# Patient Record
Sex: MALE | Race: WHITE | NOT HISPANIC OR LATINO | URBAN - METROPOLITAN AREA
[De-identification: names, ages, dates, MRNs, and addresses within clinical notes are randomized per-mention and may not be internally consistent; named-entity substitution may affect disease eponyms.]

---

## 2019-03-14 ENCOUNTER — INPATIENT (INPATIENT)
Facility: HOSPITAL | Age: 84
LOS: 14 days | Discharge: HOME HEALTH | End: 2019-03-29
Attending: HOSPITALIST | Admitting: HOSPITALIST
Payer: MEDICAID

## 2019-03-14 VITALS
SYSTOLIC BLOOD PRESSURE: 96 MMHG | DIASTOLIC BLOOD PRESSURE: 25 MMHG | RESPIRATION RATE: 18 BRPM | HEART RATE: 102 BPM | OXYGEN SATURATION: 97 % | TEMPERATURE: 99 F

## 2019-03-14 DIAGNOSIS — J96.90 RESPIRATORY FAILURE, UNSPECIFIED, UNSPECIFIED WHETHER WITH HYPOXIA OR HYPERCAPNIA: ICD-10-CM

## 2019-03-14 DIAGNOSIS — R09.89 OTHER SPECIFIED SYMPTOMS AND SIGNS INVOLVING THE CIRCULATORY AND RESPIRATORY SYSTEMS: ICD-10-CM

## 2019-03-14 LAB
ALBUMIN SERPL ELPH-MCNC: 1.8 G/DL — LOW (ref 3.3–5)
ALBUMIN SERPL ELPH-MCNC: 3 G/DL — LOW (ref 3.3–5)
ALP SERPL-CCNC: 49 U/L — SIGNIFICANT CHANGE UP (ref 40–120)
ALP SERPL-CCNC: 83 U/L — SIGNIFICANT CHANGE UP (ref 40–120)
ALT FLD-CCNC: 18 U/L — SIGNIFICANT CHANGE UP (ref 4–41)
ALT FLD-CCNC: 9 U/L — SIGNIFICANT CHANGE UP (ref 4–41)
ANION GAP SERPL CALC-SCNC: 17 MMO/L — HIGH (ref 7–14)
ANION GAP SERPL CALC-SCNC: 18 MMO/L — HIGH (ref 7–14)
ANION GAP SERPL CALC-SCNC: 19 MMO/L — HIGH (ref 7–14)
ANION GAP SERPL CALC-SCNC: 9 MMO/L — SIGNIFICANT CHANGE UP (ref 7–14)
APAP SERPL-MCNC: < 15 UG/ML — LOW (ref 15–25)
APPEARANCE UR: CLEAR — SIGNIFICANT CHANGE UP
APTT BLD: 37.1 SEC — HIGH (ref 27.5–36.3)
AST SERPL-CCNC: 12 U/L — SIGNIFICANT CHANGE UP (ref 4–40)
AST SERPL-CCNC: 15 U/L — SIGNIFICANT CHANGE UP (ref 4–40)
B PERT DNA SPEC QL NAA+PROBE: NOT DETECTED — SIGNIFICANT CHANGE UP
B-OH-BUTYR SERPL-SCNC: 0.8 MMOL/L — HIGH (ref 0–0.4)
B-OH-BUTYR SERPL-SCNC: 1.4 MMOL/L — HIGH (ref 0–0.4)
B-OH-BUTYR SERPL-SCNC: < 0 MMOL/L — LOW (ref 0–0.4)
BACTERIA # UR AUTO: NEGATIVE — SIGNIFICANT CHANGE UP
BASE EXCESS BLDA CALC-SCNC: -10.3 MMOL/L — SIGNIFICANT CHANGE UP
BASE EXCESS BLDA CALC-SCNC: -10.3 MMOL/L — SIGNIFICANT CHANGE UP
BASE EXCESS BLDV CALC-SCNC: -10.2 MMOL/L — SIGNIFICANT CHANGE UP
BASE EXCESS BLDV CALC-SCNC: -7.2 MMOL/L — SIGNIFICANT CHANGE UP
BASE EXCESS BLDV CALC-SCNC: -7.5 MMOL/L — SIGNIFICANT CHANGE UP
BASE EXCESS BLDV CALC-SCNC: -7.8 MMOL/L — SIGNIFICANT CHANGE UP
BASOPHILS # BLD AUTO: 0.01 K/UL — SIGNIFICANT CHANGE UP (ref 0–0.2)
BASOPHILS # BLD AUTO: 0.02 K/UL — SIGNIFICANT CHANGE UP (ref 0–0.2)
BASOPHILS NFR BLD AUTO: 0.1 % — SIGNIFICANT CHANGE UP (ref 0–2)
BASOPHILS NFR BLD AUTO: 0.2 % — SIGNIFICANT CHANGE UP (ref 0–2)
BASOPHILS NFR SPEC: 0 % — SIGNIFICANT CHANGE UP (ref 0–2)
BILIRUB SERPL-MCNC: 0.4 MG/DL — SIGNIFICANT CHANGE UP (ref 0.2–1.2)
BILIRUB SERPL-MCNC: 0.9 MG/DL — SIGNIFICANT CHANGE UP (ref 0.2–1.2)
BILIRUB UR-MCNC: NEGATIVE — SIGNIFICANT CHANGE UP
BLASTS # FLD: 0 % — SIGNIFICANT CHANGE UP (ref 0–0)
BLD GP AB SCN SERPL QL: NEGATIVE — SIGNIFICANT CHANGE UP
BLOOD GAS VENOUS - CREATININE: 1.26 MG/DL — SIGNIFICANT CHANGE UP (ref 0.5–1.3)
BLOOD GAS VENOUS - CREATININE: 1.44 MG/DL — HIGH (ref 0.5–1.3)
BLOOD GAS VENOUS - CREATININE: 1.73 MG/DL — HIGH (ref 0.5–1.3)
BLOOD GAS VENOUS - CREATININE: 1.96 MG/DL — HIGH (ref 0.5–1.3)
BLOOD UR QL VISUAL: SIGNIFICANT CHANGE UP
BUN SERPL-MCNC: 13 MG/DL — SIGNIFICANT CHANGE UP (ref 7–23)
BUN SERPL-MCNC: 21 MG/DL — SIGNIFICANT CHANGE UP (ref 7–23)
BUN SERPL-MCNC: 22 MG/DL — SIGNIFICANT CHANGE UP (ref 7–23)
BUN SERPL-MCNC: 23 MG/DL — SIGNIFICANT CHANGE UP (ref 7–23)
C PNEUM DNA SPEC QL NAA+PROBE: NOT DETECTED — SIGNIFICANT CHANGE UP
CALCIUM SERPL-MCNC: 5.1 MG/DL — CRITICAL LOW (ref 8.4–10.5)
CALCIUM SERPL-MCNC: 5.9 MG/DL — CRITICAL LOW (ref 8.4–10.5)
CALCIUM SERPL-MCNC: 8.2 MG/DL — LOW (ref 8.4–10.5)
CALCIUM SERPL-MCNC: 8.8 MG/DL — SIGNIFICANT CHANGE UP (ref 8.4–10.5)
CHLORIDE BLDA-SCNC: 110 MMOL/L — HIGH (ref 96–108)
CHLORIDE BLDA-SCNC: 115 MMOL/L — HIGH (ref 96–108)
CHLORIDE BLDV-SCNC: 110 MMOL/L — HIGH (ref 96–108)
CHLORIDE BLDV-SCNC: 112 MMOL/L — HIGH (ref 96–108)
CHLORIDE BLDV-SCNC: 113 MMOL/L — HIGH (ref 96–108)
CHLORIDE BLDV-SCNC: 116 MMOL/L — HIGH (ref 96–108)
CHLORIDE SERPL-SCNC: 104 MMOL/L — SIGNIFICANT CHANGE UP (ref 98–107)
CHLORIDE SERPL-SCNC: 107 MMOL/L — SIGNIFICANT CHANGE UP (ref 98–107)
CHLORIDE SERPL-SCNC: 118 MMOL/L — HIGH (ref 98–107)
CHLORIDE SERPL-SCNC: 122 MMOL/L — HIGH (ref 98–107)
CK MB BLD-MCNC: < 1 NG/ML — LOW (ref 1–6.6)
CK MB BLD-MCNC: SIGNIFICANT CHANGE UP (ref 0–2.5)
CK SERPL-CCNC: 28 U/L — LOW (ref 30–200)
CO2 SERPL-SCNC: 11 MMOL/L — LOW (ref 22–31)
CO2 SERPL-SCNC: 12 MMOL/L — LOW (ref 22–31)
CO2 SERPL-SCNC: 13 MMOL/L — LOW (ref 22–31)
CO2 SERPL-SCNC: 15 MMOL/L — LOW (ref 22–31)
COLOR SPEC: SIGNIFICANT CHANGE UP
CREAT SERPL-MCNC: 0.89 MG/DL — SIGNIFICANT CHANGE UP (ref 0.5–1.3)
CREAT SERPL-MCNC: 1.54 MG/DL — HIGH (ref 0.5–1.3)
CREAT SERPL-MCNC: 1.58 MG/DL — HIGH (ref 0.5–1.3)
CREAT SERPL-MCNC: 1.8 MG/DL — HIGH (ref 0.5–1.3)
EOSINOPHIL # BLD AUTO: 0.01 K/UL — SIGNIFICANT CHANGE UP (ref 0–0.5)
EOSINOPHIL # BLD AUTO: 0.01 K/UL — SIGNIFICANT CHANGE UP (ref 0–0.5)
EOSINOPHIL NFR BLD AUTO: 0.1 % — SIGNIFICANT CHANGE UP (ref 0–6)
EOSINOPHIL NFR BLD AUTO: 0.1 % — SIGNIFICANT CHANGE UP (ref 0–6)
EOSINOPHIL NFR FLD: 0 % — SIGNIFICANT CHANGE UP (ref 0–6)
ETHANOL BLD-MCNC: < 10 MG/DL — SIGNIFICANT CHANGE UP
FLUAV H1 2009 PAND RNA SPEC QL NAA+PROBE: NOT DETECTED — SIGNIFICANT CHANGE UP
FLUAV H1 RNA SPEC QL NAA+PROBE: NOT DETECTED — SIGNIFICANT CHANGE UP
FLUAV H3 RNA SPEC QL NAA+PROBE: NOT DETECTED — SIGNIFICANT CHANGE UP
FLUAV SUBTYP SPEC NAA+PROBE: NOT DETECTED — SIGNIFICANT CHANGE UP
FLUBV RNA SPEC QL NAA+PROBE: NOT DETECTED — SIGNIFICANT CHANGE UP
GAS PNL BLDV: 129 MMOL/L — LOW (ref 136–146)
GAS PNL BLDV: 130 MMOL/L — LOW (ref 136–146)
GAS PNL BLDV: 132 MMOL/L — LOW (ref 136–146)
GAS PNL BLDV: 134 MMOL/L — LOW (ref 136–146)
GLUCOSE BLDA-MCNC: 403 MG/DL — HIGH (ref 70–99)
GLUCOSE BLDA-MCNC: 507 MG/DL — CRITICAL HIGH (ref 70–99)
GLUCOSE BLDV-MCNC: 222 — HIGH (ref 70–99)
GLUCOSE BLDV-MCNC: 407 — HIGH (ref 70–99)
GLUCOSE BLDV-MCNC: 470 — CRITICAL HIGH (ref 70–99)
GLUCOSE BLDV-MCNC: 525 — CRITICAL HIGH (ref 70–99)
GLUCOSE SERPL-MCNC: 162 MG/DL — HIGH (ref 70–99)
GLUCOSE SERPL-MCNC: 327 MG/DL — HIGH (ref 70–99)
GLUCOSE SERPL-MCNC: 417 MG/DL — HIGH (ref 70–99)
GLUCOSE SERPL-MCNC: 514 MG/DL — CRITICAL HIGH (ref 70–99)
GLUCOSE UR-MCNC: >1000 — HIGH
HADV DNA SPEC QL NAA+PROBE: NOT DETECTED — SIGNIFICANT CHANGE UP
HCO3 BLDA-SCNC: 17 MMOL/L — LOW (ref 22–26)
HCO3 BLDA-SCNC: 18 MMOL/L — LOW (ref 22–26)
HCO3 BLDV-SCNC: 14 MMOL/L — LOW (ref 20–27)
HCO3 BLDV-SCNC: 16 MMOL/L — LOW (ref 20–27)
HCO3 BLDV-SCNC: 16 MMOL/L — LOW (ref 20–27)
HCO3 BLDV-SCNC: 19 MMOL/L — LOW (ref 20–27)
HCOV PNL SPEC NAA+PROBE: SIGNIFICANT CHANGE UP
HCT VFR BLD CALC: 34.6 % — LOW (ref 39–50)
HCT VFR BLD CALC: 37.5 % — LOW (ref 39–50)
HCT VFR BLD CALC: 47.9 % — SIGNIFICANT CHANGE UP (ref 39–50)
HCT VFR BLDA CALC: 48.7 % — SIGNIFICANT CHANGE UP (ref 39–51)
HCT VFR BLDA CALC: 48.9 % — SIGNIFICANT CHANGE UP (ref 39–51)
HCT VFR BLDV CALC: 45.8 % — SIGNIFICANT CHANGE UP (ref 39–51)
HCT VFR BLDV CALC: 46.9 % — SIGNIFICANT CHANGE UP (ref 39–51)
HCT VFR BLDV CALC: 47.2 % — SIGNIFICANT CHANGE UP (ref 39–51)
HCT VFR BLDV CALC: 47.3 % — SIGNIFICANT CHANGE UP (ref 39–51)
HGB BLD-MCNC: 11 G/DL — LOW (ref 13–17)
HGB BLD-MCNC: 11.6 G/DL — LOW (ref 13–17)
HGB BLD-MCNC: 16 G/DL — SIGNIFICANT CHANGE UP (ref 13–17)
HGB BLDA-MCNC: 15.9 G/DL — SIGNIFICANT CHANGE UP (ref 13–17)
HGB BLDA-MCNC: 16 G/DL — SIGNIFICANT CHANGE UP (ref 13–17)
HGB BLDV-MCNC: 14.9 G/DL — SIGNIFICANT CHANGE UP (ref 13–17)
HGB BLDV-MCNC: 15.3 G/DL — SIGNIFICANT CHANGE UP (ref 13–17)
HGB BLDV-MCNC: 15.4 G/DL — SIGNIFICANT CHANGE UP (ref 13–17)
HGB BLDV-MCNC: 15.5 G/DL — SIGNIFICANT CHANGE UP (ref 13–17)
HMPV RNA SPEC QL NAA+PROBE: NOT DETECTED — SIGNIFICANT CHANGE UP
HPIV1 RNA SPEC QL NAA+PROBE: NOT DETECTED — SIGNIFICANT CHANGE UP
HPIV2 RNA SPEC QL NAA+PROBE: NOT DETECTED — SIGNIFICANT CHANGE UP
HPIV3 RNA SPEC QL NAA+PROBE: NOT DETECTED — SIGNIFICANT CHANGE UP
HPIV4 RNA SPEC QL NAA+PROBE: NOT DETECTED — SIGNIFICANT CHANGE UP
HYALINE CASTS # UR AUTO: SIGNIFICANT CHANGE UP
IMM GRANULOCYTES NFR BLD AUTO: 0.5 % — SIGNIFICANT CHANGE UP (ref 0–1.5)
IMM GRANULOCYTES NFR BLD AUTO: 0.5 % — SIGNIFICANT CHANGE UP (ref 0–1.5)
INR BLD: 1.38 — HIGH (ref 0.88–1.17)
KETONES UR-MCNC: NEGATIVE — SIGNIFICANT CHANGE UP
LACTATE BLDA-SCNC: 4.4 MMOL/L — CRITICAL HIGH (ref 0.5–2)
LACTATE BLDA-SCNC: 4.6 MMOL/L — CRITICAL HIGH (ref 0.5–2)
LACTATE BLDV-MCNC: 4.7 MMOL/L — CRITICAL HIGH (ref 0.5–2)
LACTATE BLDV-MCNC: 6.6 MMOL/L — CRITICAL HIGH (ref 0.5–2)
LACTATE BLDV-MCNC: 6.9 MMOL/L — CRITICAL HIGH (ref 0.5–2)
LACTATE BLDV-MCNC: 7.3 MMOL/L — CRITICAL HIGH (ref 0.5–2)
LEUKOCYTE ESTERASE UR-ACNC: NEGATIVE — SIGNIFICANT CHANGE UP
LIDOCAIN IGE QN: 9.3 U/L — SIGNIFICANT CHANGE UP (ref 7–60)
LYMPHOCYTES # BLD AUTO: 0.82 K/UL — LOW (ref 1–3.3)
LYMPHOCYTES # BLD AUTO: 1.18 K/UL — SIGNIFICANT CHANGE UP (ref 1–3.3)
LYMPHOCYTES # BLD AUTO: 15.2 % — SIGNIFICANT CHANGE UP (ref 13–44)
LYMPHOCYTES # BLD AUTO: 8.4 % — LOW (ref 13–44)
LYMPHOCYTES NFR SPEC AUTO: 8.5 % — LOW (ref 13–44)
MAGNESIUM SERPL-MCNC: 1.2 MG/DL — LOW (ref 1.6–2.6)
MAGNESIUM SERPL-MCNC: 1.3 MG/DL — LOW (ref 1.6–2.6)
MAGNESIUM SERPL-MCNC: 1.8 MG/DL — SIGNIFICANT CHANGE UP (ref 1.6–2.6)
MCHC RBC-ENTMCNC: 30.1 PG — SIGNIFICANT CHANGE UP (ref 27–34)
MCHC RBC-ENTMCNC: 30.4 PG — SIGNIFICANT CHANGE UP (ref 27–34)
MCHC RBC-ENTMCNC: 30.7 PG — SIGNIFICANT CHANGE UP (ref 27–34)
MCHC RBC-ENTMCNC: 30.9 % — LOW (ref 32–36)
MCHC RBC-ENTMCNC: 31.8 % — LOW (ref 32–36)
MCHC RBC-ENTMCNC: 33.4 % — SIGNIFICANT CHANGE UP (ref 32–36)
MCV RBC AUTO: 91.8 FL — SIGNIFICANT CHANGE UP (ref 80–100)
MCV RBC AUTO: 94.8 FL — SIGNIFICANT CHANGE UP (ref 80–100)
MCV RBC AUTO: 98.4 FL — SIGNIFICANT CHANGE UP (ref 80–100)
METAMYELOCYTES # FLD: 0 % — SIGNIFICANT CHANGE UP (ref 0–1)
MICROCYTES BLD QL: SLIGHT — SIGNIFICANT CHANGE UP
MONOCYTES # BLD AUTO: 0.45 K/UL — SIGNIFICANT CHANGE UP (ref 0–0.9)
MONOCYTES # BLD AUTO: 0.48 K/UL — SIGNIFICANT CHANGE UP (ref 0–0.9)
MONOCYTES NFR BLD AUTO: 4.9 % — SIGNIFICANT CHANGE UP (ref 2–14)
MONOCYTES NFR BLD AUTO: 5.8 % — SIGNIFICANT CHANGE UP (ref 2–14)
MONOCYTES NFR BLD: 0.9 % — LOW (ref 2–9)
MYELOCYTES NFR BLD: 0 % — SIGNIFICANT CHANGE UP (ref 0–0)
NEUTROPHIL AB SER-ACNC: 82.9 % — HIGH (ref 43–77)
NEUTROPHILS # BLD AUTO: 6.06 K/UL — SIGNIFICANT CHANGE UP (ref 1.8–7.4)
NEUTROPHILS # BLD AUTO: 8.42 K/UL — HIGH (ref 1.8–7.4)
NEUTROPHILS NFR BLD AUTO: 78.3 % — HIGH (ref 43–77)
NEUTROPHILS NFR BLD AUTO: 85.9 % — HIGH (ref 43–77)
NEUTS BAND # BLD: 7.7 % — HIGH (ref 0–6)
NITRITE UR-MCNC: NEGATIVE — SIGNIFICANT CHANGE UP
NRBC # FLD: 0 K/UL — LOW (ref 25–125)
NT-PROBNP SERPL-SCNC: 438.2 PG/ML — SIGNIFICANT CHANGE UP
OTHER - HEMATOLOGY %: 0 — SIGNIFICANT CHANGE UP
PCO2 BLDA: 22 MMHG — LOW (ref 35–48)
PCO2 BLDA: 27 MMHG — LOW (ref 35–48)
PCO2 BLDV: 29 MMHG — LOW (ref 41–51)
PCO2 BLDV: 44 MMHG — SIGNIFICANT CHANGE UP (ref 41–51)
PCO2 BLDV: 52 MMHG — HIGH (ref 41–51)
PCO2 BLDV: 55 MMHG — HIGH (ref 41–51)
PH BLDA: 7.34 PH — LOW (ref 7.35–7.45)
PH BLDA: 7.4 PH — SIGNIFICANT CHANGE UP (ref 7.35–7.45)
PH BLDV: 7.13 PH — CRITICAL LOW (ref 7.32–7.43)
PH BLDV: 7.2 PH — CRITICAL LOW (ref 7.32–7.43)
PH BLDV: 7.25 PH — LOW (ref 7.32–7.43)
PH BLDV: 7.38 PH — SIGNIFICANT CHANGE UP (ref 7.32–7.43)
PH UR: 6 — SIGNIFICANT CHANGE UP (ref 5–8)
PHOSPHATE SERPL-MCNC: 0.8 MG/DL — CRITICAL LOW (ref 2.5–4.5)
PHOSPHATE SERPL-MCNC: 1.4 MG/DL — LOW (ref 2.5–4.5)
PLATELET # BLD AUTO: 119 K/UL — LOW (ref 150–400)
PLATELET # BLD AUTO: 79 K/UL — LOW (ref 150–400)
PLATELET # BLD AUTO: 95 K/UL — LOW (ref 150–400)
PLATELET COUNT - ESTIMATE: SIGNIFICANT CHANGE UP
PMV BLD: 10.1 FL — SIGNIFICANT CHANGE UP (ref 7–13)
PMV BLD: 10.3 FL — SIGNIFICANT CHANGE UP (ref 7–13)
PMV BLD: 9.8 FL — SIGNIFICANT CHANGE UP (ref 7–13)
PO2 BLDA: 83 MMHG — SIGNIFICANT CHANGE UP (ref 83–108)
PO2 BLDA: 97 MMHG — SIGNIFICANT CHANGE UP (ref 83–108)
PO2 BLDV: 17 MMHG — LOW (ref 35–40)
PO2 BLDV: 18 MMHG — LOW (ref 35–40)
PO2 BLDV: 45 MMHG — HIGH (ref 35–40)
PO2 BLDV: < 24 MMHG — LOW (ref 35–40)
POIKILOCYTOSIS BLD QL AUTO: SLIGHT — SIGNIFICANT CHANGE UP
POTASSIUM BLDA-SCNC: 4.5 MMOL/L — SIGNIFICANT CHANGE UP (ref 3.4–4.5)
POTASSIUM BLDA-SCNC: 4.9 MMOL/L — HIGH (ref 3.4–4.5)
POTASSIUM BLDV-SCNC: 3 MMOL/L — LOW (ref 3.4–4.5)
POTASSIUM BLDV-SCNC: 4 MMOL/L — SIGNIFICANT CHANGE UP (ref 3.4–4.5)
POTASSIUM BLDV-SCNC: 5 MMOL/L — HIGH (ref 3.4–4.5)
POTASSIUM BLDV-SCNC: 5.4 MMOL/L — HIGH (ref 3.4–4.5)
POTASSIUM SERPL-MCNC: 2.2 MMOL/L — CRITICAL LOW (ref 3.5–5.3)
POTASSIUM SERPL-MCNC: 3.1 MMOL/L — LOW (ref 3.5–5.3)
POTASSIUM SERPL-MCNC: 5.2 MMOL/L — SIGNIFICANT CHANGE UP (ref 3.5–5.3)
POTASSIUM SERPL-MCNC: 5.7 MMOL/L — HIGH (ref 3.5–5.3)
POTASSIUM SERPL-SCNC: 2.2 MMOL/L — CRITICAL LOW (ref 3.5–5.3)
POTASSIUM SERPL-SCNC: 3.1 MMOL/L — LOW (ref 3.5–5.3)
POTASSIUM SERPL-SCNC: 5.2 MMOL/L — SIGNIFICANT CHANGE UP (ref 3.5–5.3)
POTASSIUM SERPL-SCNC: 5.7 MMOL/L — HIGH (ref 3.5–5.3)
PROCALCITONIN SERPL-MCNC: 12.22 NG/ML — HIGH (ref 0.02–0.1)
PROMYELOCYTES # FLD: 0 % — SIGNIFICANT CHANGE UP (ref 0–0)
PROT SERPL-MCNC: 3.6 G/DL — LOW (ref 6–8.3)
PROT SERPL-MCNC: 5.8 G/DL — LOW (ref 6–8.3)
PROT UR-MCNC: 100 — HIGH
PROTHROM AB SERPL-ACNC: 15.5 SEC — HIGH (ref 9.8–13.1)
RBC # BLD: 3.65 M/UL — LOW (ref 4.2–5.8)
RBC # BLD: 3.81 M/UL — LOW (ref 4.2–5.8)
RBC # BLD: 5.22 M/UL — SIGNIFICANT CHANGE UP (ref 4.2–5.8)
RBC # FLD: 12.9 % — SIGNIFICANT CHANGE UP (ref 10.3–14.5)
RBC # FLD: 13.1 % — SIGNIFICANT CHANGE UP (ref 10.3–14.5)
RBC # FLD: 13.3 % — SIGNIFICANT CHANGE UP (ref 10.3–14.5)
RBC CASTS # UR COMP ASSIST: SIGNIFICANT CHANGE UP (ref 0–?)
RH IG SCN BLD-IMP: NEGATIVE — SIGNIFICANT CHANGE UP
RSV RNA SPEC QL NAA+PROBE: NOT DETECTED — SIGNIFICANT CHANGE UP
RV+EV RNA SPEC QL NAA+PROBE: NOT DETECTED — SIGNIFICANT CHANGE UP
SALICYLATES SERPL-MCNC: < 5 MG/DL — LOW (ref 15–30)
SAO2 % BLDA: 95.7 % — SIGNIFICANT CHANGE UP (ref 95–99)
SAO2 % BLDA: 98 % — SIGNIFICANT CHANGE UP (ref 95–99)
SAO2 % BLDV: 17.3 % — LOW (ref 60–85)
SAO2 % BLDV: 21 % — LOW (ref 60–85)
SAO2 % BLDV: 23.2 % — LOW (ref 60–85)
SAO2 % BLDV: 81.4 % — SIGNIFICANT CHANGE UP (ref 60–85)
SMUDGE CELLS # BLD: PRESENT — SIGNIFICANT CHANGE UP
SODIUM BLDA-SCNC: 127 MMOL/L — LOW (ref 136–146)
SODIUM BLDA-SCNC: 133 MMOL/L — LOW (ref 136–146)
SODIUM SERPL-SCNC: 136 MMOL/L — SIGNIFICANT CHANGE UP (ref 135–145)
SODIUM SERPL-SCNC: 138 MMOL/L — SIGNIFICANT CHANGE UP (ref 135–145)
SODIUM SERPL-SCNC: 144 MMOL/L — SIGNIFICANT CHANGE UP (ref 135–145)
SODIUM SERPL-SCNC: 147 MMOL/L — HIGH (ref 135–145)
SP GR SPEC: 1.02 — SIGNIFICANT CHANGE UP (ref 1–1.04)
SPHEROCYTES BLD QL SMEAR: SIGNIFICANT CHANGE UP
SQUAMOUS # UR AUTO: SIGNIFICANT CHANGE UP
TROPONIN T, HIGH SENSITIVITY: 37 NG/L — SIGNIFICANT CHANGE UP (ref ?–14)
TROPONIN T, HIGH SENSITIVITY: 73 NG/L — CRITICAL HIGH (ref ?–14)
TROPONIN T, HIGH SENSITIVITY: 93 NG/L — CRITICAL HIGH (ref ?–14)
TSH SERPL-MCNC: 2.09 UIU/ML — SIGNIFICANT CHANGE UP (ref 0.27–4.2)
UROBILINOGEN FLD QL: NORMAL — SIGNIFICANT CHANGE UP
VARIANT LYMPHS # BLD: 0 % — SIGNIFICANT CHANGE UP
WBC # BLD: 13.34 K/UL — HIGH (ref 3.8–10.5)
WBC # BLD: 7.75 K/UL — SIGNIFICANT CHANGE UP (ref 3.8–10.5)
WBC # BLD: 9.8 K/UL — SIGNIFICANT CHANGE UP (ref 3.8–10.5)
WBC # FLD AUTO: 13.34 K/UL — HIGH (ref 3.8–10.5)
WBC # FLD AUTO: 7.75 K/UL — SIGNIFICANT CHANGE UP (ref 3.8–10.5)
WBC # FLD AUTO: 9.8 K/UL — SIGNIFICANT CHANGE UP (ref 3.8–10.5)
WBC UR QL: SIGNIFICANT CHANGE UP (ref 0–?)

## 2019-03-14 PROCEDURE — 70450 CT HEAD/BRAIN W/O DYE: CPT | Mod: 26

## 2019-03-14 PROCEDURE — 72125 CT NECK SPINE W/O DYE: CPT | Mod: 26

## 2019-03-14 PROCEDURE — 99223 1ST HOSP IP/OBS HIGH 75: CPT | Mod: GC

## 2019-03-14 PROCEDURE — 99232 SBSQ HOSP IP/OBS MODERATE 35: CPT | Mod: GC

## 2019-03-14 PROCEDURE — 71260 CT THORAX DX C+: CPT | Mod: 26

## 2019-03-14 PROCEDURE — 73552 X-RAY EXAM OF FEMUR 2/>: CPT | Mod: 26,LT

## 2019-03-14 PROCEDURE — 74177 CT ABD & PELVIS W/CONTRAST: CPT | Mod: 26

## 2019-03-14 PROCEDURE — 73502 X-RAY EXAM HIP UNI 2-3 VIEWS: CPT | Mod: 26,LT

## 2019-03-14 PROCEDURE — 71045 X-RAY EXAM CHEST 1 VIEW: CPT | Mod: 26

## 2019-03-14 PROCEDURE — 99497 ADVNCD CARE PLAN 30 MIN: CPT | Mod: 25

## 2019-03-14 RX ORDER — DEXTROSE 50 % IN WATER 50 %
25 SYRINGE (ML) INTRAVENOUS ONCE
Qty: 0 | Refills: 0 | Status: DISCONTINUED | OUTPATIENT
Start: 2019-03-14 | End: 2019-03-29

## 2019-03-14 RX ORDER — AZITHROMYCIN 500 MG/1
TABLET, FILM COATED ORAL
Qty: 0 | Refills: 0 | Status: DISCONTINUED | OUTPATIENT
Start: 2019-03-14 | End: 2019-03-16

## 2019-03-14 RX ORDER — IPRATROPIUM/ALBUTEROL SULFATE 18-103MCG
3 AEROSOL WITH ADAPTER (GRAM) INHALATION EVERY 6 HOURS
Qty: 0 | Refills: 0 | Status: DISCONTINUED | OUTPATIENT
Start: 2019-03-14 | End: 2019-03-14

## 2019-03-14 RX ORDER — POTASSIUM CHLORIDE 20 MEQ
10 PACKET (EA) ORAL ONCE
Qty: 0 | Refills: 0 | Status: COMPLETED | OUTPATIENT
Start: 2019-03-14 | End: 2019-03-14

## 2019-03-14 RX ORDER — AZITHROMYCIN 500 MG/1
500 TABLET, FILM COATED ORAL ONCE
Qty: 0 | Refills: 0 | Status: COMPLETED | OUTPATIENT
Start: 2019-03-14 | End: 2019-03-14

## 2019-03-14 RX ORDER — SODIUM CHLORIDE 9 MG/ML
1000 INJECTION INTRAMUSCULAR; INTRAVENOUS; SUBCUTANEOUS ONCE
Qty: 0 | Refills: 0 | Status: COMPLETED | OUTPATIENT
Start: 2019-03-14 | End: 2019-03-14

## 2019-03-14 RX ORDER — INSULIN LISPRO 100/ML
VIAL (ML) SUBCUTANEOUS EVERY 6 HOURS
Qty: 0 | Refills: 0 | Status: DISCONTINUED | OUTPATIENT
Start: 2019-03-14 | End: 2019-03-15

## 2019-03-14 RX ORDER — CALCIUM ACETATE 667 MG
667 TABLET ORAL
Qty: 0 | Refills: 0 | Status: COMPLETED | OUTPATIENT
Start: 2019-03-14 | End: 2019-03-15

## 2019-03-14 RX ORDER — AZITHROMYCIN 500 MG/1
500 TABLET, FILM COATED ORAL EVERY 24 HOURS
Qty: 0 | Refills: 0 | Status: DISCONTINUED | OUTPATIENT
Start: 2019-03-15 | End: 2019-03-16

## 2019-03-14 RX ORDER — INSULIN LISPRO 100/ML
5 VIAL (ML) SUBCUTANEOUS ONCE
Qty: 0 | Refills: 0 | Status: DISCONTINUED | OUTPATIENT
Start: 2019-03-14 | End: 2019-03-14

## 2019-03-14 RX ORDER — SODIUM,POTASSIUM PHOSPHATES 278-250MG
1 POWDER IN PACKET (EA) ORAL
Qty: 0 | Refills: 0 | Status: COMPLETED | OUTPATIENT
Start: 2019-03-14 | End: 2019-03-15

## 2019-03-14 RX ORDER — MIDAZOLAM HYDROCHLORIDE 1 MG/ML
1 INJECTION, SOLUTION INTRAMUSCULAR; INTRAVENOUS ONCE
Qty: 0 | Refills: 0 | Status: DISCONTINUED | OUTPATIENT
Start: 2019-03-14 | End: 2019-03-14

## 2019-03-14 RX ORDER — GLUCAGON INJECTION, SOLUTION 0.5 MG/.1ML
1 INJECTION, SOLUTION SUBCUTANEOUS ONCE
Qty: 0 | Refills: 0 | Status: DISCONTINUED | OUTPATIENT
Start: 2019-03-14 | End: 2019-03-29

## 2019-03-14 RX ORDER — SODIUM CHLORIDE 9 MG/ML
1000 INJECTION INTRAMUSCULAR; INTRAVENOUS; SUBCUTANEOUS
Qty: 0 | Refills: 0 | Status: DISCONTINUED | OUTPATIENT
Start: 2019-03-14 | End: 2019-03-16

## 2019-03-14 RX ORDER — PIPERACILLIN AND TAZOBACTAM 4; .5 G/20ML; G/20ML
3.38 INJECTION, POWDER, LYOPHILIZED, FOR SOLUTION INTRAVENOUS EVERY 12 HOURS
Qty: 0 | Refills: 0 | Status: DISCONTINUED | OUTPATIENT
Start: 2019-03-15 | End: 2019-03-21

## 2019-03-14 RX ORDER — PIPERACILLIN AND TAZOBACTAM 4; .5 G/20ML; G/20ML
3.38 INJECTION, POWDER, LYOPHILIZED, FOR SOLUTION INTRAVENOUS ONCE
Qty: 0 | Refills: 0 | Status: COMPLETED | OUTPATIENT
Start: 2019-03-14 | End: 2019-03-14

## 2019-03-14 RX ORDER — VANCOMYCIN HCL 1 G
1000 VIAL (EA) INTRAVENOUS ONCE
Qty: 0 | Refills: 0 | Status: COMPLETED | OUTPATIENT
Start: 2019-03-14 | End: 2019-03-14

## 2019-03-14 RX ORDER — DEXTROSE 50 % IN WATER 50 %
12.5 SYRINGE (ML) INTRAVENOUS ONCE
Qty: 0 | Refills: 0 | Status: DISCONTINUED | OUTPATIENT
Start: 2019-03-14 | End: 2019-03-29

## 2019-03-14 RX ORDER — INSULIN HUMAN 100 [IU]/ML
7 INJECTION, SOLUTION SUBCUTANEOUS
Qty: 100 | Refills: 0 | Status: DISCONTINUED | OUTPATIENT
Start: 2019-03-14 | End: 2019-03-14

## 2019-03-14 RX ORDER — HEPARIN SODIUM 5000 [USP'U]/ML
5000 INJECTION INTRAVENOUS; SUBCUTANEOUS EVERY 8 HOURS
Qty: 0 | Refills: 0 | Status: DISCONTINUED | OUTPATIENT
Start: 2019-03-14 | End: 2019-03-18

## 2019-03-14 RX ORDER — IPRATROPIUM/ALBUTEROL SULFATE 18-103MCG
3 AEROSOL WITH ADAPTER (GRAM) INHALATION EVERY 4 HOURS
Qty: 0 | Refills: 0 | Status: DISCONTINUED | OUTPATIENT
Start: 2019-03-14 | End: 2019-03-17

## 2019-03-14 RX ORDER — POTASSIUM CHLORIDE 20 MEQ
40 PACKET (EA) ORAL ONCE
Qty: 0 | Refills: 0 | Status: DISCONTINUED | OUTPATIENT
Start: 2019-03-15 | End: 2019-03-15

## 2019-03-14 RX ORDER — ACETAMINOPHEN 500 MG
1000 TABLET ORAL ONCE
Qty: 0 | Refills: 0 | Status: COMPLETED | OUTPATIENT
Start: 2019-03-14 | End: 2019-03-14

## 2019-03-14 RX ORDER — INSULIN LISPRO 100/ML
5 VIAL (ML) SUBCUTANEOUS ONCE
Qty: 0 | Refills: 0 | Status: COMPLETED | OUTPATIENT
Start: 2019-03-14 | End: 2019-03-14

## 2019-03-14 RX ORDER — INSULIN GLARGINE 100 [IU]/ML
10 INJECTION, SOLUTION SUBCUTANEOUS ONCE
Qty: 0 | Refills: 0 | Status: COMPLETED | OUTPATIENT
Start: 2019-03-14 | End: 2019-03-14

## 2019-03-14 RX ORDER — CALCIUM GLUCONATE 100 MG/ML
1 VIAL (ML) INTRAVENOUS ONCE
Qty: 0 | Refills: 0 | Status: COMPLETED | OUTPATIENT
Start: 2019-03-14 | End: 2019-03-14

## 2019-03-14 RX ORDER — SODIUM CHLORIDE 9 MG/ML
1000 INJECTION, SOLUTION INTRAVENOUS
Qty: 0 | Refills: 0 | Status: DISCONTINUED | OUTPATIENT
Start: 2019-03-14 | End: 2019-03-29

## 2019-03-14 RX ORDER — MAGNESIUM SULFATE 500 MG/ML
2 VIAL (ML) INJECTION ONCE
Qty: 0 | Refills: 0 | Status: COMPLETED | OUTPATIENT
Start: 2019-03-14 | End: 2019-03-15

## 2019-03-14 RX ORDER — SODIUM CHLORIDE 9 MG/ML
1000 INJECTION, SOLUTION INTRAVENOUS ONCE
Qty: 0 | Refills: 0 | Status: COMPLETED | OUTPATIENT
Start: 2019-03-14 | End: 2019-03-14

## 2019-03-14 RX ORDER — INSULIN LISPRO 100/ML
7 VIAL (ML) SUBCUTANEOUS ONCE
Qty: 0 | Refills: 0 | Status: COMPLETED | OUTPATIENT
Start: 2019-03-14 | End: 2019-03-14

## 2019-03-14 RX ORDER — POTASSIUM PHOSPHATE, MONOBASIC POTASSIUM PHOSPHATE, DIBASIC 236; 224 MG/ML; MG/ML
30 INJECTION, SOLUTION INTRAVENOUS ONCE
Qty: 0 | Refills: 0 | Status: DISCONTINUED | OUTPATIENT
Start: 2019-03-14 | End: 2019-03-15

## 2019-03-14 RX ORDER — DEXTROSE 50 % IN WATER 50 %
15 SYRINGE (ML) INTRAVENOUS ONCE
Qty: 0 | Refills: 0 | Status: DISCONTINUED | OUTPATIENT
Start: 2019-03-14 | End: 2019-03-29

## 2019-03-14 RX ADMIN — PIPERACILLIN AND TAZOBACTAM 3.38 GRAM(S): 4; .5 INJECTION, POWDER, LYOPHILIZED, FOR SOLUTION INTRAVENOUS at 02:30

## 2019-03-14 RX ADMIN — MIDAZOLAM HYDROCHLORIDE 1 MILLIGRAM(S): 1 INJECTION, SOLUTION INTRAMUSCULAR; INTRAVENOUS at 03:30

## 2019-03-14 RX ADMIN — Medication 200 GRAM(S): at 04:31

## 2019-03-14 RX ADMIN — PIPERACILLIN AND TAZOBACTAM 200 GRAM(S): 4; .5 INJECTION, POWDER, LYOPHILIZED, FOR SOLUTION INTRAVENOUS at 11:47

## 2019-03-14 RX ADMIN — Medication 7 UNIT(S): at 11:44

## 2019-03-14 RX ADMIN — Medication 8: at 18:46

## 2019-03-14 RX ADMIN — Medication 100 MILLIEQUIVALENT(S): at 07:16

## 2019-03-14 RX ADMIN — Medication 100 MILLIEQUIVALENT(S): at 04:31

## 2019-03-14 RX ADMIN — SODIUM CHLORIDE 1000 MILLILITER(S): 9 INJECTION INTRAMUSCULAR; INTRAVENOUS; SUBCUTANEOUS at 11:44

## 2019-03-14 RX ADMIN — Medication 400 MILLIGRAM(S): at 13:16

## 2019-03-14 RX ADMIN — SODIUM CHLORIDE 1000 MILLILITER(S): 9 INJECTION INTRAMUSCULAR; INTRAVENOUS; SUBCUTANEOUS at 02:24

## 2019-03-14 RX ADMIN — SODIUM CHLORIDE 1000 MILLILITER(S): 9 INJECTION, SOLUTION INTRAVENOUS at 06:19

## 2019-03-14 RX ADMIN — SODIUM CHLORIDE 1000 MILLILITER(S): 9 INJECTION INTRAMUSCULAR; INTRAVENOUS; SUBCUTANEOUS at 04:31

## 2019-03-14 RX ADMIN — SODIUM CHLORIDE 100 MILLILITER(S): 9 INJECTION INTRAMUSCULAR; INTRAVENOUS; SUBCUTANEOUS at 22:16

## 2019-03-14 RX ADMIN — Medication 100 MILLIEQUIVALENT(S): at 05:38

## 2019-03-14 RX ADMIN — SODIUM CHLORIDE 1000 MILLILITER(S): 9 INJECTION INTRAMUSCULAR; INTRAVENOUS; SUBCUTANEOUS at 03:32

## 2019-03-14 RX ADMIN — Medication 5 UNIT(S): at 06:18

## 2019-03-14 RX ADMIN — Medication 1000 MILLIGRAM(S): at 13:30

## 2019-03-14 RX ADMIN — SODIUM CHLORIDE 100 MILLILITER(S): 9 INJECTION INTRAMUSCULAR; INTRAVENOUS; SUBCUTANEOUS at 13:18

## 2019-03-14 RX ADMIN — AZITHROMYCIN 250 MILLIGRAM(S): 500 TABLET, FILM COATED ORAL at 13:17

## 2019-03-14 RX ADMIN — Medication 250 MILLIGRAM(S): at 05:44

## 2019-03-14 RX ADMIN — INSULIN HUMAN 7 UNIT(S)/HR: 100 INJECTION, SOLUTION SUBCUTANEOUS at 06:03

## 2019-03-14 RX ADMIN — INSULIN GLARGINE 10 UNIT(S): 100 INJECTION, SOLUTION SUBCUTANEOUS at 12:22

## 2019-03-14 RX ADMIN — Medication 3 MILLILITER(S): at 13:32

## 2019-03-14 RX ADMIN — Medication 3 MILLILITER(S): at 20:23

## 2019-03-14 RX ADMIN — PIPERACILLIN AND TAZOBACTAM 200 GRAM(S): 4; .5 INJECTION, POWDER, LYOPHILIZED, FOR SOLUTION INTRAVENOUS at 02:00

## 2019-03-14 RX ADMIN — HEPARIN SODIUM 5000 UNIT(S): 5000 INJECTION INTRAVENOUS; SUBCUTANEOUS at 21:59

## 2019-03-14 NOTE — CONSULT NOTE ADULT - ASSESSMENT
88M with HLD, DM, hard of hearing presented with AMS and respiratory distress following an unwitnessed fall yesterday 88M with HLD, DM, hard of hearing presented with AMS and respiratory distress following an unwitnessed fall yesterday likely in the setting of multifocal pneumonia, a/w L femoral neck fracture. Mixed respiratory and AG metabolic acidosis now improved, at this point likely due elevated lactate from infection. 88M with HLD, DM, hard of hearing presented with AMS and respiratory distress following an unwitnessed fall yesterday likely in the setting of multifocal pneumonia, a/w L femoral neck fracture. Mixed respiratory and AG metabolic acidosis now improved with BIPAP, metabolic component likely due primarily to lactic acidosis.     # Hypoxic Respiratory Failure  - Continue vancomycin, zosyn, and azithromycin for multifocal pneumonia for now, can consider discontinuing vancomycin if family confirms no recent hospitalizations  - Can continue BIPAP for now and de-escalate in AM if tolerates  - A-line predominant with small IVC, would start LR at 100 cc/hr x10 hours and then reassess  - Follow up blood cultures, obtain sputum cultures  - Trend lactate    # Encephalopathy  - CT head negative  - Obtain further collateral from family    # Hyperglycemia  - Would start lantus 10u for now, ISS  - Unlikely DKA given lactic acidosis, pH 7.34 and HCO3 27     # L femoral neck fracture: follow up orthopedics    Pt does not require admission to ICU at present time. Please call with any questions.    Wilber Grady MD  MICU Resident, [id number] 88M with HLD, DM, hard of hearing presented with AMS and respiratory distress following an unwitnessed fall yesterday likely in the setting of multifocal pneumonia, a/w L femoral neck fracture. Mixed respiratory and AG metabolic acidosis now improved with BIPAP, metabolic component likely primarily lactic acidosis.     # Hypoxic Respiratory Failure  - Continue vancomycin, zosyn, and azithromycin for multifocal pneumonia for now, can consider discontinuing vancomycin if family confirms no recent hospitalizations  - Can continue BIPAP for now and de-escalate in AM if tolerates  - A-line predominant with small IVC, would start LR at 100 cc/hr x10 hours and then reassess  - Follow up blood cultures, obtain sputum cultures  - Trend lactate    # Encephalopathy  - CT head negative  - Obtain further collateral from family    # Hyperglycemia  - Would start lantus 10u for now, ISS  - Unlikely DKA given lactic acidosis, pH 7.34 and HCO3 27     # L femoral neck fracture:   - Pt will likely require orthopedic surgery once stable  - Follow up orthopedics    Pt does not require admission to ICU at present time. Please call with any questions.    Wilber Grady MD  MICU Resident, [id number]

## 2019-03-14 NOTE — PROGRESS NOTE ADULT - SUBJECTIVE AND OBJECTIVE BOX
Pt is a 89yo Bosnian-speaking M with PMHx of DM, hard of hearing presenting for presented with AMS and respiratory distress following an unwitnessed fall yesterday.  Pt not answering to questions, used telephone Bosnian  although awake and alert. Called Son who said last night the family heard a thud while the patient was in the bathroom. They found him on the floor and called 911. Of note, [place] moved [place] 1 [place] ago and lives with son.      In the ED:   Vital Signs

## 2019-03-14 NOTE — ED PROVIDER NOTE - PHYSICAL EXAMINATION
no LE edema, normal equal distal pulses.  scattered older appearing scabs b/l shins  LLE slightly shortened and externally rotated.  Pt not responsive to any stimuli but occasionally opening eyes spontaneously. no LE edema, normal equal distal pulses.  scattered older appearing scabs b/l shins  LLE slightly shortened and externally rotated.  Pt not responsive to any stimuli but occasionally opening eyes spontaneously.  mottled extremities.

## 2019-03-14 NOTE — CONSULT NOTE ADULT - ASSESSMENT
88M with HLD, DM, hard of hearing presented with AMS and respiratory distress following an unwitnessed fall on the day of presentation likely in the setting of multifocal pneumonia, a/w L femoral neck fracture.       # Hypoxic Respiratory Failure  -in the setting of mulifocal PNA  -continue BiPAP for now with attempt to transition back to NC in 12 hours  -continue antibiotic coverage for PNA with Vanc, Zosyn, Azithromycin  -Patient now DNR/DNI per son's wishes  -caution with fluids given impaired LV function noted on bedside sono    # Encephalopathy  -CT head negative  -Per family, pt has history of dementia; likely delirium superimposed on dementia     # L femoral neck fracture:   - Pt will likely require orthopedic surgery once stable  - Follow up orthopedics    Patient is not a MICU candidate at this time. Please reconsult PRN.

## 2019-03-14 NOTE — H&P ADULT - NSHPSOCIALHISTORY_GEN_ALL_CORE
Moved from [facility] ~1 year ago  Lives with son  No smoking hx, no alcohol or drug use Moved from [facility] ~1 year ago  Lives with son   with 13 adult children  No smoking hx, no alcohol or drug use

## 2019-03-14 NOTE — H&P ADULT - NSHPPHYSICALEXAM_GEN_ALL_CORE
PHYSICAL EXAM:  GENERAL: in severe distress  HEAD:  Atraumatic, Normocephalic  EYES: PERRLA   NECK: No JVD appreciated  CHEST/LUNG: B/L coarse rhonchi, +wheezing; BIPAP in place   HEART: tachycardic; S1, S2; No murmurs, rubs, or gallops  ABDOMEN: Soft, Nontender, Nondistended; Normoactive bowel sounds  EXTREMITIES:  2+ Peripheral Pulses, 1+ edema B/L in LE up to the knee  PSYCH: awake, alert   NEUROLOGY: moving extremities spontaneously   SKIN: No rashes or lesions PHYSICAL EXAM:  GENERAL: in severe distress  HEAD:  Atraumatic, Normocephalic  EYES: PERRLA   NECK: No JVD appreciated  CHEST/LUNG: B/L coarse rhonchi, +wheezing; BIPAP in place, belly breathing  HEART: tachycardic; S1, S2; No murmurs, rubs, or gallops  ABDOMEN: Soft, Nontender, Nondistended; Normoactive bowel sounds  EXTREMITIES:  2+ Peripheral Pulses, 1+ edema B/L in LE up to the knee  PSYCH: awake, alert   NEUROLOGY: moving extremities spontaneously, occ tremor  SKIN: No rashes or lesions, no ecchymosis on L hip area Vital Signs Last 24 Hrs  T(C): 36.3 (14 Mar 2019 18:03), Max: 37.6 (14 Mar 2019 09:47)  T(F): 97.3 (14 Mar 2019 18:03), Max: 99.7 (14 Mar 2019 09:47)  HR: 98 (14 Mar 2019 18:03) (78 - 111)  BP: 106/68 (14 Mar 2019 18:03) (74/44 - 158/81)  BP(mean): --  RR: 18 (14 Mar 2019 18:03) (18 - 30)  SpO2: 100% (14 Mar 2019 18:03) (94% - 100%)    PHYSICAL EXAM:  GENERAL: in severe distress  HEAD:  Atraumatic, Normocephalic  EYES: PERRLA   NECK: No JVD appreciated  CHEST/LUNG: B/L coarse rhonchi, +wheezing; BIPAP in place, belly breathing  HEART: tachycardic; S1, S2; No murmurs, rubs, or gallops  ABDOMEN: Soft, Nontender, Nondistended; Normoactive bowel sounds  EXTREMITIES:  2+ Peripheral Pulses, 1+ edema B/L in LE up to the knee  PSYCH: awake, alert   NEUROLOGY: moving extremities spontaneously, occ tremor  SKIN: No rashes or lesions, no ecchymosis on L hip area

## 2019-03-14 NOTE — ED ADULT NURSE NOTE - CHIEF COMPLAINT QUOTE
BIBEMS responsive to stimuli with sudden onset of SOB, lips pursed, on NRB. PMH DM/HLD. Patient [place], Bosnian speaking, family member to translate. Hypotensive. Arrives with 20g IV to L wrist inserted by EMS. Lung sound congested. Family members state that they cannot understand him and he cannot understand them. When they found him he looked like he was drooling and unable to control his secretion. GARRY San called for stroke evaluation. CN aware of patient    ADDENDUM: No CODE STROKE called by MD Vang, patient to go back to bed 20.

## 2019-03-14 NOTE — ED ADULT TRIAGE NOTE - CHIEF COMPLAINT QUOTE
BIBEMS responsive to stimuli with sudden onset of SOB, lips pursed, on NRB. PMH DM/HLD. Patient [place], Bosnian speaking, family member to translate. Hypotensive. Arrives with 20g IV to L wrist inserted by EMS. Lung sound congested. Family members state that they cannot understand him and he cannot understand them. When they found him he looked like he was drooling and unable to control his secretion. GARRY San called for stroke evaluation. CN aware of patient BIBEMS responsive to stimuli with sudden onset of SOB, lips pursed, on NRB. PMH DM/HLD. Patient [place], Bosnian speaking, family member to translate. Hypotensive. Arrives with 20g IV to L wrist inserted by EMS. Lung sound congested. Family members state that they cannot understand him and he cannot understand them. When they found him he looked like he was drooling and unable to control his secretion. GARRY San called for stroke evaluation. CN aware of patient    ADDENDUM: No CODE STROKE called by MD Vang, patient to go back to bed 20.

## 2019-03-14 NOTE — ED ADULT NURSE REASSESSMENT NOTE - NS ED NURSE REASSESS COMMENT FT1
MD notified finger stick glucose level no new orders at this time pt is alert but not oriented VSS tolerating BIPAP well will continue to monitor

## 2019-03-14 NOTE — PROGRESS NOTE ADULT - ASSESSMENT
Pt is a 89yo Bosnian-speaking M with PMHx of DM, hard of hearing presenting for presented with AMS and respiratory distress following an unwitnessed fall, found to have L femoral neck fracture, sepsis 2/2 multifocal PNA, and mixed met/resp acidosis.    #Acute Hypoxic and Hypercarbic Respiratory Failure   - mixed respiratory and metabolic acidosis with pH 7.13 now 7.34, lactate 7.3 improved to 4.4, BHB 0.8  - on BIPAP, mentation improving    #Sepsis  - multifocal PNA, lactic acidosis  - s/p NS x2L, LRx1; additional bolus NS N1L now  - blood cx pending  - UA, RVP negative     Hyperglycemia  - lantus given now  - no ketones, acidosis improving with IVF    #HAVEN  - inital Cr .86, now doubled  - likely prerenal  - making urine, strict I&Os given hypotension    # Confusion  - improving with BIPAP, 2/2 from hypercarbia  - CT H negative    #L femoral neck fracture  - ortho appreciate recs  - pending medical stabilization\  - tylenol for pain control     #prophylactic measure  - DVT - will start prophylaxis after recent CBC

## 2019-03-14 NOTE — ED PROVIDER NOTE - OBJECTIVE STATEMENT
88M Hx obtained from son. Pt is bosnian speaking, very [place]. 88M PMH DM, HLD p/w ams/resp distress. Pt usually fully functional and alert. Lives w/ family. Was usual self up until ~2200, went to bathroom and family hear thud. Found pt on floor and not responding to them. Pt lethargic and unable to provide any additional info.   meds: asa, lipitor, metformin. Nothing else.

## 2019-03-14 NOTE — ED ADULT NURSE NOTE - NSIMPLEMENTINTERV_GEN_ALL_ED
Implemented All Fall with Harm Risk Interventions:  [place] to call system. Call bell, personal items and telephone within reach. Instruct patient to call for assistance. Room bathroom lighting operational. Non-slip footwear when patient is off stretcher. Physically safe environment: no spills, clutter or unnecessary equipment. Stretcher in lowest position, wheels locked, appropriate side rails in place. Provide visual cue, wrist band, yellow gown, etc. Monitor gait and stability. Monitor for mental status changes and reorient to person, place, and time. Review medications for side effects contributing to fall risk. Reinforce activity limits and safety measures with patient and family. Provide visual clues: red socks.

## 2019-03-14 NOTE — ED ADULT NURSE REASSESSMENT NOTE - NS ED NURSE REASSESS COMMENT FT1
House staff 2 assessing patient at bedside. Pt tolerating bipap at this time. Will continue to monitor.

## 2019-03-14 NOTE — H&P ADULT - HISTORY OF PRESENT ILLNESS
Pt is a 87yo Bosnian-speaking M with PMHx of DM, hard of hearing presenting for presented with AMS and respiratory distress following an unwitnessed fall yesterday.  Pt not answering to questions, used telephone Bosnian  although awake and alert. Called Son who said last night the family heard a thud while the patient was in the bathroom. They found him on the floor and called 911. Of note, [place] moved [place] ago and lives with son.        In the ED:   Vital signs: temp 99; , BP 96/25, 100% on supplemental oxygen. RR 20s. Labs notable for mixed respiratory and metabolic acidosis with pH 7.13 lactate 7.3, BHB 0.8, glucose 400s-500s,  K of 2.2 on BMP but 5 on ABG. EKG unremarkable, trop 37 without CK elevation, CT c/a/p with possible multifocal pna, L femoral neck fx.   s/p calcium gluconate x1, 2LNS, 1L LR, vanc, zosyn 5humalog Pt is a 89yo Bosnian-speaking M with PMHx of DM, hard of hearing, DVT in the past, skin cancer (s/p excision) presenting for presented with AMS and respiratory distress following an unwitnessed fall yesterday.  Pt not answering to questions, used telephone Bosnian  although awake and alert. Called Son who said last night the family heard a thud while the patient was in the bathroom. They found him on the floor and called 911. Of note, [place] moved [place] ago and lives with son.        In the ED:   Vital signs: temp 99; , BP 96/25, 100% on supplemental oxygen. RR 20s. Labs notable for mixed respiratory and metabolic acidosis with pH 7.13 lactate 7.3, BHB 0.8, glucose 400s-500s,  K of 2.2 on BMP but 5 on ABG. EKG unremarkable, trop 37 without CK elevation, CT c/a/p with possible multifocal pna, L femoral neck fx.   s/p calcium gluconate x1, 2LNS, 1L LR, vanc, zosyn 5humalog Pt is a 87yo Bosnian-speaking M with PMHx of DM, hard of hearing, DVT in the past, skin cancer (s/p excision) presenting for presented with AMS and respiratory distress following an unwitnessed fall yesterday.  Pt not answering to questions, used telephone Bosnian  although awake and alert. Called Son who said last night the family heard a thud while the patient was in the bathroom. They found him on the floor and called 911. Of note, [place] moved [place] ago and lives with son.  Patient AAOx2-3 at baseline, walks around although recently becoming more dependent on chair. As per son, patient has not had fevers, cough, abd discomfort, vomiting, diarrhea.       In the ED:   Vital signs: temp 99; , BP 96/25, 100% on supplemental oxygen. RR 20s. Labs notable for mixed respiratory and metabolic acidosis with pH 7.13 lactate 7.3, BHB 0.8, glucose 400s-500s,  K of 2.2 on BMP but 5 on ABG. EKG unremarkable, trop 37 without CK elevation, CT c/a/p with possible multifocal pna, L femoral neck fx.   s/p calcium gluconate x1, 2LNS, 1L LR, vanc, zosyn 5humalog Pt is a 89yo Bosnian-speaking M with PMHx of DM, hard of hearing, DVT in the past, skin cancer (s/p excision) presenting for presented with AMS and respiratory distress following an unwitnessed fall yesterday.  Pt not answering to questions, used telephone Bosnian  although awake and alert. Called Son who said last night the family heard a thud while the patient was in the bathroom. They found him on the floor and called 911. Of note, [place] ago and lives with son.  Patient AAOx2-3 at baseline, walks around although recently becoming more dependent on chair. As per son, patient has not had fevers, cough, abd discomfort, vomiting, diarrhea.       In the ED:   Vital signs: temp 99; , BP 96/25, 100% on supplemental oxygen. RR 20s. Labs notable for mixed respiratory and metabolic acidosis with pH 7.13 lactate 7.3, BHB 0.8, glucose 400s-500s,  K of 2.2 on BMP but 5 on ABG. EKG unremarkable, trop 37 without CK elevation, CT c/a/p with possible multifocal pna, L femoral neck fx. s/p calcium gluconate x1, 2LNS, 1L LR, vanc, zosyn 5humalog. Patient was placed on BIPAP. Patient was seen and examined. Used Bosnian  however difficult for patient to hear / understand. Patient awake, makes eye contact, nonverbal except some groans. Pt is a 89yo Bosnian-speaking M with PMHx of DM, hard of hearing, DVT in the past, skin cancer (s/p excision) presenting with AMS and respiratory distress following an unwitnessed fall yesterday. Pt awake and alert, but not answering questions with telephone Bosnian , but was able to answer simple questions after son arrived at bedside. Per son, the pt had been in his usual state of health, when the family heard a thud yesterday while the patient was in the bathroom. They found him on the floor and called 911. Of note, [place] just moved to [place] ago and lives with son.  Patient AAOx2-3 at baseline, walks around, although recently becoming more dependent on chair. As per son, patient has not had any recent illnesses, fevers, cough, abd discomfort, vomiting, diarrhea.     In the ED:   Vital signs: temp 99; , BP 96/25, 97% on supplemental oxygen. RR 20s. Labs notable for mixed respiratory and metabolic acidosis with pH 7.13 lactate 7.3, BHB 0.8, glucose 400s-500s,  K of 2.2 on BMP but 5 on ABG. EKG unremarkable, trop 37 at 1AM, 93 at 11:30AM without CK elevation, CT c/a/p with possible multifocal pna, L femoral neck fx. S/p calcium gluconate x1, 2LNS, 1L LR, vanc, zosyn, 5U of humalog. Patient was placed on BIPAP with improvement in saturation though still in respiratory distress. Patient was seen and examined. Used Bosnian  however difficult for patient to hear / understand, pt was only groaning.  Patient awake, makes eye contact. After son arrived, was able to answer some simple questions.

## 2019-03-14 NOTE — ED PROVIDER NOTE - CRITICAL CARE PROVIDED
additional history taking/conducted a detailed discussion of DNR status/direct patient care (not related to procedure)/interpretation of diagnostic studies/consult w/ pt's family directly relating to pts condition/documentation

## 2019-03-14 NOTE — H&P ADULT - ASSESSMENT
Assessment and Plan:   Pt is a 89yo Bosnian-speaking M with PMHx of DM, hard of hearing presenting for presented with AMS and respiratory distress following an unwitnessed fall, found to have L femoral neck fracture, sepsis likely 2/2 multifocal PNA, and acute Hypoxic and hypercarbic respiratory failure.    #Acute Hypoxic and Hypercarbic Respiratory Failure   - respiratory distress, coarse rhonchi, CT showing multifocal PNA   - vanc by level given new HAVEN, zosyn q12 due to Cr Cl  - on BIPAP, mentation improving; reduced settings given hypocarbia on recent ABG     #Sepsis  - multifocal PNA, lactic acidosis  - mixed respiratory and metabolic acidosis with pH 7.13,elevated lactate to 7  - with ~3L+ fluid and BIPAP, pH improving to 7.4; lactate 4.6  - s/p NS x2L, LRx1; additional bolus NS N1L now  - blood cx pending  - UA, RVP negative     Hyperglycemia  - lantus given around 11 = 10u; will redose tomorrow AM  - high ISS, fingersticks q6 while NPO   - no ketones, acidosis improving with IVF    #Elevated troponin  - elevated on admission, increasing 37 -> 93  - no chest pain, likely in setting of demand ischemia  - trend     #HAVEN  - initial Cr .86, now doubled  - likely prerenal  - making urine, strict I&Os given hypotension  - urine lytes     # Confusion  - improving with BIPAP, 2/2 from hypercarbia  - CTH negative for acute pathology     #L femoral neck fracture  - ortho appreciate recs  - pending medical stabilization\  - tylenol for pain control     #prophylactic measure  - DVT - will start prophylaxis after recent CBC  - Advanced Directive: son at bedside, extensive conversation regarding current critical medical illness, son understood, DNR/DNI, MOLST in chart Assessment and Plan:   Pt is a 87yo Bosnian-speaking M with PMHx of DM, hard of hearing presenting for presented with AMS and respiratory distress following an unwitnessed fall, found to have L femoral neck fracture, sepsis likely 2/2 multifocal PNA, and acute Hypoxic and hypercarbic respiratory failure.    #Acute Hypoxic and Hypercarbic Respiratory Failure   - respiratory distress, coarse rhonchi, CT showing multifocal PNA   - vanc by level given new HAVEN, zosyn q12 due to Cr Cl  - on BIPAP, mentation improving; reduced settings given hypocarbia on recent ABG   - standing duonebs     #Sepsis  - multifocal PNA, lactic acidosis  - mixed respiratory and metabolic acidosis with pH 7.13,elevated lactate to 7  - with ~3L+ fluid and BIPAP, pH improving to 7.4; lactate 4.6  - s/p NS x2L, LRx1; additional bolus NS N1L now  - blood cx pending  - UA, RVP negative     Hyperglycemia  - lantus given around 11 = 10u; will redose tomorrow AM  - high ISS, fingersticks q6 while NPO   - no ketones, acidosis improving with IVF    #Elevated troponin  - elevated on admission, increasing 37 -> 93  - no chest pain, likely in setting of demand ischemia  - trend     #HAVEN  - initial Cr .86, now doubled  - likely prerenal  - making urine, strict I&Os given hypotension  - urine lytes     # Confusion  - improving with BIPAP, 2/2 from hypercarbia  - CTH negative for acute pathology     #L femoral neck fracture  - ortho appreciate recs  - pending medical stabilization\  - tylenol for pain control     #prophylactic measure  - DVT - will start prophylaxis after recent CBC  - Advanced Directive: son at bedside, extensive conversation regarding current critical medical illness, son understood, DNR/DNI, MOLST in chart Assessment and Plan:   Pt is a 87yo Bosnian-speaking M with PMHx of DM, hard of hearing presenting for presented with AMS and respiratory distress following an unwitnessed fall, found to have L femoral neck fracture, sepsis likely 2/2 multifocal PNA, and acute Hypoxic and hypercarbic respiratory failure.    #Acute Hypoxic and Hypercarbic Respiratory Failure   - respiratory distress, coarse rhonchi, CT showing multifocal PNA   - vanc by level given new HAVEN, zosyn q12 due to Cr Cl  - on BIPAP, mentation improving; reduced settings given hypocarbia on recent ABG   - standing duonebs     #Sepsis  - multifocal PNA, lactic acidosis  - mixed respiratory and metabolic acidosis with pH 7.13,elevated lactate to 7  - with ~3L+ fluid and BIPAP, pH improving to 7.4; lactate 4.6  - s/p NS x2L, LRx1; additional bolus NS N1L now  - blood cx pending  - UA, RVP negative     #Hyperglycemia  - lantus given around 11 = 10u; will redose tomorrow AM  - high ISS, fingersticks q6 while NPO   - no ketones, acidosis improving with IVF    #Elevated troponin  - elevated on admission, increasing 37 at 2AM -> 93 at 11:30AM  - no chest pain, likely in setting of demand ischemia  - trend     #HAVEN  - initial Cr .86, now doubled  - likely prerenal  - making urine, strict I&Os given hypotension  - urine lytes     # Confusion  - improving with BIPAP, 2/2 from hypercarbia  - CTH negative for acute pathology     #L femoral neck fracture  - ortho appreciate recs  - pending medical stabilization  - tylenol for pain control     #prophylactic measure  - DVT - will start prophylaxis after recent CBC  - Advanced Directive: son at bedside, extensive conversation regarding current critical medical illness, son understood, DNR/DNI, MOLST in chart

## 2019-03-14 NOTE — ED PROVIDER NOTE - PROGRESS NOTE DETAILS
[place]: BP now high 90s/60s. Much more alert and interactive (w/ son interpreting). Tachypnea and WOB improved on bipap. will reassess. [place]: multiple lab abnormalities. lactate minimally improving. ph improving. BP improving. Cliniaclly, pt appears much more comfortable. likely multifocal pna (I d/w radiology). micu consulted, ortho consulted. [place]: final CT read showing possible pulmonary contusions. Clinically, this is more likely multifocal pna. pt w/ no significant trauma. Likely isolated femur fx for which pt does not require transfer to trauma center at this time. Pt clinically improving. still w/ lab abnormalities. Rediscussed w/ ICU. Not candidate at this time. I d/w hospitalist and text paged and d/w MAR.

## 2019-03-14 NOTE — ED ADULT NURSE REASSESSMENT NOTE - NS ED NURSE REASSESS COMMENT FT1
House staff team 2 made aware of patient's blood pressure. Pt AxOx0. Pt NSR on the monitor. Pt tolerating bipap at this time. Awaiting orders. Will continue to monitor.

## 2019-03-14 NOTE — CONSULT NOTE ADULT - ATTENDING COMMENTS
I agree with the above note and have personally seen and examined this patient. All pertinent films have been reviewed. Please refer to clinical documentation of the history, physical examinations, data summary, and both assessment and plan as documented above and with which I agree.    In brief, 88M s/p fall dx with L displaced fnfx.   XR pelvis/L hip/Lfemur demonstrate a displaced L fnfx    Abdiel Shabazz MD  Attending Orthopedic Surgeon I agree with the above note and have personally seen and examined this patient. All pertinent films have been reviewed. Please refer to clinical documentation of the history, physical examinations, data summary, and both assessment and plan as documented above and with which I agree.    In brief, 88M s/p fall dx with L displaced fnfx.   XR pelvis/L hip/Lfemur demonstrate a displaced L fnfx    Patient has dementia at baseline with superimposed delerium.   Discussion with patient son who is HCP.   However at the moment he is hospitalized with pneumonia and has respiratory support.  Has hx DVT, no PE unprovoked 5 years ago.    Not coopoerative with exam. Does not attend. AOx0  Skin intact  TTP about hip, +log roll, +heel strike  Not cooperative with motor/sensory examination 2/2 dementia  2+ dp pulse    The patient is an appropriate candidate for consideration of left hip hemiarthroplasty. This recommendation is based on the patients current diagnosis of L hip displaced fnfx. An extensive discussion was conducted of the natural history of this particular problem and the variety of surgical and non-surgical treatment options available to the patient. We discussed nonoperative treatment with pain control and nwb versus surgical intervention for girdlestone resection arthroplasty versus hemiarthroplasty versus total hip arthroplasty. A risk/benefit analysis was discussed with the HCP reviewing the advantages and disadvantages of surgical intervention at this time. A full explanation was given of the nature and the purpose of the procedure and anesthesia, its benefits, possible alternative methods of diagnosis or treatment, the risks involved, the possibility of complications, the foreseeable consequences of the procedure and the possible results of the non-treatment.    No guarantee or assurance was made as to the results that may be obtained. Specifically, the risks were identified to include, but are not limited to the following: Infection, phlebitis, pulmonary embolism, death, paralysis, dislocation, pain, stiffness, instability, limp, weakness, breakage, leg-length inequality, uncontrolled bleeding, nerve injury, blood vessel injury, pressure sores, anesthetic risks, delayed healing of wound and bone, and wear and loosening. The hcp understands that the patient is at increased risk of infection given his current infection as well as poor wound healing capacity given his dementia and possible decreased po intake with poor nutrition.  Further discussion was undertaken with the HCP about the details of surgical preparation, treatment, and postoperative rehabilitation including medical clearance, the hospital course, and the postoperative rehabilitation involved. The HCP understands that a hemiarthroplasty is not a total hip arthroplasty and that the acetabulum is not resurfaced in this case and that the acetrabulum can wear out causing recurring pain possibly requiring conversion to total hip arthroplasty. The HCP understands that if the patients wounds fail to heal then they may require further plastic surgery intervention procedures which may include a muscle flap and/or skin graft. All in all, I feel that this patient is a good candidate for surgical intervention.     Surgery tentatively scheduled for Monday, 3/18 in anticipation of resolution of the pneumonia and medical clearance.    Abdiel Shabazz MD  Attending Orthopedic Surgeon

## 2019-03-14 NOTE — H&P ADULT - NSICDXPASTMEDICALHX_GEN_ALL_CORE_FT
PAST MEDICAL HISTORY:  Cancer of skin     Deep vein thrombosis (DVT) R leg s/p anticoagulation    Dementia     [place] (hard of hearing)     Hyperlipidemia

## 2019-03-14 NOTE — H&P ADULT - NSHPLABSRESULTS_GEN_ALL_CORE
16.0                  Neurophils% (auto):   x      (03-14 @ 04:53):    13.34)-----------(119          Lymphocytes% (auto):  x                                             47.9                   Eosinphils% (auto):   x        Manual%: Neutrophils x    ; Lymphocytes x    ; Eosinophils x    ; Bands%: x    ; Blasts x          03-14    138  |  104  |  23  ----------------------------<  417<H>  5.7<H>   |  15<L>  |  1.80<H>    Ca    8.2<L>      14 Mar 2019 11:37  Phos  1.4     03-14  Mg     1.8     03-14    TPro  5.8<L>  /  Alb  3.0<L>  /  TBili  0.9  /  DBili  x   /  AST  15  /  ALT  18  /  AlkPhos  83  03-14    ( 03-14 @ 01:45 )   PT: 15.5 SEC;   INR: 1.38   aPTT: 37.1 SEC    ABG - ( 14 Mar 2019 13:15 )  pH: 7.40  /  pCO2: 22    /  pO2: 97    / HCO3: 18    / Base Excess: -10.3 /  SaO2: 98.0  / Lactate: 4.6      VBG - ( 14 Mar 2019 11:37 )  pH: 7.25  /  pCO2: 44    /  pO2: 18    / HCO3: 16    / Base Excess: -7.8  /  SvO2: 21.0  / Lactate: 6.9 16.0                  Neurophils% (auto):   x      (03-14 @ 04:53):    13.34)-----------(119          Lymphocytes% (auto):  x                                             47.9                   Eosinphils% (auto):   x        Manual%: Neutrophils x    ; Lymphocytes x    ; Eosinophils x    ; Bands%: x    ; Blasts x          03-14    138  |  104  |  23  ----------------------------<  417<H>  5.7<H>   |  15<L>  |  1.80<H>    Ca    8.2<L>      14 Mar 2019 11:37  Phos  1.4     03-14  Mg     1.8     03-14    TPro  5.8<L>  /  Alb  3.0<L>  /  TBili  0.9  /  DBili  x   /  AST  15  /  ALT  18  /  AlkPhos  83  03-14    ( 03-14 @ 01:45 )   PT: 15.5 SEC;   INR: 1.38   aPTT: 37.1 SEC    ABG - ( 14 Mar 2019 13:15 )  pH: 7.40  /  pCO2: 22    /  pO2: 97    / HCO3: 18    / Base Excess: -10.3 /  SaO2: 98.0  / Lactate: 4.6      VBG - ( 14 Mar 2019 11:37 )  pH: 7.25  /  pCO2: 44    /  pO2: 18    / HCO3: 16    / Base Excess: -7.8  /  SvO2: 21.0  / Lactate: 6.9    Bedside Echo with MICU Fellow revealed mild pericardial effusion, no RV strain, LV systolic dysfunction

## 2019-03-14 NOTE — CONSULT NOTE ADULT - ATTENDING COMMENTS
88M with HLD, DM, hard of hearing presented with AMS and respiratory distress following an unwitnessed fall yesterday likely in the setting of multifocal pneumonia, a/w L femoral neck fracture. Mixed respiratory and AG metabolic acidosis now improved with BIPAP, metabolic component likely primarily lactic acidosis.  IV fluids  RISS, lantus  bipap support  abx

## 2019-03-14 NOTE — CONSULT NOTE ADULT - SUBJECTIVE AND OBJECTIVE BOX
CHIEF COMPLAINT:    HPI:  88 y.o. [place] speaking gentleman with HLD, DM, dementia, hard of hearing, who presented with AMS and respiratory distress following an unwitnessed fall on the day prior to presentation. Pt was awake and tracking, AAOx1 in [place] (native speaking resident at bedside and, still confused. Per chart review, son at bedside reported that pt is functional and alert at baseline, and was his usual self until 10 pm when family heard a thud and found pt down in the bathroom. Pt was initially lethargic in the ED but was placed on BIPAP with improvement in his respiratory status and alacrity, though he remains confused.      In the ED, pt was tachypneic initially to the high 20s-30s and hypoxic, now comfortable on 50% 10/5. He was afebrile, tachycardic to the low 100s, had several soft BP readings in the 90s systolic and one in the 70s systolic but BP subsequently stabilized to 110s-120s systolic. Labs notable for mixed respiratory and metabolic acidosis, lactate 7.3 improved to 4.4, BHB 0.8, glucose 400s-500s, inconsistent electrolytes with K of 2.2 on BMP but 5 on ABG. EKG unremarkable, trop 37 without CK elevation, CT c/a/p with possible multifocal pna, and L femoral neck fx.     MICU was consulted for worsening work of breathing requiring BiPAP.     PAST MEDICAL & SURGICAL HISTORY:  Deep vein thrombosis (DVT): R leg s/p anticoagulation  [place] (hard of hearing)  Dementia  Hyperlipidemia  Cancer of skin      FAMILY HISTORY:  No pertinent family history in first degree relatives      No Known Allergies    Intolerances        HOME MEDICATIONS:    REVIEW OF SYSTEMS:  [X] Unable to assess ROS due to altered mental status.     OBJECTIVE:  ICU Vital Signs Last 24 Hrs  T(C): 36.7 (14 Mar 2019 15:26), Max: 37.6 (14 Mar 2019 09:47)  T(F): 98 (14 Mar 2019 15:26), Max: 99.7 (14 Mar 2019 09:47)  HR: 102 (14 Mar 2019 15:26) (78 - 111)  BP: 107/7 (14 Mar 2019 15:26) (74/44 - 158/81)  BP(mean): --  ABP: --  ABP(mean): --  RR: 23 (14 Mar 2019 15:26) (18 - 30)  SpO2: 100% (14 Mar 2019 15:26) (94% - 100%)        CAPILLARY BLOOD GLUCOSE      POCT Blood Glucose.: 346 mg/dL (14 Mar 2019 13:51)      PHYSICAL EXAM:  General: Frail elderly male  Neurology: nonfocal, NEWTON x 4  Eyes: PERRLA/ EOMI, Gross vision intact  ENT/Neck: Neck supple, trachea midline, No JVD, Gross hearing intact. BiPAP mask in place.   Respiratory: Diffuse rhonchi  CV: rapid rate, regular rhythm, S1S2, no murmurs, rubs or gallops  Abdominal: Soft, NT, ND +BS,   Extremities: No edema, + peripheral pulses  Skin: No Rashes, Hematoma, Ecchymosis  I    HOSPITAL MEDICATIONS:  MEDICATIONS  (STANDING):  ALBUTerol/ipratropium for Nebulization 3 milliLiter(s) Nebulizer every 4 hours  azithromycin  IVPB      dextrose 5%. 1000 milliLiter(s) (50 mL/Hr) IV Continuous <Continuous>  dextrose 50% Injectable 12.5 Gram(s) IV Push once  dextrose 50% Injectable 25 Gram(s) IV Push once  dextrose 50% Injectable 25 Gram(s) IV Push once  insulin lispro (HumaLOG) corrective regimen sliding scale   SubCutaneous every 6 hours  piperacillin/tazobactam IVPB. 3.375 Gram(s) IV Intermittent every 12 hours  sodium chloride 0.9%. 1000 milliLiter(s) (100 mL/Hr) IV Continuous <Continuous>    MEDICATIONS  (PRN):  dextrose 40% Gel 15 Gram(s) Oral once PRN Blood Glucose LESS THAN 70 milliGRAM(s)/deciliter  glucagon  Injectable 1 milliGRAM(s) IntraMuscular once PRN Glucose LESS THAN 70 milligrams/deciliter      LABS:                        16.0   13.34 )-----------( 119      ( 14 Mar 2019 04:53 )             47.9     03-14    138  |  104  |  23  ----------------------------<  417<H>  5.7<H>   |  15<L>  |  1.80<H>    Ca    8.2<L>      14 Mar 2019 11:37  Phos  1.4     03-14  Mg     1.8     03-14    TPro  5.8<L>  /  Alb  3.0<L>  /  TBili  0.9  /  DBili  x   /  AST  15  /  ALT  18  /  AlkPhos  83  03-14    PT/INR - ( 14 Mar 2019 01:45 )   PT: 15.5 SEC;   INR: 1.38          PTT - ( 14 Mar 2019 01:45 )  PTT:37.1 SEC  Urinalysis Basic - ( 14 Mar 2019 02:00 )    Color: LIGHT YELLOW / Appearance: CLEAR / S.018 / pH: 6.0  Gluc: >1000 / Ketone: NEGATIVE  / Bili: NEGATIVE / Urobili: NORMAL   Blood: TRACE / Protein: 100 / Nitrite: NEGATIVE   Leuk Esterase: NEGATIVE / RBC: 3-5 / WBC 0-2   Sq Epi: FEW / Non Sq Epi: x / Bacteria: NEGATIVE      Arterial Blood Gas:   @ 13:15  7.40/22/97/18/98.0/-10.3  ABG lactate: 4.6  Arterial Blood Gas:   @ 05:16  7.34/27/83/17/95.7/-10.3  ABG lactate: 4.4    Venous Blood Gas:   @ 11:37  7.25/44/18/16/21.0  VBG Lactate: 6.9  Venous Blood Gas:   @ 04:00  7.20/52/17/16/17.3  VBG Lactate: 6.6  Venous Blood Gas:   @ 02:30  7.13/55/< 24/14/23.2  VBG Lactate: 7.3      MICROBIOLOGY:     RADIOLOGY:  [ ] Reviewed and interpreted by me

## 2019-03-14 NOTE — CONSULT NOTE ADULT - SUBJECTIVE AND OBJECTIVE BOX
88y Male presents to [facility] ED s/p unwitnessed fall c/o severe hip pain. Patient was noted to be in his normal state of health until this evening when the patient was found down after an unwitnessed fall. He was found to be altered and lethargic. Localizes pain to L hip/femur. Patient denies radiation of pain. Patient denies numbness/tingling/burning in the LLE. No other bone/joint complaints. Patient is a community ambulator at baseline with/without assistive devices. Patient has no issues w/ ADLs/IADLs.     PAST MEDICAL & SURGICAL HISTORY:    MEDICATIONS  (STANDING):  potassium chloride  10 mEq/100 mL IVPB 10 milliEquivalent(s) IV Intermittent once  potassium chloride  10 mEq/100 mL IVPB 10 milliEquivalent(s) IV Intermittent once  vancomycin  IVPB 1000 milliGRAM(s) IV Intermittent once    MEDICATIONS  (PRN):    Allergies    No Known Allergies    Intolerances        T(C): 35.8 (03-14-19 @ 01:40), Max: 37.2 (03-14-19 @ 00:11)  HR: 111 (03-14-19 @ 03:33) (100 - 111)  BP: 129/72 (03-14-19 @ 03:33) (74/44 - 129/72)  RR: 24 (03-14-19 @ 03:33) (18 - 30)  SpO2: 100% (03-14-19 @ 03:33) (94% - 100%)  Wt(kg): --    PE   Gen: Patient lethargic, uncooperative w/ exam  LLE:  Skin intact; No ecchymosis/soft tissue swelling  Compartments soft; + TTP about hip. No TTP to knee/leg/ankle/foot   ROM lmited 2/2 pain  Grossly motor intact  DP/PT pulses 2+    RLE/BUE:   No bony TTP; Good ROM w/o pain; Exam Unremarkable    Imaging:  XR demonstrating L femoral neck  fracture    CT CAP L hip: Demonstrates L femoral neck fracture and multilobar pneumonia     88y Male with L femoral neck fracture after unwitnessed fall in setting of multilobar pneumonia with altered mental status.   - Pain control  - CBC/BMP/Coags/UA/T+S x2  - EKG/CXR  - Medical clearance  - Plan for operative intervention once patient is medically stable

## 2019-03-14 NOTE — ED ADULT NURSE NOTE - OBJECTIVE STATEMENT
Float RN: Patient received in room #20 primarily [place]nian speaking; family at bedside for translation. Per family, Patient c/o respirator distress amd was found on floor not responding. Patient baseline functional and alert, bosnian speaking and [place]. Respiratory at bedside; ED MD at bedside; Patient placed on BIPAP. 20G IV Placed in right hand, 20G IV placed in left wrist, 18G US IV placed by MD to right ac, labs drawn and sent. Sinus tach on CM. skin intact. VS as noted. Patient covered with multiple warm blankets. Primary RN Lydia at bedside; Will continue to monitor.

## 2019-03-14 NOTE — ED PROVIDER NOTE - ATTENDING CONTRIBUTION TO CARE
88M PMH DM, HLD p/w ams/resp distress. Pt usually fully functional and alert. Lives w/ family. Was usual self up until ~2200, went to bathroom and family hear thud. Found pt on floor and not responding to them. Pt lethargic and unable to provide any additional info. Tachycardic, hypotensive, satting high 90s. Exam as above. Severe respiratory distress.  ddx: Unclear if AMS/resp distress caused trauma or subsequent to. Possible seizure (post ictal, aspiration) vs. intracranial injury vs. infectious vs. cardiac.   Pt not responsive and increased WOB. FSG wnl.   IV access obtained. Empiric abx and IVF. Labs. EKG.   Though pt has minimal mental status, will trial bipap in hope to avoid intubating hypotensive pt.   Eventual further trauma imaging once hemodynamically stable.  Though pt has B-lines, heart itself looks ok and has no hx of cardiac issues. Will trial IVF.

## 2019-03-14 NOTE — CONSULT NOTE ADULT - SUBJECTIVE AND OBJECTIVE BOX
CHIEF COMPLAINT: Unwitnessed fall, hypoxic respiratory failure     HPI:    PAST MEDICAL & SURGICAL HISTORY:      FAMILY HISTORY:      SOCIAL HISTORY:  Smoking: unknwon  Substance Use: unknwon  EtOH Use: unknwon  Per EMR fully functional at baseline     Allergies    No Known Allergies    Intolerances        HOME MEDICATIONS:    REVIEW OF SYSTEMS:  [ x ] Unable to assess ROS because currently nonverbal, even in native [place] (native speaking resident Dr. Patel at bedside)    OBJECTIVE:  ICU Vital Signs Last 24 Hrs  T(C): 35.8 (14 Mar 2019 01:40), Max: 37.2 (14 Mar 2019 00:11)  T(F): 96.4 (14 Mar 2019 01:40), Max: 99 (14 Mar 2019 00:11)  HR: 99 (14 Mar 2019 03:48) (99 - 111)  BP: 129/72 (14 Mar 2019 03:33) (74/44 - 129/72)  BP(mean): --  ABP: --  ABP(mean): --  RR: 24 (14 Mar 2019 03:33) (18 - 30)  SpO2: 100% (14 Mar 2019 03:48) (94% - 100%)        CAPILLARY BLOOD GLUCOSE      POCT Blood Glucose.: 372 mg/dL (14 Mar 2019 00:57)      PHYSICAL EXAM:  General: confused, NAD on BIPAP breathing in low 20s  HEENT: PERRL  Lymph Nodes: no cervical lymphadenopathy  Neck: full ROM, no JVD  Respiratory: diffuse rhonchi bilaterally   Cardiovascular: tachycardic, regular rhythm, no murmurs  Abdomen: +BS, soft, NTND  Extremities: no clubbing, cyanosis, or edema  Skin: no visible bruises or rash  Neurological: nonverbal, tracking but not following commands in native language, moving all extremities  Psychiatry: confused, mildly agitated trying to take off bipap    LINES:     HOSPITAL MEDICATIONS:    vancomycin  IVPB 1000 milliGRAM(s) IV Intermittent once                  potassium chloride  10 mEq/100 mL IVPB 10 milliEquivalent(s) IV Intermittent once  potassium chloride  10 mEq/100 mL IVPB 10 milliEquivalent(s) IV Intermittent once            LABS:                        11.6   9.80  )-----------( 95       ( 14 Mar 2019 01:45 )             37.5     Hgb Trend: 11.6<--  03-14    147<H>  |  118<H>  |  13  ----------------------------<  327<H>  2.2<LL>   |  12<L>  |  0.89    Ca    5.1<LL>      14 Mar 2019 01:45    TPro  3.6<L>  /  Alb  1.8<L>  /  TBili  0.4  /  DBili  x   /  AST  12  /  ALT  9   /  AlkPhos  49  03-14    Creatinine Trend: 0.89<--  PT/INR - ( 14 Mar 2019 01:45 )   PT: 15.5 SEC;   INR: 1.38          PTT - ( 14 Mar 2019 01:45 )  PTT:37.1 SEC  Urinalysis Basic - ( 14 Mar 2019 02:00 )    Color: LIGHT YELLOW / Appearance: CLEAR / S.018 / pH: 6.0  Gluc: >1000 / Ketone: NEGATIVE  / Bili: NEGATIVE / Urobili: NORMAL   Blood: TRACE / Protein: 100 / Nitrite: NEGATIVE   Leuk Esterase: NEGATIVE / RBC: 3-5 / WBC 0-2   Sq Epi: FEW / Non Sq Epi: x / Bacteria: NEGATIVE        Venous Blood Gas:   @ 04:00  7.20/52/17/16/17.3  VBG Lactate: 6.6  Venous Blood Gas:   @ 02:30  7.13/55/< 24/14/23.2  VBG Lactate: 7.3      MICROBIOLOGY:     RADIOLOGY:  [ ] Reviewed and interpreted by me    EKG: CHIEF COMPLAINT: Unwitnessed fall, AMS, hypoxic respiratory failure     HPI: 88M with HLD, DM, hard of hearing presented with AMS and respiratory distress following an unwitnessed fall yesterday. Pt awake and tracking but not following commands and nonverbal despite presence of Bosnian-speaking resident speaking loudly at bedside. Attempted to call son but no answer so relied exclusively on ED documentation, no prior hospital visits. EMR saying that son at bedside reported that pt is functional and alert at baseline, was his usual self until 10 pm when family heard a thud and found pt down in the bathroom. Pt was initially lethargic in the ED but was placed on BIPAP with improvement in his respiratory status and alacrity, though he remains confused.      In the ED, pt was tachypneic initially to the high 20s-30s and hypoxic, now comfortable on 50% 10/5. He was aferbile, tachycardic to the low 100s, had several soft BP readings in the 90s systolic and one in the 70s systolic but BP is now stable 110s-120s systolic.     PAST MEDICAL & SURGICAL HISTORY: HLD, DM       FAMILY HISTORY: Unknown       SOCIAL HISTORY:  Smoking: unknwon  Substance Use: unknwon  EtOH Use: unknwon  Per EMR fully functional at baseline     Allergies    No Known Allergies    Intolerances        HOME MEDICATIONS:    REVIEW OF SYSTEMS:  [ x ] Unable to assess ROS because currently nonverbal, even in native [place] (native speaking resident Dr. Patel at bedside)    OBJECTIVE:  ICU Vital Signs Last 24 Hrs  T(C): 35.8 (14 Mar 2019 01:40), Max: 37.2 (14 Mar 2019 00:11)  T(F): 96.4 (14 Mar 2019 01:40), Max: 99 (14 Mar 2019 00:11)  HR: 99 (14 Mar 2019 03:48) (99 - 111)  BP: 129/72 (14 Mar 2019 03:33) (74/44 - 129/72)  BP(mean): --  ABP: --  ABP(mean): --  RR: 24 (14 Mar 2019 03:33) (18 - 30)  SpO2: 100% (14 Mar 2019 03:48) (94% - 100%)        CAPILLARY BLOOD GLUCOSE      POCT Blood Glucose.: 372 mg/dL (14 Mar 2019 00:57)      PHYSICAL EXAM:  General: confused, NAD on BIPAP breathing in low 20s  HEENT: PERRL  Lymph Nodes: no cervical lymphadenopathy  Neck: full ROM, no JVD  Respiratory: diffuse rhonchi bilaterally   Cardiovascular: tachycardic, regular rhythm, no murmurs  Abdomen: +BS, soft, NTND  Extremities: no clubbing, cyanosis, or edema  Skin: no visible bruises or rash  Neurological: nonverbal, tracking but not following commands in native language, moving all extremities  Psychiatry: confused, mildly agitated trying to take off bipap    LINES:     HOSPITAL MEDICATIONS:    vancomycin  IVPB 1000 milliGRAM(s) IV Intermittent once                  potassium chloride  10 mEq/100 mL IVPB 10 milliEquivalent(s) IV Intermittent once  potassium chloride  10 mEq/100 mL IVPB 10 milliEquivalent(s) IV Intermittent once            LABS:                        11.6   9.80  )-----------( 95       ( 14 Mar 2019 01:45 )             37.5     Hgb Trend: 11.6<--  03-14    147<H>  |  118<H>  |  13  ----------------------------<  327<H>  2.2<LL>   |  12<L>  |  0.89    Ca    5.1<LL>      14 Mar 2019 01:45    TPro  3.6<L>  /  Alb  1.8<L>  /  TBili  0.4  /  DBili  x   /  AST  12  /  ALT  9   /  AlkPhos  49  03-14    Creatinine Trend: 0.89<--  PT/INR - ( 14 Mar 2019 01:45 )   PT: 15.5 SEC;   INR: 1.38          PTT - ( 14 Mar 2019 01:45 )  PTT:37.1 SEC  Urinalysis Basic - ( 14 Mar 2019 02:00 )    Color: LIGHT YELLOW / Appearance: CLEAR / S.018 / pH: 6.0  Gluc: >1000 / Ketone: NEGATIVE  / Bili: NEGATIVE / Urobili: NORMAL   Blood: TRACE / Protein: 100 / Nitrite: NEGATIVE   Leuk Esterase: NEGATIVE / RBC: 3-5 / WBC 0-2   Sq Epi: FEW / Non Sq Epi: x / Bacteria: NEGATIVE        Venous Blood Gas:   @ 04:00  7.20/52/17/16/17.3  VBG Lactate: 6.6  Venous Blood Gas:   @ 02:30  7.13/55/< 24/14/23.2  VBG Lactate: 7.3      MICROBIOLOGY:     RADIOLOGY:  [ ] Reviewed and interpreted by me    EKG: CHIEF COMPLAINT: Unwitnessed fall, AMS, hypoxic respiratory failure     HPI: 88M with HLD, DM, hard of hearing presented with AMS and respiratory distress following an unwitnessed fall yesterday. Pt awake and tracking but not following commands and nonverbal despite presence of Bosnian-speaking resident speaking loudly at bedside. Attempted to call son but no answer so relied exclusively on ED documentation, no prior hospital visits. EMR saying that son at bedside reported that pt is functional and alert at baseline, was his usual self until 10 pm when family heard a thud and found pt down in the bathroom. Pt was initially lethargic in the ED but was placed on BIPAP with improvement in his respiratory status and alacrity, though he remains confused.      In the ED, pt was tachypneic initially to the high 20s-30s and hypoxic, now comfortable on 50% 10/5. He was aferbile, tachycardic to the low 100s, had several soft BP readings in the 90s systolic and one in the 70s systolic but BP is now stable 110s-120s systolic. Labs notable for mixed respiratory and metabolic acidosis with pH 7.13 now 7.2, lactate 7.3 improved to 6.6, BHB 0.8, glucose 400s-500s, inconsistent electrolytes with K of 2.2 on BMP but 5 on VBG. EKG unremarkable, trop 37 without CK elevation, CT c/a/p with possible multifocal pna, L femoral neck fx.     PAST MEDICAL & SURGICAL HISTORY: HLD, DM       FAMILY HISTORY: Unknown       SOCIAL HISTORY:  Smoking: unknwon  Substance Use: unknwon  EtOH Use: unknwon  Per EMR fully functional at baseline     Allergies    No Known Allergies    Intolerances        HOME MEDICATIONS:    REVIEW OF SYSTEMS:  [ x ] Unable to assess ROS because currently nonverbal, even in native [place] (native speaking resident Dr. Patel at bedside)    OBJECTIVE:  ICU Vital Signs Last 24 Hrs  T(C): 35.8 (14 Mar 2019 01:40), Max: 37.2 (14 Mar 2019 00:11)  T(F): 96.4 (14 Mar 2019 01:40), Max: 99 (14 Mar 2019 00:11)  HR: 99 (14 Mar 2019 03:48) (99 - 111)  BP: 129/72 (14 Mar 2019 03:33) (74/44 - 129/72)  BP(mean): --  ABP: --  ABP(mean): --  RR: 24 (14 Mar 2019 03:33) (18 - 30)  SpO2: 100% (14 Mar 2019 03:48) (94% - 100%)        CAPILLARY BLOOD GLUCOSE      POCT Blood Glucose.: 372 mg/dL (14 Mar 2019 00:57)      PHYSICAL EXAM:  General: confused, NAD on BIPAP breathing in low 20s  HEENT: PERRL  Lymph Nodes: no cervical lymphadenopathy  Neck: full ROM, no JVD  Respiratory: diffuse rhonchi bilaterally   Cardiovascular: tachycardic, regular rhythm, no murmurs  Abdomen: +BS, soft, NTND  Extremities: no clubbing, cyanosis, or edema  Skin: no visible bruises or rash  Neurological: nonverbal, tracking but not following commands in native language, moving all extremities  Psychiatry: confused, mildly agitated trying to take off bipap    LINES: peripherals    HOSPITAL MEDICATIONS:    vancomycin  IVPB 1000 milliGRAM(s) IV Intermittent once                  potassium chloride  10 mEq/100 mL IVPB 10 milliEquivalent(s) IV Intermittent once  potassium chloride  10 mEq/100 mL IVPB 10 milliEquivalent(s) IV Intermittent once            LABS:                        11.6   9.80  )-----------( 95       ( 14 Mar 2019 01:45 )             37.5     Hgb Trend: 11.6<--  03-14    147<H>  |  118<H>  |  13  ----------------------------<  327<H>  2.2<LL>   |  12<L>  |  0.89    Ca    5.1<LL>      14 Mar 2019 01:45    TPro  3.6<L>  /  Alb  1.8<L>  /  TBili  0.4  /  DBili  x   /  AST  12  /  ALT  9   /  AlkPhos  49  03-14    Creatinine Trend: 0.89<--  PT/INR - ( 14 Mar 2019 01:45 )   PT: 15.5 SEC;   INR: 1.38          PTT - ( 14 Mar 2019 01:45 )  PTT:37.1 SEC  Urinalysis Basic - ( 14 Mar 2019 02:00 )    Color: LIGHT YELLOW / Appearance: CLEAR / S.018 / pH: 6.0  Gluc: >1000 / Ketone: NEGATIVE  / Bili: NEGATIVE / Urobili: NORMAL   Blood: TRACE / Protein: 100 / Nitrite: NEGATIVE   Leuk Esterase: NEGATIVE / RBC: 3-5 / WBC 0-2   Sq Epi: FEW / Non Sq Epi: x / Bacteria: NEGATIVE        Venous Blood Gas:   @ 04:00  7.20/52/17/16/17.3  VBG Lactate: 6.6  Venous Blood Gas:   @ 02:30  7.13/55/< 24/14/23.2  VBG Lactate: 7.3      MICROBIOLOGY: cx in lab    RADIOLOGY:  [ x ] Reviewed and interpreted by me    EKG: sinus tachycardia without ischemic abnormalities CHIEF COMPLAINT: Unwitnessed fall, AMS, hypoxic respiratory failure     HPI: 88M with HLD, DM, hard of hearing presented with AMS and respiratory distress following an unwitnessed fall yesterday. Pt awake and tracking, [facility] (native speaking resident at bedside), still confused. Attempted to call son but no answer so relied exclusively on ED documentation, no prior hospital visits. EMR saying that son at bedside reported that pt is functional and alert at baseline, was his usual self until 10 pm when family heard a thud and found pt down in the bathroom. Pt was initially lethargic in the ED but was placed on BIPAP with improvement in his respiratory status and alacrity, though he remains confused.      In the ED, pt was tachypneic initially to the high 20s-30s and hypoxic, now comfortable on 50% 10/5. He was aferbile, tachycardic to the low 100s, had several soft BP readings in the 90s systolic and one in the 70s systolic but BP is now stable 110s-120s systolic. Labs notable for mixed respiratory and metabolic acidosis with pH 7.13 now 7.34, lactate 7.3 improved to 4.4, BHB 0.8, glucose 400s-500s, inconsistent electrolytes with K of 2.2 on BMP but 5 on ABG. EKG unremarkable, trop 37 without CK elevation, CT c/a/p with possible multifocal pna, L femoral neck fx.     PAST MEDICAL & SURGICAL HISTORY: HLD, DM       FAMILY HISTORY: Unknown       SOCIAL HISTORY:  Smoking: unknwon  Substance Use: unknwon  EtOH Use: unknwon  Per EMR fully functional at baseline     Allergies    No Known Allergies    Intolerances        HOME MEDICATIONS:    REVIEW OF SYSTEMS:  [ x ] Unable to assess ROS because currently nonverbal, even [facility] (native speaking resident Dr. Patel at bedside)    OBJECTIVE:  ICU Vital Signs Last 24 Hrs  T(C): 35.8 (14 Mar 2019 01:40), Max: 37.2 (14 Mar 2019 00:11)  T(F): 96.4 (14 Mar 2019 01:40), Max: 99 (14 Mar 2019 00:11)  HR: 99 (14 Mar 2019 03:48) (99 - 111)  BP: 129/72 (14 Mar 2019 03:33) (74/44 - 129/72)  BP(mean): --  ABP: --  ABP(mean): --  RR: 24 (14 Mar 2019 03:33) (18 - 30)  SpO2: 100% (14 Mar 2019 03:48) (94% - 100%)        CAPILLARY BLOOD GLUCOSE      POCT Blood Glucose.: 372 mg/dL (14 Mar 2019 00:57)      PHYSICAL EXAM:  General: confused, NAD on BIPAP breathing in low 20s  HEENT: PERRL  Lymph Nodes: no cervical lymphadenopathy  Neck: full ROM, no JVD  Respiratory: diffuse rhonchi bilaterally   Cardiovascular: tachycardic, regular rhythm, no murmurs  Abdomen: +BS, soft, NTND  Extremities: no clubbing, cyanosis, or edema  Skin: no visible bruises or rash  Neurological: nonverbal, tracking but not following commands in native language, moving all extremities  Psychiatry: confused, mildly agitated trying to take off bipap    LINES: peripherals    HOSPITAL MEDICATIONS:    vancomycin  IVPB 1000 milliGRAM(s) IV Intermittent once                  potassium chloride  10 mEq/100 mL IVPB 10 milliEquivalent(s) IV Intermittent once  potassium chloride  10 mEq/100 mL IVPB 10 milliEquivalent(s) IV Intermittent once            LABS:                        11.6   9.80  )-----------( 95       ( 14 Mar 2019 01:45 )             37.5     Hgb Trend: 11.6<--  03-14    147<H>  |  118<H>  |  13  ----------------------------<  327<H>  2.2<LL>   |  12<L>  |  0.89    Ca    5.1<LL>      14 Mar 2019 01:45    TPro  3.6<L>  /  Alb  1.8<L>  /  TBili  0.4  /  DBili  x   /  AST  12  /  ALT  9   /  AlkPhos  49  03-14    Creatinine Trend: 0.89<--  PT/INR - ( 14 Mar 2019 01:45 )   PT: 15.5 SEC;   INR: 1.38          PTT - ( 14 Mar 2019 01:45 )  PTT:37.1 SEC  Urinalysis Basic - ( 14 Mar 2019 02:00 )    Color: LIGHT YELLOW / Appearance: CLEAR / S.018 / pH: 6.0  Gluc: >1000 / Ketone: NEGATIVE  / Bili: NEGATIVE / Urobili: NORMAL   Blood: TRACE / Protein: 100 / Nitrite: NEGATIVE   Leuk Esterase: NEGATIVE / RBC: 3-5 / WBC 0-2   Sq Epi: FEW / Non Sq Epi: x / Bacteria: NEGATIVE        Venous Blood Gas:   @ 04:00  7.20/52/17/16/17.3  VBG Lactate: 6.6  Venous Blood Gas:   @ 02:30  7.13/55/< 24/14/23.2  VBG Lactate: 7.3      MICROBIOLOGY: cx in lab    RADIOLOGY:  [ x ] Reviewed and interpreted by me    < from: CT Chest w/ IV Cont (19 @ 03:38) >  Bilateral multifocal airspace opacities predominantly in dependent areas.   Multifocal pneumonia can have this appearance. However in the trauma  setting, these findings are likely indicating pulmonary contusion with   atelectasis.    Acute left transcervical left femoral neck fracture with mild   superolateral displacement of the distal fracture fragment.     These findings were discussed by Dr. Bah with Dr. Keller at   3/14/2019 4:08 AM with read back confirmation.    < end of copied text >      < from: CT Head No Cont (19 @ 03:36) >  MPRESSION:     Head:  No acute intracranial hemorrhage or mass effect.    Cervical spine:  No acute fracture or traumatic malalignment of the cervical spine.  Patchy opacities in the bilateral lung apices. Please see dedicated CT   chest report for further evaluation.    < end of copied text >        EKG: sinus tachycardia without ischemic abnormalities

## 2019-03-14 NOTE — ED ADULT NURSE REASSESSMENT NOTE - NS ED NURSE REASSESS COMMENT FT1
House Staff 2 at bedside evaluating patient. Team made aware of patient's vital signs. Pt tolerating bipap at this time. Will continue to monitor.

## 2019-03-14 NOTE — ED ADULT NURSE REASSESSMENT NOTE - NS ED NURSE REASSESS COMMENT FT1
Report received from night RN. Pt AxOx0. Pt on enhanced supervision. Pt tolerating bipap. O2 saturation 100% at this time. IVL clear and patent. Respiratory therapist at bedside adjusting bipap settings as per orders. Garcia draining clear yellow urine. Will continue to monitor.

## 2019-03-15 PROBLEM — I82.409 ACUTE EMBOLISM AND THROMBOSIS OF UNSPECIFIED DEEP VEINS OF UNSPECIFIED LOWER EXTREMITY: Chronic | Status: ACTIVE | Noted: 2019-03-14

## 2019-03-15 PROBLEM — Z00.00 ENCOUNTER FOR PREVENTIVE HEALTH EXAMINATION: Status: ACTIVE | Noted: 2019-03-15

## 2019-03-15 PROBLEM — C44.90 UNSPECIFIED MALIGNANT NEOPLASM OF SKIN, UNSPECIFIED: Chronic | Status: ACTIVE | Noted: 2019-03-14

## 2019-03-15 LAB
ANION GAP SERPL CALC-SCNC: 18 MMO/L — HIGH (ref 7–14)
BACTERIA UR CULT: SIGNIFICANT CHANGE UP
BASE EXCESS BLDV CALC-SCNC: -6.2 MMOL/L — SIGNIFICANT CHANGE UP
BLOOD GAS VENOUS - CREATININE: 2.09 MG/DL — HIGH (ref 0.5–1.3)
BUN SERPL-MCNC: 31 MG/DL — HIGH (ref 7–23)
CALCIUM SERPL-MCNC: 8.5 MG/DL — SIGNIFICANT CHANGE UP (ref 8.4–10.5)
CHLORIDE BLDV-SCNC: 114 MMOL/L — HIGH (ref 96–108)
CHLORIDE SERPL-SCNC: 107 MMOL/L — SIGNIFICANT CHANGE UP (ref 98–107)
CO2 SERPL-SCNC: 16 MMOL/L — LOW (ref 22–31)
CREAT SERPL-MCNC: 2.14 MG/DL — HIGH (ref 0.5–1.3)
GAS PNL BLDV: 133 MMOL/L — LOW (ref 136–146)
GLUCOSE BLDV-MCNC: 194 — HIGH (ref 70–99)
GLUCOSE SERPL-MCNC: 185 MG/DL — HIGH (ref 70–99)
HCO3 BLDV-SCNC: 19 MMOL/L — LOW (ref 20–27)
HCT VFR BLD CALC: 44.4 % — SIGNIFICANT CHANGE UP (ref 39–50)
HCT VFR BLDV CALC: 46.1 % — SIGNIFICANT CHANGE UP (ref 39–51)
HGB BLD-MCNC: 15 G/DL — SIGNIFICANT CHANGE UP (ref 13–17)
HGB BLDV-MCNC: 15 G/DL — SIGNIFICANT CHANGE UP (ref 13–17)
LACTATE BLDV-MCNC: 3.4 MMOL/L — HIGH (ref 0.5–2)
MAGNESIUM SERPL-MCNC: 2.7 MG/DL — HIGH (ref 1.6–2.6)
MCHC RBC-ENTMCNC: 30.5 PG — SIGNIFICANT CHANGE UP (ref 27–34)
MCHC RBC-ENTMCNC: 33.8 % — SIGNIFICANT CHANGE UP (ref 32–36)
MCV RBC AUTO: 90.2 FL — SIGNIFICANT CHANGE UP (ref 80–100)
NRBC # FLD: 0 K/UL — LOW (ref 25–125)
PCO2 BLDV: 34 MMHG — LOW (ref 41–51)
PH BLDV: 7.36 PH — SIGNIFICANT CHANGE UP (ref 7.32–7.43)
PHOSPHATE SERPL-MCNC: 1.8 MG/DL — LOW (ref 2.5–4.5)
PLATELET # BLD AUTO: 111 K/UL — LOW (ref 150–400)
PMV BLD: 10.9 FL — SIGNIFICANT CHANGE UP (ref 7–13)
PO2 BLDV: 33 MMHG — LOW (ref 35–40)
POTASSIUM BLDV-SCNC: 4 MMOL/L — SIGNIFICANT CHANGE UP (ref 3.4–4.5)
POTASSIUM SERPL-MCNC: 4.5 MMOL/L — SIGNIFICANT CHANGE UP (ref 3.5–5.3)
POTASSIUM SERPL-SCNC: 4.5 MMOL/L — SIGNIFICANT CHANGE UP (ref 3.5–5.3)
RBC # BLD: 4.92 M/UL — SIGNIFICANT CHANGE UP (ref 4.2–5.8)
RBC # FLD: 13.6 % — SIGNIFICANT CHANGE UP (ref 10.3–14.5)
SAO2 % BLDV: 58.2 % — LOW (ref 60–85)
SODIUM SERPL-SCNC: 141 MMOL/L — SIGNIFICANT CHANGE UP (ref 135–145)
SPECIMEN SOURCE: SIGNIFICANT CHANGE UP
VANCOMYCIN FLD-MCNC: 8.1 UG/ML — SIGNIFICANT CHANGE UP
WBC # BLD: 9.85 K/UL — SIGNIFICANT CHANGE UP (ref 3.8–10.5)
WBC # FLD AUTO: 9.85 K/UL — SIGNIFICANT CHANGE UP (ref 3.8–10.5)

## 2019-03-15 PROCEDURE — 99233 SBSQ HOSP IP/OBS HIGH 50: CPT | Mod: GC

## 2019-03-15 PROCEDURE — 93970 EXTREMITY STUDY: CPT | Mod: 26

## 2019-03-15 RX ORDER — INSULIN LISPRO 100/ML
VIAL (ML) SUBCUTANEOUS AT BEDTIME
Qty: 0 | Refills: 0 | Status: DISCONTINUED | OUTPATIENT
Start: 2019-03-15 | End: 2019-03-27

## 2019-03-15 RX ORDER — INSULIN LISPRO 100/ML
VIAL (ML) SUBCUTANEOUS
Qty: 0 | Refills: 0 | Status: DISCONTINUED | OUTPATIENT
Start: 2019-03-15 | End: 2019-03-27

## 2019-03-15 RX ORDER — VANCOMYCIN HCL 1 G
1000 VIAL (EA) INTRAVENOUS ONCE
Qty: 0 | Refills: 0 | Status: COMPLETED | OUTPATIENT
Start: 2019-03-15 | End: 2019-03-15

## 2019-03-15 RX ORDER — CALCIUM GLUCONATE 100 MG/ML
2 VIAL (ML) INTRAVENOUS ONCE
Qty: 0 | Refills: 0 | Status: DISCONTINUED | OUTPATIENT
Start: 2019-03-15 | End: 2019-03-15

## 2019-03-15 RX ORDER — INSULIN GLARGINE 100 [IU]/ML
10 INJECTION, SOLUTION SUBCUTANEOUS AT BEDTIME
Qty: 0 | Refills: 0 | Status: DISCONTINUED | OUTPATIENT
Start: 2019-03-15 | End: 2019-03-16

## 2019-03-15 RX ORDER — LANOLIN ALCOHOL/MO/W.PET/CERES
6 CREAM (GRAM) TOPICAL ONCE
Qty: 0 | Refills: 0 | Status: COMPLETED | OUTPATIENT
Start: 2019-03-15 | End: 2019-03-15

## 2019-03-15 RX ORDER — LANOLIN ALCOHOL/MO/W.PET/CERES
6 CREAM (GRAM) TOPICAL ONCE
Qty: 0 | Refills: 0 | Status: DISCONTINUED | OUTPATIENT
Start: 2019-03-15 | End: 2019-03-15

## 2019-03-15 RX ORDER — HUMAN INSULIN 100 [IU]/ML
5 INJECTION, SUSPENSION SUBCUTANEOUS ONCE
Qty: 0 | Refills: 0 | Status: DISCONTINUED | OUTPATIENT
Start: 2019-03-15 | End: 2019-03-15

## 2019-03-15 RX ORDER — POTASSIUM CHLORIDE 20 MEQ
10 PACKET (EA) ORAL
Qty: 0 | Refills: 0 | Status: COMPLETED | OUTPATIENT
Start: 2019-03-15 | End: 2019-03-15

## 2019-03-15 RX ORDER — ACETAMINOPHEN 500 MG
1000 TABLET ORAL ONCE
Qty: 0 | Refills: 0 | Status: COMPLETED | OUTPATIENT
Start: 2019-03-15 | End: 2019-03-15

## 2019-03-15 RX ORDER — POTASSIUM PHOSPHATE, MONOBASIC POTASSIUM PHOSPHATE, DIBASIC 236; 224 MG/ML; MG/ML
15 INJECTION, SOLUTION INTRAVENOUS ONCE
Qty: 0 | Refills: 0 | Status: DISCONTINUED | OUTPATIENT
Start: 2019-03-15 | End: 2019-03-16

## 2019-03-15 RX ORDER — ACETAMINOPHEN 500 MG
650 TABLET ORAL EVERY 6 HOURS
Qty: 0 | Refills: 0 | Status: DISCONTINUED | OUTPATIENT
Start: 2019-03-15 | End: 2019-03-29

## 2019-03-15 RX ADMIN — Medication 250 MILLIGRAM(S): at 17:45

## 2019-03-15 RX ADMIN — HEPARIN SODIUM 5000 UNIT(S): 5000 INJECTION INTRAVENOUS; SUBCUTANEOUS at 21:41

## 2019-03-15 RX ADMIN — AZITHROMYCIN 250 MILLIGRAM(S): 500 TABLET, FILM COATED ORAL at 13:05

## 2019-03-15 RX ADMIN — Medication 3 MILLILITER(S): at 13:17

## 2019-03-15 RX ADMIN — PIPERACILLIN AND TAZOBACTAM 25 GRAM(S): 4; .5 INJECTION, POWDER, LYOPHILIZED, FOR SOLUTION INTRAVENOUS at 00:10

## 2019-03-15 RX ADMIN — HEPARIN SODIUM 5000 UNIT(S): 5000 INJECTION INTRAVENOUS; SUBCUTANEOUS at 05:49

## 2019-03-15 RX ADMIN — Medication 3 MILLILITER(S): at 20:33

## 2019-03-15 RX ADMIN — Medication 1 PACKET(S): at 02:38

## 2019-03-15 RX ADMIN — Medication 3 MILLILITER(S): at 09:18

## 2019-03-15 RX ADMIN — Medication 1 PACKET(S): at 02:23

## 2019-03-15 RX ADMIN — Medication 2: at 06:01

## 2019-03-15 RX ADMIN — Medication 4: at 13:19

## 2019-03-15 RX ADMIN — Medication 3 MILLILITER(S): at 16:19

## 2019-03-15 RX ADMIN — Medication 3 MILLILITER(S): at 04:38

## 2019-03-15 RX ADMIN — INSULIN GLARGINE 10 UNIT(S): 100 INJECTION, SOLUTION SUBCUTANEOUS at 21:49

## 2019-03-15 RX ADMIN — Medication 3 MILLILITER(S): at 01:09

## 2019-03-15 RX ADMIN — Medication 100 MILLIEQUIVALENT(S): at 15:42

## 2019-03-15 RX ADMIN — Medication 100 MILLIEQUIVALENT(S): at 11:01

## 2019-03-15 RX ADMIN — Medication 400 MILLIGRAM(S): at 15:43

## 2019-03-15 RX ADMIN — PIPERACILLIN AND TAZOBACTAM 25 GRAM(S): 4; .5 INJECTION, POWDER, LYOPHILIZED, FOR SOLUTION INTRAVENOUS at 16:18

## 2019-03-15 RX ADMIN — HEPARIN SODIUM 5000 UNIT(S): 5000 INJECTION INTRAVENOUS; SUBCUTANEOUS at 15:45

## 2019-03-15 RX ADMIN — Medication 50 GRAM(S): at 04:02

## 2019-03-15 RX ADMIN — Medication 100 MILLIEQUIVALENT(S): at 13:04

## 2019-03-15 RX ADMIN — Medication 2: at 21:49

## 2019-03-15 RX ADMIN — Medication 1000 MILLIGRAM(S): at 16:12

## 2019-03-15 NOTE — PROGRESS NOTE ADULT - ASSESSMENT
Assessment and Plan:   Pt is a 87yo [place]ian-speaking M with PMHx of DM, hard of hearing presenting for presented with AMS and respiratory distress following an unwitnessed fall, found to have L femoral neck fracture, sepsis likely 2/2 multifocal PNA, and acute Hypoxic and hypercarbic respiratory failure.    #Acute Hypoxic and Hypercarbic Respiratory Failure   - respiratory distress, coarse rhonchi, CT showing multifocal PNA   - vanc by level given new AHVEN, zosyn q12 due to Cr Cl  - on BIPAP, mentation improving; reduced settings given hypocarbia on recent ABG   - standing duonebs     #Sepsis  - multifocal PNA, lactic acidosis  - mixed respiratory and metabolic acidosis with pH 7.13,elevated lactate to 7  - with ~3L+ fluid and BIPAP, pH improving to 7.4; lactate 4.6  - s/p NS x2L, LRx1; additional bolus NS N1L now  - blood cx pending  - UA, RVP negative     #Hyperglycemia  - lantus given around 11 = 10u; will redose tomorrow AM  - high ISS, fingersticks q6 while NPO   - no ketones, acidosis improving with IVF    #Elevated troponin  - elevated on admission, increasing 37 at 2AM -> 93 at 11:30AM  - no chest pain, likely in setting of demand ischemia  - trend     #HAVEN  - initial Cr .86, now doubled  - likely prerenal  - making urine, strict I&Os given hypotension  - urine lytes   - renally dose meds     # Confusion  - improving with BIPAP, 2/2 from hypercarbia  - CTH negative for acute pathology     #L femoral neck fracture  - ortho appreciate recs  - pending medical stabilization  - tylenol for pain control     #prophylactic measure  - DVT - will start prophylaxis after recent CBC  - Advanced Directive: son at bedside, extensive conversation regarding current critical medical illness, son understood, DNR/DNI, MOLST in chart Assessment and Plan:   Pt is a 89yo [place]nian-speaking M with PMHx of DM, hard of hearing presenting for presented with AMS and respiratory distress following an unwitnessed fall, found to have L femoral neck fracture, sepsis likely 2/2 multifocal PNA, and acute Hypoxic and hypercarbic respiratory failure; improving on BIPAP and abx    #Acute Hypoxic and Hypercarbic Respiratory Failure   - respiratory distress, coarse rhonchi, CT showing multifocal PNA   - vanc by level given new HAVEN, zosyn q12 due to Cr Cl  - on BIPAP, mentation improving; reduced settings given hypocarbia on recent ABG, likely compensating for metabolic acidosis. hypercarbia has resolved.    - standing duonebs   - take of BIPAP, wean supplemental oxygen as patient can tolerate   - Chest PT as pt can tolerate    #Sepsis  - multifocal PNA, lactic acidosis  - mixed respiratory and metabolic acidosis with pH 7.13,elevated lactate to 7  - with ~3L+ fluid and BIPAP, pH improving to 7.4; lactate 4.6  - NS @ 100, lactate trending down   - s/p NS x2.5L, LRx1  - blood cx pending  - UA, RVP negative     #Hyperglycemia  - lantus given yesterday  - FS <200 this AM, NPH 5; will dose lantus 10 tonight   - high ISS, fingersticks q6 while NPO   - no ketones, acidosis improving with IVF    #Elevated troponin  - elevated on admission, increasing 37 at 2AM -> 93, now trending down  - no chest pain, likely in setting of demand ischemia    #HAVEN  - initial Cr .86, now doubled  - likely prerenal, possible contrast induced nephropathy from CT   - making urine, strict I&Os given hypotension  - urine lytes   - renally dose meds     # Confusion  - improving with BIPAP, 2/2 from hypercarbia  - CTH negative for acute pathology     #L femoral neck fracture  - ortho appreciate recs: if medically optimized, will go to OR next week   - pending medical stabilization  - tylenol for pain control     #prophylactic measure  - DVT - HSQ (consider lovenox given fracture, however HAVEN)  - Advanced Directive: son at bedside, extensive conversation regarding current critical medical illness, son understood, DNR/DNI, MOLST in chart

## 2019-03-15 NOTE — PROGRESS NOTE ADULT - ATTENDING COMMENTS
I agree with the above note on this patient. All pertinent films have been reviewed. Please refer to clinical documentation of the history, physical examinations, data summary, and both assessment and plan as documented above and with which I agree.    OR pending resolution of acute medical issues    Abdiel Shabazz MD  Attending Orthopedic Surgeon

## 2019-03-15 NOTE — PROGRESS NOTE ADULT - ATTENDING COMMENTS
Patient was seen and examined personally by me. I have discussed the plan and reviewed the Resident's note and agree with the above physical exam findings including assessment and plan except as indicated below. Labs and imagining reviewed.     88F presented after fall. found to have hypercarbic/hypoxic respiratory failure secondary to multifocal pna. was on BiPap, now on O2 via venti with appropriate saturation. c/w IV abx. doppler neg for DVT. Hip fx require surgical intervention, but currently NOT medically optimized for surgery. HAVEN with slowly uptrending Cr. c/w IVF. monitor renal function. avoid nephrotoxic agents. Renal consult if continues to worsen. TTE pending.

## 2019-03-15 NOTE — PROGRESS NOTE ADULT - SUBJECTIVE AND OBJECTIVE BOX
Orthopedic Surgery Progress Note  No acute events overnight.  Continues to be on bipap in setting of respiratory failure 2/2 multifocal PNA. Patient's family expressed that they would like to undergo L hip hemiarthroplasty for FN fx when patient cleared for surgery.    O:  Vital Signs Last 24 Hrs  T(C): 37.1 (15 Mar 2019 05:47), Max: 37.6 (14 Mar 2019 09:47)  T(F): 98.7 (15 Mar 2019 05:47), Max: 99.7 (14 Mar 2019 09:47)  HR: 106 (15 Mar 2019 05:47) (78 - 120)  BP: 139/74 (15 Mar 2019 05:47) (93/67 - 158/81)  RR: 25 (15 Mar 2019 05:47) (18 - 25)  SpO2: 97% (15 Mar 2019 05:47) (93% - 100%)    Gen: NAD  LLE  skin intact  shortened/ER  patient otherwise non-compliant with exam  WWP distally    Labs:                        11.0   7.75  )-----------( 79       ( 14 Mar 2019 21:41 )             34.6                         16.0   13.34 )-----------( 119      ( 14 Mar 2019 04:53 )             47.9     03-14    144  |  122<H>  |  21  ----------------------------<  162<H>  3.1<L>   |  13<L>  |  1.54<H>    PT/INR - ( 14 Mar 2019 01:45 )   PT: 15.5 SEC;   INR: 1.38        PTT - ( 14 Mar 2019 01:45 )  PTT:37.1 SEC    A/P 88y year old male s/p with L femoral neck fracture and multifocal PNA currently on BiPAP. Patient is currently not cleared for surgical intervention but family would like to proceed with L hip hemiarthroplasty when medically optimized.    Pain Control  DVT PPX per primary  NWB LLE  Continue abx and supportive care for PNA  Please document medical clearance when patient has been optimized  Possible OR 3/18 for L hip hemiarthroplasty  Consent needs to be obtained  DNR/DNI  Dispo planning    Dylan Curry MD

## 2019-03-15 NOTE — PROGRESS NOTE ADULT - SUBJECTIVE AND OBJECTIVE BOX
****************************  Kanika Schumacher, PGY-1  [facility] Pager #: [phone]  [place] Pager #: [phone]  ****************************    Patient is a 88y old  Male who presents with a chief complaint of fall (15 Mar 2019 06:31)     INTERVAL HPI/OVERNIGHT EVENTS: attempted to get patient off BIPAP, however became very hypoxic (80s) and patient placed back on BIPAP.       MEDICATIONS  (STANDING):  ALBUTerol/ipratropium for Nebulization 3 milliLiter(s) Nebulizer every 4 hours  azithromycin  IVPB 500 milliGRAM(s) IV Intermittent every 24 hours  azithromycin  IVPB      calcium acetate 667 milliGRAM(s) Oral three times a day with meals  dextrose 5%. 1000 milliLiter(s) (50 mL/Hr) IV Continuous <Continuous>  dextrose 50% Injectable 12.5 Gram(s) IV Push once  dextrose 50% Injectable 25 Gram(s) IV Push once  dextrose 50% Injectable 25 Gram(s) IV Push once  heparin  Injectable 5000 Unit(s) SubCutaneous every 8 hours  insulin lispro (HumaLOG) corrective regimen sliding scale   SubCutaneous every 6 hours  piperacillin/tazobactam IVPB. 3.375 Gram(s) IV Intermittent every 12 hours  sodium chloride 0.9%. 1000 milliLiter(s) (100 mL/Hr) IV Continuous <Continuous>    MEDICATIONS  (PRN):  dextrose 40% Gel 15 Gram(s) Oral once PRN Blood Glucose LESS THAN 70 milliGRAM(s)/deciliter  glucagon  Injectable 1 milliGRAM(s) IntraMuscular once PRN Glucose LESS THAN 70 milligrams/deciliter    Allergies:  No Known Allergies    Intolerances:    VITAL SIGNS:  T(C): 37.1 (03-15-19 @ 05:47), Max: 37.6 (03-14-19 @ 09:47)  HR: 106 (03-15-19 @ 05:47) (78 - 120)  BP: 139/74 (03-15-19 @ 05:47) (93/67 - 158/81)  RR: 25 (03-15-19 @ 05:47) (18 - 25)  SpO2: 97% (03-15-19 @ 05:47) (93% - 100%)    PHYSICAL EXAM:  General: NAD, BIPAP in place  Chest: B/L Rhonchi  Heart: Regular rate and rhythm; no murmurs, rubs, or gallops  Abd: +BS, soft, nontender, nondistended  Nervous System: AAOX3  Psych: Appropriate affect and mood  Ext:  no LE edema    LABS:                        11.0   7.75  )-----------( 79       ( 14 Mar 2019 21:41 )             34.6     14 Mar 2019 21:41    144    |  122    |  21     ----------------------------<  162    3.1     |  13     |  1.54     Ca    5.9        14 Mar 2019 21:41  Phos  0.8       14 Mar 2019 21:41  Mg     1.2       14 Mar 2019 21:41      CAPILLARY BLOOD GLUCOSE      POCT Blood Glucose.: 194 mg/dL (15 Mar 2019 05:54)  POCT Blood Glucose.: 137 mg/dL (14 Mar 2019 23:58)  POCT Blood Glucose.: 301 mg/dL (14 Mar 2019 18:44)  POCT Blood Glucose.: 346 mg/dL (14 Mar 2019 13:51)  POCT Blood Glucose.: 381 mg/dL (14 Mar 2019 11:17)  POCT Blood Glucose.: 394 mg/dL (14 Mar 2019 07:58)    BLOOD CULTURE  03-14 @ 02:15 --    NO ORGANISMS ISOLATED  NO ORGANISMS ISOLATED AT 24 HOURS  --    RADIOLOGY & ADDITIONAL TESTS:    Imaging Personally Reviewed:  [ ] YES     Consultant(s) Notes Reviewed:      Care Discussed with Consultants/Other Providers: ****************************  Kanika Schumacher, PGY-1  [facility] Pager #: [phone]  [facility] Pager #: [phone]  ****************************    Patient is a 88y old  Male who presents with a chief complaint of fall (15 Mar 2019 06:31)     INTERVAL HPI/OVERNIGHT EVENTS: attempted to get patient off BIPAP, however became very hypoxic (80s) and patient placed back on BIPAP. pt awake this AM, tracking with eyes, bipap on.       MEDICATIONS  (STANDING):  ALBUTerol/ipratropium for Nebulization 3 milliLiter(s) Nebulizer every 4 hours  azithromycin  IVPB 500 milliGRAM(s) IV Intermittent every 24 hours  azithromycin  IVPB      calcium acetate 667 milliGRAM(s) Oral three times a day with meals  dextrose 5%. 1000 milliLiter(s) (50 mL/Hr) IV Continuous <Continuous>  dextrose 50% Injectable 12.5 Gram(s) IV Push once  dextrose 50% Injectable 25 Gram(s) IV Push once  dextrose 50% Injectable 25 Gram(s) IV Push once  heparin  Injectable 5000 Unit(s) SubCutaneous every 8 hours  insulin lispro (HumaLOG) corrective regimen sliding scale   SubCutaneous every 6 hours  piperacillin/tazobactam IVPB. 3.375 Gram(s) IV Intermittent every 12 hours  sodium chloride 0.9%. 1000 milliLiter(s) (100 mL/Hr) IV Continuous <Continuous>    MEDICATIONS  (PRN):  dextrose 40% Gel 15 Gram(s) Oral once PRN Blood Glucose LESS THAN 70 milliGRAM(s)/deciliter  glucagon  Injectable 1 milliGRAM(s) IntraMuscular once PRN Glucose LESS THAN 70 milligrams/deciliter    Allergies:  No Known Allergies    Intolerances:    VITAL SIGNS:  T(C): 37.1 (03-15-19 @ 05:47), Max: 37.6 (03-14-19 @ 09:47)  HR: 106 (03-15-19 @ 05:47) (78 - 120)  BP: 139/74 (03-15-19 @ 05:47) (93/67 - 158/81)  RR: 25 (03-15-19 @ 05:47) (18 - 25)  SpO2: 97% (03-15-19 @ 05:47) (93% - 100%)    PHYSICAL EXAM:  General: mild distress BIPAP in place  Chest: B/L Rhonchi  Heart: Regular rate and rhythm; no murmurs, rubs, or gallops  Abd: +BS, soft, nontender, nondistended  Nervous System: AAOX3  Psych: Appropriate affect and mood  Ext:  no LE edema; TTP of L leg and hip     LABS:                        11.0   7.75  )-----------( 79       ( 14 Mar 2019 21:41 )             34.6     14 Mar 2019 21:41    144    |  122    |  21     ----------------------------<  162    3.1     |  13     |  1.54     Ca    5.9        14 Mar 2019 21:41  Phos  0.8       14 Mar 2019 21:41  Mg     1.2       14 Mar 2019 21:41      CAPILLARY BLOOD GLUCOSE      POCT Blood Glucose.: 194 mg/dL (15 Mar 2019 05:54)  POCT Blood Glucose.: 137 mg/dL (14 Mar 2019 23:58)  POCT Blood Glucose.: 301 mg/dL (14 Mar 2019 18:44)  POCT Blood Glucose.: 346 mg/dL (14 Mar 2019 13:51)  POCT Blood Glucose.: 381 mg/dL (14 Mar 2019 11:17)  POCT Blood Glucose.: 394 mg/dL (14 Mar 2019 07:58)    BLOOD CULTURE  03-14 @ 02:15 --    NO ORGANISMS ISOLATED  NO ORGANISMS ISOLATED AT 24 HOURS  --    RADIOLOGY & ADDITIONAL TESTS:    Imaging Personally Reviewed:  [ ] YES     Consultant(s) Notes Reviewed:      Care Discussed with Consultants/Other Providers:

## 2019-03-16 LAB
ANION GAP SERPL CALC-SCNC: 17 MMO/L — HIGH (ref 7–14)
APPEARANCE UR: SIGNIFICANT CHANGE UP
BILIRUB UR-MCNC: NEGATIVE — SIGNIFICANT CHANGE UP
BLOOD UR QL VISUAL: HIGH
BUN SERPL-MCNC: 42 MG/DL — HIGH (ref 7–23)
CALCIUM SERPL-MCNC: 8.2 MG/DL — LOW (ref 8.4–10.5)
CHLORIDE SERPL-SCNC: 108 MMOL/L — HIGH (ref 98–107)
CK SERPL-CCNC: 5087 U/L — HIGH (ref 30–200)
CO2 SERPL-SCNC: 16 MMOL/L — LOW (ref 22–31)
COLOR SPEC: YELLOW — SIGNIFICANT CHANGE UP
CREAT SERPL-MCNC: 2.33 MG/DL — HIGH (ref 0.5–1.3)
EPI CELLS # UR: SIGNIFICANT CHANGE UP
GLUCOSE SERPL-MCNC: 293 MG/DL — HIGH (ref 70–99)
GLUCOSE UR-MCNC: NEGATIVE — SIGNIFICANT CHANGE UP
HCT VFR BLD CALC: 39.3 % — SIGNIFICANT CHANGE UP (ref 39–50)
HGB BLD-MCNC: 13.2 G/DL — SIGNIFICANT CHANGE UP (ref 13–17)
HYALINE CASTS # UR AUTO: SIGNIFICANT CHANGE UP
KETONES UR-MCNC: NEGATIVE — SIGNIFICANT CHANGE UP
L PNEUMO AG UR QL: NEGATIVE — SIGNIFICANT CHANGE UP
LEUKOCYTE ESTERASE UR-ACNC: SIGNIFICANT CHANGE UP
MAGNESIUM SERPL-MCNC: 2.3 MG/DL — SIGNIFICANT CHANGE UP (ref 1.6–2.6)
MCHC RBC-ENTMCNC: 30.7 PG — SIGNIFICANT CHANGE UP (ref 27–34)
MCHC RBC-ENTMCNC: 33.6 % — SIGNIFICANT CHANGE UP (ref 32–36)
MCV RBC AUTO: 91.4 FL — SIGNIFICANT CHANGE UP (ref 80–100)
NITRITE UR-MCNC: NEGATIVE — SIGNIFICANT CHANGE UP
NRBC # FLD: 0 K/UL — LOW (ref 25–125)
PH UR: 6 — SIGNIFICANT CHANGE UP (ref 5–8)
PHOSPHATE SERPL-MCNC: 2.7 MG/DL — SIGNIFICANT CHANGE UP (ref 2.5–4.5)
PLATELET # BLD AUTO: 114 K/UL — LOW (ref 150–400)
PMV BLD: 10.9 FL — SIGNIFICANT CHANGE UP (ref 7–13)
POTASSIUM SERPL-MCNC: 5 MMOL/L — SIGNIFICANT CHANGE UP (ref 3.5–5.3)
POTASSIUM SERPL-SCNC: 5 MMOL/L — SIGNIFICANT CHANGE UP (ref 3.5–5.3)
PROT UR-MCNC: 100 — HIGH
RBC # BLD: 4.3 M/UL — SIGNIFICANT CHANGE UP (ref 4.2–5.8)
RBC # FLD: 13.7 % — SIGNIFICANT CHANGE UP (ref 10.3–14.5)
RBC CASTS # UR COMP ASSIST: >50 — HIGH (ref 0–?)
SODIUM SERPL-SCNC: 141 MMOL/L — SIGNIFICANT CHANGE UP (ref 135–145)
SP GR SPEC: 1.02 — SIGNIFICANT CHANGE UP (ref 1–1.04)
UROBILINOGEN FLD QL: NORMAL — SIGNIFICANT CHANGE UP
VANCOMYCIN FLD-MCNC: 12.3 UG/ML — SIGNIFICANT CHANGE UP
WBC # BLD: 7.89 K/UL — SIGNIFICANT CHANGE UP (ref 3.8–10.5)
WBC # FLD AUTO: 7.89 K/UL — SIGNIFICANT CHANGE UP (ref 3.8–10.5)
WBC UR QL: SIGNIFICANT CHANGE UP (ref 0–?)

## 2019-03-16 PROCEDURE — 71045 X-RAY EXAM CHEST 1 VIEW: CPT | Mod: 26

## 2019-03-16 PROCEDURE — 99233 SBSQ HOSP IP/OBS HIGH 50: CPT | Mod: GC

## 2019-03-16 PROCEDURE — 93010 ELECTROCARDIOGRAM REPORT: CPT

## 2019-03-16 RX ORDER — INSULIN LISPRO 100/ML
4 VIAL (ML) SUBCUTANEOUS
Qty: 0 | Refills: 0 | Status: DISCONTINUED | OUTPATIENT
Start: 2019-03-16 | End: 2019-03-22

## 2019-03-16 RX ORDER — FUROSEMIDE 40 MG
40 TABLET ORAL ONCE
Qty: 0 | Refills: 0 | Status: COMPLETED | OUTPATIENT
Start: 2019-03-16 | End: 2019-03-16

## 2019-03-16 RX ORDER — QUETIAPINE FUMARATE 200 MG/1
25 TABLET, FILM COATED ORAL EVERY 6 HOURS
Qty: 0 | Refills: 0 | Status: DISCONTINUED | OUTPATIENT
Start: 2019-03-16 | End: 2019-03-29

## 2019-03-16 RX ORDER — HYDROMORPHONE HYDROCHLORIDE 2 MG/ML
0.25 INJECTION INTRAMUSCULAR; INTRAVENOUS; SUBCUTANEOUS ONCE
Qty: 0 | Refills: 0 | Status: DISCONTINUED | OUTPATIENT
Start: 2019-03-16 | End: 2019-03-16

## 2019-03-16 RX ORDER — SODIUM CHLORIDE 9 MG/ML
5 INJECTION INTRAMUSCULAR; INTRAVENOUS; SUBCUTANEOUS
Qty: 0 | Refills: 0 | Status: DISCONTINUED | OUTPATIENT
Start: 2019-03-16 | End: 2019-03-17

## 2019-03-16 RX ORDER — INSULIN GLARGINE 100 [IU]/ML
15 INJECTION, SOLUTION SUBCUTANEOUS AT BEDTIME
Qty: 0 | Refills: 0 | Status: DISCONTINUED | OUTPATIENT
Start: 2019-03-16 | End: 2019-03-22

## 2019-03-16 RX ORDER — ACETAMINOPHEN 500 MG
650 TABLET ORAL EVERY 6 HOURS
Qty: 0 | Refills: 0 | Status: DISCONTINUED | OUTPATIENT
Start: 2019-03-16 | End: 2019-03-29

## 2019-03-16 RX ORDER — VANCOMYCIN HCL 1 G
500 VIAL (EA) INTRAVENOUS ONCE
Qty: 0 | Refills: 0 | Status: COMPLETED | OUTPATIENT
Start: 2019-03-16 | End: 2019-03-16

## 2019-03-16 RX ADMIN — Medication 4 UNIT(S): at 11:40

## 2019-03-16 RX ADMIN — PIPERACILLIN AND TAZOBACTAM 25 GRAM(S): 4; .5 INJECTION, POWDER, LYOPHILIZED, FOR SOLUTION INTRAVENOUS at 00:45

## 2019-03-16 RX ADMIN — Medication 3 MILLILITER(S): at 21:36

## 2019-03-16 RX ADMIN — Medication 3 MILLILITER(S): at 09:07

## 2019-03-16 RX ADMIN — HYDROMORPHONE HYDROCHLORIDE 0.25 MILLIGRAM(S): 2 INJECTION INTRAMUSCULAR; INTRAVENOUS; SUBCUTANEOUS at 12:16

## 2019-03-16 RX ADMIN — SODIUM CHLORIDE 5 MILLILITER(S): 9 INJECTION INTRAMUSCULAR; INTRAVENOUS; SUBCUTANEOUS at 21:20

## 2019-03-16 RX ADMIN — PIPERACILLIN AND TAZOBACTAM 25 GRAM(S): 4; .5 INJECTION, POWDER, LYOPHILIZED, FOR SOLUTION INTRAVENOUS at 23:21

## 2019-03-16 RX ADMIN — HEPARIN SODIUM 5000 UNIT(S): 5000 INJECTION INTRAVENOUS; SUBCUTANEOUS at 04:54

## 2019-03-16 RX ADMIN — SODIUM CHLORIDE 100 MILLILITER(S): 9 INJECTION INTRAMUSCULAR; INTRAVENOUS; SUBCUTANEOUS at 03:23

## 2019-03-16 RX ADMIN — Medication 8: at 07:50

## 2019-03-16 RX ADMIN — INSULIN GLARGINE 15 UNIT(S): 100 INJECTION, SOLUTION SUBCUTANEOUS at 23:21

## 2019-03-16 RX ADMIN — HEPARIN SODIUM 5000 UNIT(S): 5000 INJECTION INTRAVENOUS; SUBCUTANEOUS at 16:03

## 2019-03-16 RX ADMIN — Medication 4 UNIT(S): at 16:50

## 2019-03-16 RX ADMIN — Medication 40 MILLIGRAM(S): at 16:03

## 2019-03-16 RX ADMIN — HEPARIN SODIUM 5000 UNIT(S): 5000 INJECTION INTRAVENOUS; SUBCUTANEOUS at 21:43

## 2019-03-16 RX ADMIN — QUETIAPINE FUMARATE 25 MILLIGRAM(S): 200 TABLET, FILM COATED ORAL at 21:41

## 2019-03-16 RX ADMIN — PIPERACILLIN AND TAZOBACTAM 25 GRAM(S): 4; .5 INJECTION, POWDER, LYOPHILIZED, FOR SOLUTION INTRAVENOUS at 12:30

## 2019-03-16 RX ADMIN — Medication 3 MILLILITER(S): at 00:05

## 2019-03-16 RX ADMIN — Medication 100 MILLIGRAM(S): at 18:34

## 2019-03-16 RX ADMIN — HYDROMORPHONE HYDROCHLORIDE 0.25 MILLIGRAM(S): 2 INJECTION INTRAMUSCULAR; INTRAVENOUS; SUBCUTANEOUS at 11:39

## 2019-03-16 RX ADMIN — Medication 3 MILLILITER(S): at 12:43

## 2019-03-16 RX ADMIN — Medication 3 MILLILITER(S): at 04:41

## 2019-03-16 RX ADMIN — Medication 2: at 11:40

## 2019-03-16 RX ADMIN — Medication 3 MILLILITER(S): at 17:00

## 2019-03-16 NOTE — PROGRESS NOTE ADULT - ATTENDING COMMENTS
Patient was seen and examined, case d/w house staff.     88M p/w fall, fount to have hip fracture, a/w acute respiratory failure w/ hypercapnia and hypoxia in the setting of multifocal pneumonia, s/p BIPAP, also developed AHVEN in the setting of likely contrast nephropathy and rhabdomyolysis.   Pt is restless with rhonchi on lung exam.    Assessment/plan:  # acute respiratory failure d/t pneumonia: c/w abx (vanco/zosyn), repeat CXR, monitor O2 sats, c/w abx and duoneb, chest PT  dose 500 mg vanco today  # Acute encephalopathy likely delirium in setting of sepsis and hip fx: low dose seroquel 25 mg q6h prn, check QTc on EKG, if worsens then psych consult  # HAVEN: d/t rhabdo and contrast nephropathy, pt appears to have pulmonary congestion to my view of CXR, hold IVF  iv lasix 40 mg x1, avoid fluid overload  # hip fx: plan for hip surgery next wk when medically optimized, f/u ortho, check TTE Patient was seen and examined, case d/w house staff.     88M p/w fall, fount to have hip fracture, a/w acute respiratory failure w/ hypercapnia and hypoxia in the setting of multifocal pneumonia, s/p BIPAP, also developed HAVEN in the setting of likely contrast nephropathy and rhabdomyolysis.   Pt is restless with rhonchi on lung exam.    Assessment/plan:  # acute respiratory failure d/t pneumonia: c/w abx (vanco/zosyn), repeat CXR, monitor O2 sats, c/w abx and duoneb, chest PT  # Acute encephalopathy likely delirium in setting of sepsis and hip fx: low dose seroquel 25 mg q6h prn, check QTc on EKG, if worsens then psych consult  # HAVEN: d/t rhabdo and contrast nephropathy, pt appears to have pulmonary congestion to my view of CXR, hold IVF  iv lasix 40 mg x1, avoid fluid overload  trend cpk  # hip fx: plan for hip surgery next wk when medically optimized, f/u ortho, check TTE Patient was seen and examined, case d/w house staff.     88M p/w fall, fount to have hip fracture, a/w acute respiratory failure w/ hypercapnia and hypoxia in the setting of multifocal pneumonia, s/p BIPAP, also developed HAVEN in the setting of likely contrast nephropathy and rhabdomyolysis.   Pt is restless with rhonchi on lung exam.    Assessment/plan:  # acute respiratory failure d/t pneumonia: c/w abx (vanco/zosyn), repeat CXR, monitor O2 sats, c/w abx and duoneb, chest PT  # Acute encephalopathy likely delirium in setting of sepsis and hip fx: low dose seroquel 25 mg q6h prn, check QTc on EKG, if worsens then psych consult  # HAVEN: d/t rhabdo and contrast nephropathy, pt appears to have pulmonary congestion to my view of CXR, hold IVF  iv lasix 40 mg x1, avoid fluid overload  trend cpk  # DM-2: uncontrolled, increase lantus to 15 U hs and  humalog 4 U ac, monitor FS  #L hip fx: plan for hip surgery next wk when medically optimized, f/u ortho, check TTE

## 2019-03-16 NOTE — PROGRESS NOTE ADULT - SUBJECTIVE AND OBJECTIVE BOX
****************************  Kanika Schumacher, PGY-1  [facility] Pager #: [phone]  [facility] Pager #: [phone]  ****************************    Patient is a 88y old  Male who presents with a chief complaint of fall (15 Mar 2019 07:37)     INTERVAL HPI/OVERNIGHT EVENTS: patient tolerate BIPAP -> venti mask      MEDICATIONS  (STANDING):  ALBUTerol/ipratropium for Nebulization 3 milliLiter(s) Nebulizer every 4 hours  azithromycin  IVPB 500 milliGRAM(s) IV Intermittent every 24 hours  azithromycin  IVPB      dextrose 5%. 1000 milliLiter(s) (50 mL/Hr) IV Continuous <Continuous>  dextrose 50% Injectable 12.5 Gram(s) IV Push once  dextrose 50% Injectable 25 Gram(s) IV Push once  dextrose 50% Injectable 25 Gram(s) IV Push once  heparin  Injectable 5000 Unit(s) SubCutaneous every 8 hours  insulin glargine Injectable (LANTUS) 10 Unit(s) SubCutaneous at bedtime  insulin lispro (HumaLOG) corrective regimen sliding scale   SubCutaneous three times a day before meals  insulin lispro (HumaLOG) corrective regimen sliding scale   SubCutaneous at bedtime  piperacillin/tazobactam IVPB. 3.375 Gram(s) IV Intermittent every 12 hours  potassium phosphate IVPB 15 milliMole(s) IV Intermittent once  sodium chloride 0.9%. 1000 milliLiter(s) (100 mL/Hr) IV Continuous <Continuous>    MEDICATIONS  (PRN):  acetaminophen   Tablet .. 650 milliGRAM(s) Oral every 6 hours PRN Mild Pain (1 - 3), Moderate Pain (4 - 6), Severe Pain (7 - 10)  dextrose 40% Gel 15 Gram(s) Oral once PRN Blood Glucose LESS THAN 70 milliGRAM(s)/deciliter  glucagon  Injectable 1 milliGRAM(s) IntraMuscular once PRN Glucose LESS THAN 70 milligrams/deciliter    Allergies:  No Known Allergies    Intolerances:      REVIEW OF SYSTEMS:  Constitutional: Denies fever, weight loss, fatigue  Resp: Denies SOB, cough, hemoptysis  CV: Denies chest pain, palpitations, dizziness  GI: Denies abdominal pain, N/V/D/C, melena, hematochezia  : Denies dysuria, increased frequency, hematuria  Neuro: Denies HA, weakness, numbness  Skin: Denies rashes, lesions  Lymph Nodes: Denies enlarged glands  MSK: Denies joint pain, swelling    VITAL SIGNS:  T(C): 36.9 (03-16-19 @ 05:20), Max: 37.2 (03-15-19 @ 21:24)  HR: 110 (03-16-19 @ 05:20) (98 - 114)  BP: 121/64 (03-16-19 @ 05:20) (121/64 - 134/72)  RR: 20 (03-16-19 @ 05:20) (20 - 20)  SpO2: 95% (03-16-19 @ 05:20) (87% - 100%)    PHYSICAL EXAM:  General: NAD, well-groomed, well-developed  Eyes: Conjunctiva and sclera clear  ENMT: Moist mucous membranes  Neck: Supple  Chest: Clear to auscultation bilaterally; no rales, rhonchi, or wheezing  Heart: Regular rate and rhythm; no murmurs, rubs, or gallops  Abd: +BS, soft, nontender, nondistended  Nervous System: AAOX3  Psych: Appropriate affect and mood  Ext:  no LE edema    LABS:      Ca    8.5        15 Mar 2019 06:08      CAPILLARY BLOOD GLUCOSE      POCT Blood Glucose.: 324 mg/dL (16 Mar 2019 07:27)  POCT Blood Glucose.: 258 mg/dL (15 Mar 2019 21:46)  POCT Blood Glucose.: 204 mg/dL (15 Mar 2019 13:00)    BLOOD CULTURE  03-14 @ 02:15 --    NO ORGANISMS ISOLATED  NO ORGANISMS ISOLATED AT 48 HRS.  --    RADIOLOGY & ADDITIONAL TESTS:    Imaging Personally Reviewed:  [ ] YES     Consultant(s) Notes Reviewed:      Care Discussed with Consultants/Other Providers: ****************************  Kanika Schumacher, PGY-1  [facility] Pager #: [phone]  [facility] Pager #: [phone]  ****************************    Patient is a 88y old  Male who presents with a chief complaint of fall (15 Mar 2019 07:37)     INTERVAL HPI/OVERNIGHT EVENTS: patient tolerate BIPAP -> venti mask -> nasal cannula.patient seen with family at bedside. patient eating but still coughing. patient c/o pain in L leg, denies fever chills       MEDICATIONS  (STANDING):  ALBUTerol/ipratropium for Nebulization 3 milliLiter(s) Nebulizer every 4 hours  azithromycin  IVPB 500 milliGRAM(s) IV Intermittent every 24 hours  azithromycin  IVPB      dextrose 5%. 1000 milliLiter(s) (50 mL/Hr) IV Continuous <Continuous>  dextrose 50% Injectable 12.5 Gram(s) IV Push once  dextrose 50% Injectable 25 Gram(s) IV Push once  dextrose 50% Injectable 25 Gram(s) IV Push once  heparin  Injectable 5000 Unit(s) SubCutaneous every 8 hours  insulin glargine Injectable (LANTUS) 10 Unit(s) SubCutaneous at bedtime  insulin lispro (HumaLOG) corrective regimen sliding scale   SubCutaneous three times a day before meals  insulin lispro (HumaLOG) corrective regimen sliding scale   SubCutaneous at bedtime  piperacillin/tazobactam IVPB. 3.375 Gram(s) IV Intermittent every 12 hours  potassium phosphate IVPB 15 milliMole(s) IV Intermittent once  sodium chloride 0.9%. 1000 milliLiter(s) (100 mL/Hr) IV Continuous <Continuous>    MEDICATIONS  (PRN):  acetaminophen   Tablet .. 650 milliGRAM(s) Oral every 6 hours PRN Mild Pain (1 - 3), Moderate Pain (4 - 6), Severe Pain (7 - 10)  dextrose 40% Gel 15 Gram(s) Oral once PRN Blood Glucose LESS THAN 70 milliGRAM(s)/deciliter  glucagon  Injectable 1 milliGRAM(s) IntraMuscular once PRN Glucose LESS THAN 70 milligrams/deciliter    Allergies:  No Known Allergies    Intolerances:      REVIEW OF SYSTEMS:  Constitutional: Denies fever, weight loss, fatigue  Resp: Denies SOB, cough, hemoptysis  CV: Denies chest pain, palpitations, dizziness  GI: Denies abdominal pain, N/V/D/C, melena, hematochezia  : Denies dysuria, increased frequency, hematuria  Neuro: Denies HA, weakness, numbness  Skin: Denies rashes, lesions  Lymph Nodes: Denies enlarged glands  MSK: Denies joint pain, swelling    VITAL SIGNS:  T(C): 36.9 (03-16-19 @ 05:20), Max: 37.2 (03-15-19 @ 21:24)  HR: 110 (03-16-19 @ 05:20) (98 - 114)  BP: 121/64 (03-16-19 @ 05:20) (121/64 - 134/72)  RR: 20 (03-16-19 @ 05:20) (20 - 20)  SpO2: 95% (03-16-19 @ 05:20) (87% - 100%)    PHYSICAL EXAM:  General: NAD, well-groomed, well-developed  Eyes: Conjunctiva and sclera clear  ENMT: Moist mucous membranes  Neck: Supple  Chest: Clear to auscultation bilaterally; no rales, rhonchi, or wheezing  Heart: Regular rate and rhythm; no murmurs, rubs, or gallops  Abd: +BS, soft, nontender, nondistended  Nervous System: AAOX3  Psych: Appropriate affect and mood  Ext:  no LE edema    LABS:      Ca    8.5        15 Mar 2019 06:08      CAPILLARY BLOOD GLUCOSE      POCT Blood Glucose.: 324 mg/dL (16 Mar 2019 07:27)  POCT Blood Glucose.: 258 mg/dL (15 Mar 2019 21:46)  POCT Blood Glucose.: 204 mg/dL (15 Mar 2019 13:00)    BLOOD CULTURE  03-14 @ 02:15 --    NO ORGANISMS ISOLATED  NO ORGANISMS ISOLATED AT 48 HRS.  --    RADIOLOGY & ADDITIONAL TESTS:    Imaging Personally Reviewed:  [ ] YES     Consultant(s) Notes Reviewed:      Care Discussed with Consultants/Other Providers:

## 2019-03-16 NOTE — PROGRESS NOTE ADULT - ASSESSMENT
Assessment and Plan:   Pt is a 87yo Bosnian-speaking M with PMHx of DM, hard of hearing presenting for presented with AMS and respiratory distress following an unwitnessed fall, found to have L femoral neck fracture, sepsis likely 2/2 multifocal PNA, and acute Hypoxic and hypercarbic respiratory failure; improved on BIPAP and abx    #Acute Hypoxic and Hypercarbic Respiratory Failure   - respiratory distress, coarse rhonchi, CT showing multifocal PNA   - vanc by level given new HAVEN, zosyn q12 due to Cr Cl  - on venti mask, titrating down as tolerated  -, mentation improving; reduced settings given hypocarbia on recent ABG, likely compensating for metabolic acidosis. hypercarbia has resolved.    - standing duonebs   - Chest PT as pt can tolerate    #Sepsis  - multifocal PNA, lactic acidosis  - mixed respiratory and metabolic acidosis with pH 7.13,elevated lactate to 7  - with ~3L+ fluid and BIPAP, pH improving to 7.4; lactate 4.6  - NS @ 100, lactate trending down   - s/p NS x2.5L, LRx1  - blood cx pending  - UA, RVP negative     #Hyperglycemia  - lantus given yesterday (10u)  - FS elevated this AM >300  - high ISS, fingersticks q6 while NPO   - no ketones, acidosis improving with IVF    #Elevated troponin  - elevated on admission, increasing 37 at 2AM -> 93, now trending down  - no chest pain, likely in setting of demand ischemia    #HAVEN  - initial Cr .86, now doubled  - likely prerenal, possible contrast induced nephropathy from CT   - making urine, strict I&Os given hypotension  - urine lytes   - renally dose meds     # Confusion  - improving with BIPAP, 2/2 from hypercarbia  - CTH negative for acute pathology     #L femoral neck fracture  - ortho appreciate recs: if medically optimized, will go to OR next week   - pending medical stabilization  - tylenol for pain control     #prophylactic measure  - DVT - HSQ (consider lovenox given fracture, however HAVEN)  - Advanced Directive: son at bedside, extensive conversation regarding current critical medical illness, son understood, DNR/DNI, MOLST in chart

## 2019-03-17 LAB
ANION GAP SERPL CALC-SCNC: 16 MMO/L — HIGH (ref 7–14)
BUN SERPL-MCNC: 51 MG/DL — HIGH (ref 7–23)
CALCIUM SERPL-MCNC: 8.2 MG/DL — LOW (ref 8.4–10.5)
CHLORIDE SERPL-SCNC: 106 MMOL/L — SIGNIFICANT CHANGE UP (ref 98–107)
CK SERPL-CCNC: 2088 U/L — HIGH (ref 30–200)
CO2 SERPL-SCNC: 18 MMOL/L — LOW (ref 22–31)
CREAT SERPL-MCNC: 2.38 MG/DL — HIGH (ref 0.5–1.3)
GLUCOSE SERPL-MCNC: 191 MG/DL — HIGH (ref 70–99)
HCT VFR BLD CALC: 36.9 % — LOW (ref 39–50)
HGB BLD-MCNC: 12.1 G/DL — LOW (ref 13–17)
MAGNESIUM SERPL-MCNC: 2.4 MG/DL — SIGNIFICANT CHANGE UP (ref 1.6–2.6)
MCHC RBC-ENTMCNC: 30.6 PG — SIGNIFICANT CHANGE UP (ref 27–34)
MCHC RBC-ENTMCNC: 32.8 % — SIGNIFICANT CHANGE UP (ref 32–36)
MCV RBC AUTO: 93.4 FL — SIGNIFICANT CHANGE UP (ref 80–100)
NRBC # FLD: 0 K/UL — LOW (ref 25–125)
PLATELET # BLD AUTO: 116 K/UL — LOW (ref 150–400)
PMV BLD: 10.7 FL — SIGNIFICANT CHANGE UP (ref 7–13)
POTASSIUM SERPL-MCNC: 4.3 MMOL/L — SIGNIFICANT CHANGE UP (ref 3.5–5.3)
POTASSIUM SERPL-SCNC: 4.3 MMOL/L — SIGNIFICANT CHANGE UP (ref 3.5–5.3)
RBC # BLD: 3.95 M/UL — LOW (ref 4.2–5.8)
RBC # FLD: 13.7 % — SIGNIFICANT CHANGE UP (ref 10.3–14.5)
SODIUM SERPL-SCNC: 140 MMOL/L — SIGNIFICANT CHANGE UP (ref 135–145)
VANCOMYCIN FLD-MCNC: 13.7 UG/ML — SIGNIFICANT CHANGE UP
WBC # BLD: 5.7 K/UL — SIGNIFICANT CHANGE UP (ref 3.8–10.5)
WBC # FLD AUTO: 5.7 K/UL — SIGNIFICANT CHANGE UP (ref 3.8–10.5)

## 2019-03-17 PROCEDURE — 99233 SBSQ HOSP IP/OBS HIGH 50: CPT | Mod: GC

## 2019-03-17 RX ORDER — VANCOMYCIN HCL 1 G
500 VIAL (EA) INTRAVENOUS ONCE
Qty: 0 | Refills: 0 | Status: COMPLETED | OUTPATIENT
Start: 2019-03-17 | End: 2019-03-17

## 2019-03-17 RX ORDER — ACETAMINOPHEN 500 MG
650 TABLET ORAL EVERY 6 HOURS
Qty: 0 | Refills: 0 | Status: DISCONTINUED | OUTPATIENT
Start: 2019-03-17 | End: 2019-03-29

## 2019-03-17 RX ORDER — SODIUM CHLORIDE 9 MG/ML
4 INJECTION INTRAMUSCULAR; INTRAVENOUS; SUBCUTANEOUS EVERY 6 HOURS
Qty: 0 | Refills: 0 | Status: DISCONTINUED | OUTPATIENT
Start: 2019-03-17 | End: 2019-03-29

## 2019-03-17 RX ORDER — SODIUM CHLORIDE 9 MG/ML
5 INJECTION INTRAMUSCULAR; INTRAVENOUS; SUBCUTANEOUS EVERY 6 HOURS
Qty: 0 | Refills: 0 | Status: DISCONTINUED | OUTPATIENT
Start: 2019-03-17 | End: 2019-03-17

## 2019-03-17 RX ORDER — FUROSEMIDE 40 MG
40 TABLET ORAL ONCE
Qty: 0 | Refills: 0 | Status: COMPLETED | OUTPATIENT
Start: 2019-03-17 | End: 2019-03-17

## 2019-03-17 RX ORDER — IPRATROPIUM/ALBUTEROL SULFATE 18-103MCG
3 AEROSOL WITH ADAPTER (GRAM) INHALATION EVERY 6 HOURS
Qty: 0 | Refills: 0 | Status: DISCONTINUED | OUTPATIENT
Start: 2019-03-17 | End: 2019-03-23

## 2019-03-17 RX ADMIN — Medication 3 MILLILITER(S): at 21:11

## 2019-03-17 RX ADMIN — Medication 3 MILLILITER(S): at 17:02

## 2019-03-17 RX ADMIN — Medication 40 MILLIGRAM(S): at 11:44

## 2019-03-17 RX ADMIN — HEPARIN SODIUM 5000 UNIT(S): 5000 INJECTION INTRAVENOUS; SUBCUTANEOUS at 06:08

## 2019-03-17 RX ADMIN — HEPARIN SODIUM 5000 UNIT(S): 5000 INJECTION INTRAVENOUS; SUBCUTANEOUS at 21:53

## 2019-03-17 RX ADMIN — Medication 4 UNIT(S): at 11:45

## 2019-03-17 RX ADMIN — Medication 3 MILLILITER(S): at 01:59

## 2019-03-17 RX ADMIN — HEPARIN SODIUM 5000 UNIT(S): 5000 INJECTION INTRAVENOUS; SUBCUTANEOUS at 13:47

## 2019-03-17 RX ADMIN — Medication 100 MILLIGRAM(S): at 11:44

## 2019-03-17 RX ADMIN — Medication 2: at 11:45

## 2019-03-17 RX ADMIN — Medication 4 UNIT(S): at 11:18

## 2019-03-17 RX ADMIN — Medication 3 MILLILITER(S): at 11:10

## 2019-03-17 RX ADMIN — Medication 2: at 08:16

## 2019-03-17 RX ADMIN — SODIUM CHLORIDE 4 MILLILITER(S): 9 INJECTION INTRAMUSCULAR; INTRAVENOUS; SUBCUTANEOUS at 21:11

## 2019-03-17 RX ADMIN — INSULIN GLARGINE 15 UNIT(S): 100 INJECTION, SOLUTION SUBCUTANEOUS at 22:00

## 2019-03-17 RX ADMIN — Medication 3 MILLILITER(S): at 04:11

## 2019-03-17 RX ADMIN — Medication 4 UNIT(S): at 17:13

## 2019-03-17 RX ADMIN — SODIUM CHLORIDE 4 MILLILITER(S): 9 INJECTION INTRAMUSCULAR; INTRAVENOUS; SUBCUTANEOUS at 11:11

## 2019-03-17 RX ADMIN — SODIUM CHLORIDE 4 MILLILITER(S): 9 INJECTION INTRAMUSCULAR; INTRAVENOUS; SUBCUTANEOUS at 17:03

## 2019-03-17 RX ADMIN — PIPERACILLIN AND TAZOBACTAM 25 GRAM(S): 4; .5 INJECTION, POWDER, LYOPHILIZED, FOR SOLUTION INTRAVENOUS at 13:47

## 2019-03-17 NOTE — PROGRESS NOTE ADULT - ATTENDING COMMENTS
I agree with the above note on this patient. All pertinent films have been reviewed. Please refer to clinical documentation of the history, physical examinations, data summary, and both assessment and plan as documented above and with which I agree.    still not medically cleared, will reassess in the AM if OR candidate, otherwise may need to further delay surgery    Abdiel Shabazz MD  Attending Orthopedic Surgeon

## 2019-03-17 NOTE — PROGRESS NOTE ADULT - SUBJECTIVE AND OBJECTIVE BOX
Orthopedic Surgery Progress Note  Continues to be on bipap in setting of respiratory failure 2/2 multifocal PNA. Subjective improvement overnight by family members.     O:  ICU Vital Signs Last 24 Hrs  T(C): 37.9 (17 Mar 2019 06:09), Max: 38.3 (16 Mar 2019 15:32)  T(F): 100.3 (17 Mar 2019 06:09), Max: 101 (16 Mar 2019 15:32)  HR: 91 (17 Mar 2019 06:09) (84 - 116)  BP: 145/84 (17 Mar 2019 06:09) (107/69 - 145/84)  BP(mean): --  ABP: --  ABP(mean): --  RR: 19 (17 Mar 2019 06:09) (19 - 20)  SpO2: 99% (17 Mar 2019 06:09) (95% - 100%)      Gen: NAD  LLE  skin intact  shortened/ER  patient non-compliant with exam  WWP distally    Labs:                        11.0   7.75  )-----------( 79       ( 14 Mar 2019 21:41 )             34.6                         16.0   13.34 )-----------( 119      ( 14 Mar 2019 04:53 )             47.9     03-14    144  |  122<H>  |  21  ----------------------------<  162<H>  3.1<L>   |  13<L>  |  1.54<H>    PT/INR - ( 14 Mar 2019 01:45 )   PT: 15.5 SEC;   INR: 1.38        PTT - ( 14 Mar 2019 01:45 )  PTT:37.1 SEC    A/P 88y year old male s/p with L femoral neck fracture and multifocal PNA currently on BiPAP.   Pending optimization and clearance for surgery  Pain Control  DVT PPX per primary  NWB LLE  Continue abx and supportive care for PNA  Possible OR 3/18 for L hip hemiarthroplasty  DNR/DNI  Dispo planning

## 2019-03-17 NOTE — PROGRESS NOTE ADULT - ATTENDING COMMENTS
Patient was seen and examined, case d/w house staff.     88M p/w fall, fount to have hip fracture, a/w acute respiratory failure w/ hypercapnia and hypoxia in the setting of multifocal pneumonia, s/p BIPAP, also developed HAVEN in the setting of likely contrast nephropathy and rhabdomyolysis.   Pt is restless with rhonchi on lung exam.    Assessment/plan:  # acute respiratory failure d/t pneumonia and chf: c/w abx (vanco/zosyn), vanco level 13.7, give 500 mg vanco today  diuresis (iv lasix 40 mg x1 today)   c/w abx and duoneb, chest PT  # Acute encephalopathy likely delirium in setting of sepsis and hip fx: low dose seroquel 25 mg q6h prn, check QTc on EKG, if worsens then psych consult  # acute systolic chf: off IVF, iv lasix 40 mg x1 today, also got a dose yesterday  CXR pulmonary congestion/edema/cardiomegaly  # HAVEN: d/t rhabdo and contrast nephropathy  trend cpk (trending down)  # DM-2: uncontrolled, glycemic control better, c/w lantus to 15 U hs and  humalog 4 U ac, monitor FS  #L hip fx: plan for hip surgery next wk when medically optimized, f/u ortho, pending TTE

## 2019-03-17 NOTE — PROGRESS NOTE ADULT - ASSESSMENT
Assessment and Plan:   Pt is a 87yo Bosnian-speaking M with PMHx of DM, hard of hearing presenting for presented with AMS and respiratory distress following an unwitnessed fall, found to have L femoral neck fracture, sepsis likely 2/2 multifocal PNA, and acute Hypoxic and hypercarbic respiratory failure; improved on BIPAP and abx    #Acute Hypoxic and Hypercarbic Respiratory Failure   - respiratory distress, coarse rhonchi, CT showing multifocal PNA   - c/w vanc by level / zosyn  - c/w NC w/ bipap at night   - standing duonebs   - Chest PT as pt can tolerate  - congestion on CXR, s/p lasix 40 mg IV yesterday, will give additional 40 mg IV today    #Sepsis  2/2 PNA  blood cultures negative  c/w abx as above    #Hyperglycemia  c/w lantus, ISS     #HAVEN  - initial Cr .86, trending up, stable today after IV diuresis  - likely prerenal, possible contrast induced nephropathy from CT   - making urine, strict I&Os  - urine lytes   - renally dose meds     # Confusion  - improving with BIPAP, 2/2 from hypercarbia  - CTH negative for acute pathology     #L femoral neck fracture  - ortho appreciate recs: if medically optimized, will go to OR next week - d/w ortho not optimized for surgery tomorrow  - pending medical stabilization  - tylenol for pain control     #prophylactic measure  - DVT - HSQ (consider lovenox given fracture, however HAVEN)  - Advanced Directive: son at bedside, extensive conversation regarding current critical medical illness, son understood, DNR/DNI, MOLST in chart

## 2019-03-17 NOTE — PROGRESS NOTE ADULT - SUBJECTIVE AND OBJECTIVE BOX
Patient is a 88y old  Male who presents with a chief complaint of fall (17 Mar 2019 06:21)       INTERVAL HPI/OVERNIGHT EVENTS:  no events overnight.  Lethargic but arousable this am, wore bipap until 4:30 AM.      MEDICATIONS  (STANDING):  ALBUTerol/ipratropium for Nebulization 3 milliLiter(s) Nebulizer every 6 hours  dextrose 5%. 1000 milliLiter(s) (50 mL/Hr) IV Continuous <Continuous>  dextrose 50% Injectable 12.5 Gram(s) IV Push once  dextrose 50% Injectable 25 Gram(s) IV Push once  dextrose 50% Injectable 25 Gram(s) IV Push once  heparin  Injectable 5000 Unit(s) SubCutaneous every 8 hours  insulin glargine Injectable (LANTUS) 15 Unit(s) SubCutaneous at bedtime  insulin lispro (HumaLOG) corrective regimen sliding scale   SubCutaneous three times a day before meals  insulin lispro (HumaLOG) corrective regimen sliding scale   SubCutaneous at bedtime  insulin lispro Injectable (HumaLOG) 4 Unit(s) SubCutaneous three times a day before meals  piperacillin/tazobactam IVPB. 3.375 Gram(s) IV Intermittent every 12 hours  sodium chloride 3%  Inhalation 4 milliLiter(s) Inhalation every 6 hours    MEDICATIONS  (PRN):  acetaminophen   Tablet .. 650 milliGRAM(s) Oral every 6 hours PRN Mild Pain (1 - 3), Moderate Pain (4 - 6), Severe Pain (7 - 10)  acetaminophen   Tablet .. 650 milliGRAM(s) Oral every 6 hours PRN Temp greater or equal to 38C (100.4F)  acetaminophen  Suppository .. 650 milliGRAM(s) Rectal every 6 hours PRN Temp greater or equal to 38C (100.4F)  dextrose 40% Gel 15 Gram(s) Oral once PRN Blood Glucose LESS THAN 70 milliGRAM(s)/deciliter  glucagon  Injectable 1 milliGRAM(s) IntraMuscular once PRN Glucose LESS THAN 70 milligrams/deciliter  QUEtiapine 25 milliGRAM(s) Oral every 6 hours PRN agitation      Allergies    No Known Allergies    Intolerances      REVIEW OF SYSTEMS:  unable to assess, patient not answering questions.        Vital Signs Last 24 Hrs  T(C): 37.9 (17 Mar 2019 06:09), Max: 38.3 (16 Mar 2019 15:32)  T(F): 100.3 (17 Mar 2019 06:09), Max: 101 (16 Mar 2019 15:32)  HR: 95 (17 Mar 2019 11:13) (84 - 114)  BP: 145/84 (17 Mar 2019 06:09) (107/69 - 145/84)  BP(mean): --  RR: 19 (17 Mar 2019 06:09) (19 - 19)  SpO2: 95% (17 Mar 2019 11:11) (95% - 100%)    PHYSICAL EXAM:  GENERAL: NAD  HEAD:  Atraumatic, Normocephalic  EYES: EOMI, PERRLA, conjunctiva and sclera clear  ENMT: No tonsillar erythema, exudates, or enlargemen  NECK: Supple, No JVD  NERVOUS SYSTEM: unable to assess, not cooperative w/ neuro exam  CHEST/LUNG: +coarse ronchi bilaterally  HEART: Regular rate and rhythm; No murmurs, rubs, or gallops. No LE edema  ABDOMEN: Soft, Nontender, Nondistended; Bowel sounds present  EXTREMITIES:  2+ Peripheral Pulses, No clubbing, cyanosis  SKIN: No rashes or lesions    LABS:                        12.1   5.70  )-----------( 116      ( 17 Mar 2019 05:30 )             36.9     17 Mar 2019 05:30    140    |  106    |  51     ----------------------------<  191    4.3     |  18     |  2.38     Ca    8.2        17 Mar 2019 05:30  Mg     2.4       17 Mar 2019 05:30        CAPILLARY BLOOD GLUCOSE      POCT Blood Glucose.: 175 mg/dL (17 Mar 2019 11:35)  POCT Blood Glucose.: 184 mg/dL (17 Mar 2019 08:02)  POCT Blood Glucose.: 157 mg/dL (16 Mar 2019 22:31)  POCT Blood Glucose.: 100 mg/dL (16 Mar 2019 16:30)    BLOOD CULTURE  03-14 @ 02:15 --    NO ORGANISMS ISOLATED  NO ORGANISMS ISOLATED AT 72 HRS.  --    RADIOLOGY & ADDITIONAL TESTS:    Imaging Personally Reviewed:  [ ] YES     Consultant(s) Notes Reviewed:      Care Discussed with Consultants/Other Providers:

## 2019-03-18 ENCOUNTER — APPOINTMENT (OUTPATIENT)
Dept: ORTHOPEDIC SURGERY | Facility: HOSPITAL | Age: 84
End: 2019-03-18

## 2019-03-18 DIAGNOSIS — N17.9 ACUTE KIDNEY FAILURE, UNSPECIFIED: ICD-10-CM

## 2019-03-18 LAB
ANION GAP SERPL CALC-SCNC: 17 MMO/L — HIGH (ref 7–14)
APTT BLD: 28.4 SEC — SIGNIFICANT CHANGE UP (ref 27.5–36.3)
BASE EXCESS BLDV CALC-SCNC: -2.2 MMOL/L — SIGNIFICANT CHANGE UP
BLD GP AB SCN SERPL QL: NEGATIVE — SIGNIFICANT CHANGE UP
BLOOD GAS VENOUS - CREATININE: 2.41 MG/DL — HIGH (ref 0.5–1.3)
BUN SERPL-MCNC: 55 MG/DL — HIGH (ref 7–23)
CALCIUM SERPL-MCNC: 8.7 MG/DL — SIGNIFICANT CHANGE UP (ref 8.4–10.5)
CHLORIDE BLDV-SCNC: 117 MMOL/L — HIGH (ref 96–108)
CHLORIDE SERPL-SCNC: 110 MMOL/L — HIGH (ref 98–107)
CHLORIDE UR-SCNC: 100 MMOL/L — SIGNIFICANT CHANGE UP
CO2 SERPL-SCNC: 21 MMOL/L — LOW (ref 22–31)
CREAT ?TM UR-MCNC: 18.2 MG/DL — SIGNIFICANT CHANGE UP
CREAT SERPL-MCNC: 2.44 MG/DL — HIGH (ref 0.5–1.3)
GAS PNL BLDV: 144 MMOL/L — SIGNIFICANT CHANGE UP (ref 136–146)
GLUCOSE BLDC GLUCOMTR-MCNC: 152 MG/DL — HIGH (ref 70–99)
GLUCOSE BLDC GLUCOMTR-MCNC: 155 MG/DL — HIGH (ref 70–99)
GLUCOSE BLDC GLUCOMTR-MCNC: 186 MG/DL — HIGH (ref 70–99)
GLUCOSE BLDC GLUCOMTR-MCNC: 307 MG/DL — HIGH (ref 70–99)
GLUCOSE BLDV-MCNC: 143 — HIGH (ref 70–99)
GLUCOSE SERPL-MCNC: 137 MG/DL — HIGH (ref 70–99)
HCO3 BLDV-SCNC: 21 MMOL/L — SIGNIFICANT CHANGE UP (ref 20–27)
HCT VFR BLD CALC: 40.9 % — SIGNIFICANT CHANGE UP (ref 39–50)
HCT VFR BLDV CALC: 40.1 % — SIGNIFICANT CHANGE UP (ref 39–51)
HGB BLD-MCNC: 12.8 G/DL — LOW (ref 13–17)
HGB BLDV-MCNC: 13 G/DL — SIGNIFICANT CHANGE UP (ref 13–17)
INR BLD: 1.1 — SIGNIFICANT CHANGE UP (ref 0.88–1.17)
LACTATE BLDV-MCNC: 2.6 MMOL/L — HIGH (ref 0.5–2)
MAGNESIUM SERPL-MCNC: 2.6 MG/DL — SIGNIFICANT CHANGE UP (ref 1.6–2.6)
MCHC RBC-ENTMCNC: 29.9 PG — SIGNIFICANT CHANGE UP (ref 27–34)
MCHC RBC-ENTMCNC: 31.3 % — LOW (ref 32–36)
MCV RBC AUTO: 95.6 FL — SIGNIFICANT CHANGE UP (ref 80–100)
NRBC # FLD: 0 K/UL — LOW (ref 25–125)
PCO2 BLDV: 47 MMHG — SIGNIFICANT CHANGE UP (ref 41–51)
PH BLDV: 7.31 PH — LOW (ref 7.32–7.43)
PHOSPHATE SERPL-MCNC: 4.3 MG/DL — SIGNIFICANT CHANGE UP (ref 2.5–4.5)
PLATELET # BLD AUTO: 169 K/UL — SIGNIFICANT CHANGE UP (ref 150–400)
PMV BLD: 10 FL — SIGNIFICANT CHANGE UP (ref 7–13)
PO2 BLDV: 29 MMHG — LOW (ref 35–40)
POTASSIUM BLDV-SCNC: 3.6 MMOL/L — SIGNIFICANT CHANGE UP (ref 3.4–4.5)
POTASSIUM SERPL-MCNC: 4 MMOL/L — SIGNIFICANT CHANGE UP (ref 3.5–5.3)
POTASSIUM SERPL-SCNC: 4 MMOL/L — SIGNIFICANT CHANGE UP (ref 3.5–5.3)
POTASSIUM UR-SCNC: 13.1 MMOL/L — SIGNIFICANT CHANGE UP
PROTHROM AB SERPL-ACNC: 12.2 SEC — SIGNIFICANT CHANGE UP (ref 9.8–13.1)
RBC # BLD: 4.28 M/UL — SIGNIFICANT CHANGE UP (ref 4.2–5.8)
RBC # FLD: 13.6 % — SIGNIFICANT CHANGE UP (ref 10.3–14.5)
RH IG SCN BLD-IMP: NEGATIVE — SIGNIFICANT CHANGE UP
SAO2 % BLDV: 44.2 % — LOW (ref 60–85)
SODIUM SERPL-SCNC: 148 MMOL/L — HIGH (ref 135–145)
SODIUM UR-SCNC: 107 MMOL/L — SIGNIFICANT CHANGE UP
UUN UR-MCNC: 194.8 MG/DL — SIGNIFICANT CHANGE UP
VANCOMYCIN FLD-MCNC: 9.6 UG/ML — SIGNIFICANT CHANGE UP
WBC # BLD: 5.67 K/UL — SIGNIFICANT CHANGE UP (ref 3.8–10.5)
WBC # FLD AUTO: 5.67 K/UL — SIGNIFICANT CHANGE UP (ref 3.8–10.5)

## 2019-03-18 PROCEDURE — 99223 1ST HOSP IP/OBS HIGH 75: CPT | Mod: GC

## 2019-03-18 PROCEDURE — 99254 IP/OBS CNSLTJ NEW/EST MOD 60: CPT | Mod: GC

## 2019-03-18 PROCEDURE — 99233 SBSQ HOSP IP/OBS HIGH 50: CPT

## 2019-03-18 PROCEDURE — 71045 X-RAY EXAM CHEST 1 VIEW: CPT | Mod: 26

## 2019-03-18 PROCEDURE — 99232 SBSQ HOSP IP/OBS MODERATE 35: CPT | Mod: GC

## 2019-03-18 PROCEDURE — 93306 TTE W/DOPPLER COMPLETE: CPT | Mod: 26

## 2019-03-18 RX ORDER — FUROSEMIDE 40 MG
40 TABLET ORAL ONCE
Qty: 0 | Refills: 0 | Status: COMPLETED | OUTPATIENT
Start: 2019-03-18 | End: 2019-03-18

## 2019-03-18 RX ORDER — VANCOMYCIN HCL 1 G
500 VIAL (EA) INTRAVENOUS ONCE
Qty: 0 | Refills: 0 | Status: DISCONTINUED | OUTPATIENT
Start: 2019-03-18 | End: 2019-03-19

## 2019-03-18 RX ORDER — VANCOMYCIN HCL 1 G
750 VIAL (EA) INTRAVENOUS ONCE
Qty: 0 | Refills: 0 | Status: DISCONTINUED | OUTPATIENT
Start: 2019-03-18 | End: 2019-03-18

## 2019-03-18 RX ORDER — HEPARIN SODIUM 5000 [USP'U]/ML
5000 INJECTION INTRAVENOUS; SUBCUTANEOUS EVERY 8 HOURS
Qty: 0 | Refills: 0 | Status: DISCONTINUED | OUTPATIENT
Start: 2019-03-18 | End: 2019-03-24

## 2019-03-18 RX ADMIN — INSULIN GLARGINE 15 UNIT(S): 100 INJECTION, SOLUTION SUBCUTANEOUS at 21:37

## 2019-03-18 RX ADMIN — Medication 2: at 13:27

## 2019-03-18 RX ADMIN — Medication 3 MILLILITER(S): at 10:10

## 2019-03-18 RX ADMIN — SODIUM CHLORIDE 4 MILLILITER(S): 9 INJECTION INTRAMUSCULAR; INTRAVENOUS; SUBCUTANEOUS at 05:03

## 2019-03-18 RX ADMIN — SODIUM CHLORIDE 4 MILLILITER(S): 9 INJECTION INTRAMUSCULAR; INTRAVENOUS; SUBCUTANEOUS at 23:27

## 2019-03-18 RX ADMIN — Medication 4 UNIT(S): at 13:26

## 2019-03-18 RX ADMIN — Medication 650 MILLIGRAM(S): at 15:37

## 2019-03-18 RX ADMIN — PIPERACILLIN AND TAZOBACTAM 25 GRAM(S): 4; .5 INJECTION, POWDER, LYOPHILIZED, FOR SOLUTION INTRAVENOUS at 00:00

## 2019-03-18 RX ADMIN — Medication 3 MILLILITER(S): at 15:27

## 2019-03-18 RX ADMIN — Medication 8: at 17:23

## 2019-03-18 RX ADMIN — Medication 650 MILLIGRAM(S): at 16:37

## 2019-03-18 RX ADMIN — Medication 4 UNIT(S): at 17:22

## 2019-03-18 RX ADMIN — Medication 3 MILLILITER(S): at 05:03

## 2019-03-18 RX ADMIN — PIPERACILLIN AND TAZOBACTAM 25 GRAM(S): 4; .5 INJECTION, POWDER, LYOPHILIZED, FOR SOLUTION INTRAVENOUS at 13:26

## 2019-03-18 RX ADMIN — SODIUM CHLORIDE 4 MILLILITER(S): 9 INJECTION INTRAMUSCULAR; INTRAVENOUS; SUBCUTANEOUS at 10:14

## 2019-03-18 RX ADMIN — Medication 40 MILLIGRAM(S): at 10:26

## 2019-03-18 RX ADMIN — SODIUM CHLORIDE 4 MILLILITER(S): 9 INJECTION INTRAMUSCULAR; INTRAVENOUS; SUBCUTANEOUS at 15:29

## 2019-03-18 RX ADMIN — Medication 3 MILLILITER(S): at 23:22

## 2019-03-18 NOTE — CONSULT NOTE ADULT - SUBJECTIVE AND OBJECTIVE BOX
HPI:    PAST MEDICAL & SURGICAL HISTORY:  Deep vein thrombosis (DVT): R leg s/p anticoagulation  [place] (hard of hearing)  Dementia  Hyperlipidemia  Cancer of skin      FAMILY HISTORY:  No pertinent family history in first degree relatives      SOCIAL HISTORY:  Smoking: __ packs x ___ years  EtOH Use:  Marital Status:  Occupation:  Exposures:  Recent Travel:    Allergies    No Known Allergies    Intolerances        HOME MEDICATIONS:    REVIEW OF SYSTEMS:  Constitutional: No fevers or chills. No weight loss. No fatigue or generalised malaise.  Eyes: No itching or discharge from the eyes  ENT: No ear pain. No ear discharge. No nasal congestion. No post nasal drip. No epistaxis. No throat pain. No sore throat. No difficulty swallowing.   CV: No chest pain. No palpitations. No lightheadedness or dizziness.   Resp: No dyspnea at rest. No dyspnea on exertion. No orthopnea. No wheezing. No cough. No stridor. No sputum production. No chest pain with respiration.  GI: No nausea. No vomiting. No diarrhea.  MSK: No joint pain or pain in any extremities  Integumentary: No skin lesions. No pedal edema.  Neurological: No gross motor weakness. No sensory changes.    [ ] All other systems negative  [ ] Unable to assess ROS because ________    OBJECTIVE:  ICU Vital Signs Last 24 Hrs  T(C): 36.7 (18 Mar 2019 13:45), Max: 37.1 (18 Mar 2019 05:58)  T(F): 98 (18 Mar 2019 13:45), Max: 98.7 (18 Mar 2019 05:58)  HR: 81 (18 Mar 2019 15:29) (73 - 94)  BP: 156/92 (18 Mar 2019 13:45) (118/74 - 156/92)  BP(mean): --  ABP: --  ABP(mean): --  RR: 18 (18 Mar 2019 13:45) (17 - 18)  SpO2: 96% (18 Mar 2019 15:29) (94% - 100%)        03-17 @ 07:01  -  03-18 @ 07:00  --------------------------------------------------------  IN: 0 mL / OUT: 2500 mL / NET: -2500 mL    03-18 @ 07:01  -  03-18 @ 16:12  --------------------------------------------------------  IN: 1240 mL / OUT: 1140 mL / NET: 100 mL      CAPILLARY BLOOD GLUCOSE      POCT Blood Glucose.: 152 mg/dL (18 Mar 2019 13:21)      PHYSICAL EXAM:  General: Awake, alert, oriented X 3.   HEENT: Atraumatic, normocephalic.                 Mallampatti Grade                 No nasal congestion.                No tonsillar or pharyngeal exudates.  Lymph Nodes: No palpable lymphadenopathy  Neck: No JVD. No carotid bruit.   Respiratory: Normal chest expansion                         Normal percussion                         Normal and equal air entry                         No wheeze, rhonchi or rales.  Cardiovascular: S1 S2 normal. No murmurs, rubs or gallops.   Abdomen: Soft, non-tender, non-distended. No organomegaly.  Extremities: Warm to touch. Peripheral pulse palpable. No pedal edema.   Skin: No rashes or skin lesions  Neurological: Motor and sensory examination equal and normal in all four extremities.  Psychiatry: Appropriate mood and affect.    HOSPITAL MEDICATIONS:  MEDICATIONS  (STANDING):  ALBUTerol/ipratropium for Nebulization 3 milliLiter(s) Nebulizer every 6 hours  dextrose 5%. 1000 milliLiter(s) (50 mL/Hr) IV Continuous <Continuous>  dextrose 50% Injectable 12.5 Gram(s) IV Push once  dextrose 50% Injectable 25 Gram(s) IV Push once  dextrose 50% Injectable 25 Gram(s) IV Push once  insulin glargine Injectable (LANTUS) 15 Unit(s) SubCutaneous at bedtime  insulin lispro (HumaLOG) corrective regimen sliding scale   SubCutaneous three times a day before meals  insulin lispro (HumaLOG) corrective regimen sliding scale   SubCutaneous at bedtime  insulin lispro Injectable (HumaLOG) 4 Unit(s) SubCutaneous three times a day before meals  piperacillin/tazobactam IVPB. 3.375 Gram(s) IV Intermittent every 12 hours  sodium chloride 3%  Inhalation 4 milliLiter(s) Inhalation every 6 hours    MEDICATIONS  (PRN):  acetaminophen   Tablet .. 650 milliGRAM(s) Oral every 6 hours PRN Mild Pain (1 - 3), Moderate Pain (4 - 6), Severe Pain (7 - 10)  acetaminophen   Tablet .. 650 milliGRAM(s) Oral every 6 hours PRN Temp greater or equal to 38C (100.4F)  acetaminophen  Suppository .. 650 milliGRAM(s) Rectal every 6 hours PRN Temp greater or equal to 38C (100.4F)  dextrose 40% Gel 15 Gram(s) Oral once PRN Blood Glucose LESS THAN 70 milliGRAM(s)/deciliter  glucagon  Injectable 1 milliGRAM(s) IntraMuscular once PRN Glucose LESS THAN 70 milligrams/deciliter  QUEtiapine 25 milliGRAM(s) Oral every 6 hours PRN agitation      LABS:                        12.8   5.67  )-----------( 169      ( 18 Mar 2019 09:00 )             40.9     03-18    148<H>  |  110<H>  |  55<H>  ----------------------------<  137<H>  4.0   |  21<L>  |  2.44<H>    Ca    8.7      18 Mar 2019 09:00  Phos  4.3     03-18  Mg     2.6     03-18      PT/INR - ( 18 Mar 2019 09:00 )   PT: 12.2 SEC;   INR: 1.10          PTT - ( 18 Mar 2019 09:00 )  PTT:28.4 SEC      Venous Blood Gas:  03-18 @ 09:00  7.31/47/29/21/44.2  VBG Lactate: 2.6      MICROBIOLOGY:     RADIOLOGY:  [ ] Reviewed and interpreted by me    Point of Care Ultrasound Findings;    PFT:    EKG: HPI:  87yo [place]nian-speaking M with PMHx of DM, hard of hearing, DVT in the past, skin cancer (s/p excision) presenting with AMS and respiratory distress following an unwitnessed fall yesterday.  Per family has had multiple recent falls. But this time he "looked blue and almost dead after".  Pt was admitted to the hospital with severe sepsis, lactic acidosis, Pneumonia rhabdo and HAVEN.  He was placed on Bipap for distress, and has been on Bi-Level PRN and over night. His stay has been complicated by worsening HAVEN and Pulmonary edema.   Seen now in no distress, not answering but speaking clearly.  He only complaints of cold.     No history of lung disease no smoking, no history of heart disease, at baseline takes care of his ADLs.     PAST MEDICAL & SURGICAL HISTORY:  Deep vein thrombosis (DVT): R leg s/p anticoagulation  [place] (hard of hearing)  Dementia  Hyperlipidemia  Cancer of skin      FAMILY HISTORY:  No pertinent family history in first degree relatives      SOCIAL HISTORY:  Smoking:No.  __ packs x ___ years  EtOH Use:  Marital Status:  Occupation:  Exposures:  Recent Travel:    Allergies    No Known Allergies    Intolerances        HOME MEDICATIONS:    REVIEW OF SYSTEMS:      [ ] All other systems negative  [x] Unable to assess ROS because: Pt is not endorsing any ROS.  ________    OBJECTIVE:  ICU Vital Signs Last 24 Hrs  T(C): 36.7 (18 Mar 2019 13:45), Max: 37.1 (18 Mar 2019 05:58)  T(F): 98 (18 Mar 2019 13:45), Max: 98.7 (18 Mar 2019 05:58)  HR: 81 (18 Mar 2019 15:29) (73 - 94)  BP: 156/92 (18 Mar 2019 13:45) (118/74 - 156/92)  BP(mean): --  ABP: --  ABP(mean): --  RR: 18 (18 Mar 2019 13:45) (17 - 18)  SpO2: 96% (18 Mar 2019 15:29) (94% - 100%)        03-17 @ 07:01  -  03-18 @ 07:00  --------------------------------------------------------  IN: 0 mL / OUT: 2500 mL / NET: -2500 mL    03-18 @ 07:01  -  03-18 @ 16:12  --------------------------------------------------------  IN: 1240 mL / OUT: 1140 mL / NET: 100 mL      CAPILLARY BLOOD GLUCOSE      POCT Blood Glucose.: 152 mg/dL (18 Mar 2019 13:21)      PHYSICAL EXAM:  General: Awake, alert, not responding to questions likely due to [place]  HEENT: Unable to asses, not openming mouth  Neck: No JVD.  Respiratory: INspiratoty rales on left, but Pt is not siting up..  Cardiovascular: S1 S2 normal. No murmurs, rubs or gallops.   Abdomen: Soft, non-tender, non-distended. No organomegaly.  Extremities: Warm to touch. Peripheral pulse palpable. No pedal edema.   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   Neurological: not following comands  Psychiatry: Appropriate mood and affect.    HOSPITAL MEDICATIONS:  MEDICATIONS  (STANDING):  ALBUTerol/ipratropium for Nebulization 3 milliLiter(s) Nebulizer every 6 hours  dextrose 5%. 1000 milliLiter(s) (50 mL/Hr) IV Continuous <Continuous>  dextrose 50% Injectable 12.5 Gram(s) IV Push once  dextrose 50% Injectable 25 Gram(s) IV Push once  dextrose 50% Injectable 25 Gram(s) IV Push once  insulin glargine Injectable (LANTUS) 15 Unit(s) SubCutaneous at bedtime  insulin lispro (HumaLOG) corrective regimen sliding scale   SubCutaneous three times a day before meals  insulin lispro (HumaLOG) corrective regimen sliding scale   SubCutaneous at bedtime  insulin lispro Injectable (HumaLOG) 4 Unit(s) SubCutaneous three times a day before meals  piperacillin/tazobactam IVPB. 3.375 Gram(s) IV Intermittent every 12 hours  sodium chloride 3%  Inhalation 4 milliLiter(s) Inhalation every 6 hours    MEDICATIONS  (PRN):  acetaminophen   Tablet .. 650 milliGRAM(s) Oral every 6 hours PRN Mild Pain (1 - 3), Moderate Pain (4 - 6), Severe Pain (7 - 10)  acetaminophen   Tablet .. 650 milliGRAM(s) Oral every 6 hours PRN Temp greater or equal to 38C (100.4F)  acetaminophen  Suppository .. 650 milliGRAM(s) Rectal every 6 hours PRN Temp greater or equal to 38C (100.4F)  dextrose 40% Gel 15 Gram(s) Oral once PRN Blood Glucose LESS THAN 70 milliGRAM(s)/deciliter  glucagon  Injectable 1 milliGRAM(s) IntraMuscular once PRN Glucose LESS THAN 70 milligrams/deciliter  QUEtiapine 25 milliGRAM(s) Oral every 6 hours PRN agitation      LABS:                        12.8   5.67  )-----------( 169      ( 18 Mar 2019 09:00 )             40.9     03-18    148<H>  |  110<H>  |  55<H>  ----------------------------<  137<H>  4.0   |  21<L>  |  2.44<H>    Ca    8.7      18 Mar 2019 09:00  Phos  4.3     03-18  Mg     2.6     03-18      PT/INR - ( 18 Mar 2019 09:00 )   PT: 12.2 SEC;   INR: 1.10          PTT - ( 18 Mar 2019 09:00 )  PTT:28.4 SEC      Venous Blood Gas:  03-18 @ 09:00  7.31/47/29/21/44.2  VBG Lactate: 2.6      MICROBIOLOGY:     RADIOLOGY:  [ ] Reviewed and interpreted by me    Point of Care Ultrasound Findings;    PFT:    EKG:

## 2019-03-18 NOTE — CHART NOTE - NSCHARTNOTEFT_GEN_A_CORE
house pulmonary  and nephrology called; will follow up their recommendations. Urine studies ordered as instructed by nephrology fellow

## 2019-03-18 NOTE — CHART NOTE - NSCHARTNOTEFT_GEN_A_CORE
Pt pulled concepcion out. called nephrology fellow back; was advised pt was evaluated and their recommendation include continue with IV antibiotics, concepcion not indicated to be replaced at this time; will re assess Creatinine level in AM . TOV to start now

## 2019-03-18 NOTE — PROGRESS NOTE ADULT - ASSESSMENT
Assessment and Plan:   Pt is a 89yo [place]ian-speaking M with PMHx of DM, hard of hearing presenting for presented with AMS and respiratory distress following an unwitnessed fall, found to have L femoral neck fracture, sepsis likely 2/2 multifocal PNA, and acute Hypoxic and hypercarbic respiratory failure; improved on BIPAP and abx    #Acute Hypoxic and Hypercarbic Respiratory Failure  - respiratory distress, coarse rhonchi, CT showing multifocal PNA   - c/w vanc by level / zosyn  - c/w NC w/ bipap at night   - standing duonebs   - Chest PT as pt can tolerate  - congestion on CXR, s/p lasix 40 mg IV X 2 over weekend. Still wheezing b/l, likely volume overloaded.   -Lasix 40mg X1   -Pulmonary consult to optimize pulm condition for OR  -Check CXR stat  -2D echo    #Sepsis  2/2 PNA  blood cultures negative  c/w abx as above    #Hyperglycemia  c/w lantus, ISS     #HAVEN  - initial Cr .86, trending up, stable today after IV diuresis  - likely prerenal, possible contrast induced nephropathy from CT   - making urine, strict I&Os  - urine lytes   - renally dose meds   -Renal    # Confusion  - improving with BIPAP, 2/2 from hypercarbia  - CTH negative for acute pathology     #L femoral neck fracture  - ortho appreciate recs: if medically optimized, will go to OR. However, pt is still not optimized clinically, volume over loaded, HAVEN  - pending medical stabilization  - tylenol for pain control     #prophylactic measure  - DVT - HSQ (consider lovenox given fracture, however HAVEN)  - Advanced Directive: son at bedside, extensive conversation regarding current critical medical illness, son understood, DNR/DNI, MOLST in chart

## 2019-03-18 NOTE — CONSULT NOTE ADULT - ASSESSMENT
87yo [place]-speaking M with PMHx of DM, hard of hearing, DVT in the past, skin cancer (s/p excision) presenting with AMS and respiratory distress following an unwitnessed fall yesterday.  Per family has had multiple recent falls. presented with severe sepsis, rhabdo, HAVEN, PNA, and was place on bipap for distress and hypercapnia. Complicated by developing pulmonary edema, now improved.    On review of blood gas pt was only minimally hypercapnic on vbg while in shock.   ON abg pt was in respiratory alkalosis to th 23., compensating for metabolic acidosis and this has been true through out his stay.     - No need for Bi-pap, recommend addressing metabolic acidosis as a cause of tachypnea and respiratory compensation   - Currently appears to be out of pulmonary edema,  would keep slightly negative/neutral, though given his CK/Rhabdo this will prove difficult'  - Continue with zosyn for a total of 5-7days, consider stoping vanc      Based on Ariscat SCORE pt is an intermediate risk for procedure.  But appears to be currently optimized for necessary procedure.     Jersey Beal MD PGY6  Pulmonary and Critical Care Fellow  p# [phone] 89yo [place]ian-speaking M with PMHx of DM, hard of hearing, DVT in the past, skin cancer (s/p excision) presenting with AMS and respiratory distress following an unwitnessed fall yesterday.  Per family has had multiple recent falls. presented with severe sepsis, rhabdo, HAVEN, PNA, and was place on bipap for distress and hypercapnia. Complicated by developing pulmonary edema, now improved.    On review of blood gas pt was only minimally hypercapnic on vbg while in shock.   ON abg pt was in respiratory alkalosis to th 23., compensating for metabolic acidosis and this has been true through out his stay.     - No need for Bi-pap, recommend addressing metabolic acidosis as a cause of tachypnea and respiratory compensation   - Currently appears to be out of pulmonary edema,  would keep slightly negative/neutral, though given his CK/Rhabdo this will prove difficult'  - Continue with zosyn for a total of 5-7days, consider stoping vanc      Based on Ariscat SCORE pt is an intermediate risk for procedure.  But appears to be currently optimized from a pulmonary stand point for necessary procedure.     Jersey Beal MD PGY6  Pulmonary and Critical Care Fellow  p# [phone]

## 2019-03-18 NOTE — CONSULT NOTE ADULT - ASSESSMENT
88 year old male with HLD, DVT s/p AC, dementia and hearing loss who presents to the hospital after a fall and hip fracture. Nephrology consulted for HAVEN.    Acute Kidney Injury    Patient with acute kidney injury likely multifactorial in nature. Patient s/p fall and poor PO intake likely placed patient in a pre-renal/hypovolemic state initially and patient had second hit with contrast causing the creatinine to rise. Now patient is on vancomycin/zosyn which can also lead to HAVEN from direct tubular damage or interstitial nephritis however that is less likely. Urine studies suggest intrinsic renal disease so this could be non-oliguric ATN and if continues to rise may recommend switching antibiotics. Interestingly last UA showed large blood and protein as well and would quantify proteinuria with urine protein/creatinine ratio and would repeat the UA. Otherwise maintain Garcia to measure UOP, avoid nephrotoxic medications (dose vancomycin daily with trough levels), maintain adequate perfusion pressures, and renall dose medications. Nephrology will continue to follow.

## 2019-03-18 NOTE — CONSULT NOTE ADULT - ATTENDING COMMENTS
Pt admitted with acute respiratory failure, metabolic acidosis with compensated respiratory alkalosis.   Discontinue bilevel.   Pt is at intermediate risk for pulmonary complications with general anesthesia given his age and PNA. Clinically doing well, 94-96% O2 sat on RA.  pt is optimized from a pulmonary standpoint.

## 2019-03-18 NOTE — PROGRESS NOTE ADULT - SUBJECTIVE AND OBJECTIVE BOX
Orthopedic Surgery Progress Note  Continues to be on bipap in setting of respiratory failure 2/2 multifocal PNA. Patient restless and agitated overnight.  Not medically cleared for OR today.     O:  Vitals 24hrs  Vital Signs Last 24 Hrs  T(C): 36.9 (18 Mar 2019 01:46), Max: 37 (17 Mar 2019 21:58)  T(F): 98.4 (18 Mar 2019 01:46), Max: 98.6 (17 Mar 2019 21:58)  HR: 84 (18 Mar 2019 05:03) (82 - 101)  BP: 130/90 (18 Mar 2019 01:46) (127/81 - 144/78)  BP(mean): --  RR: 17 (18 Mar 2019 01:46) (17 - 19)  SpO2: 98% (18 Mar 2019 05:03) (95% - 100%)      03-16-19 @ 07:01  -  03-17-19 @ 07:00  --------------------------------------------------------  IN: 400 mL / OUT: 2000 mL / NET: -1600 mL    03-17-19 @ 07:01  -  03-18-19 @ 06:31  --------------------------------------------------------  IN: 0 mL / OUT: 2500 mL / NET: -2500 mL      Lab Results 24hrs:                        12.1   5.70  )-----------( 116      ( 17 Mar 2019 05:30 )             36.9     03-17    140  |  106  |  51<H>  ----------------------------<  191<H>  4.3   |  18<L>  |  2.38<H>    Ca    8.2<L>      17 Mar 2019 05:30  Mg     2.4     03-17        Gen: NAD  LLE  skin intact  shortened/ER  patient non-compliant with exam  WWP distally      A/P 88y year old male s/p with L femoral neck fracture and multifocal PNA currently on BiPAP and HAVEN.  Possible OR 3/18 for L hip hemiarthroplasty however not medically optimized or cleared for surgery  FU medical clearance  Pain Control  DVT PPX per primary  NWB LLE  Continue abx and supportive care for PNA  DNR/DNI Orthopedic Surgery Progress Note  Continues to be on bipap in setting of respiratory failure 2/2 multifocal PNA. Patient restless and agitated overnight.  Not medically cleared for OR today.     O:  Vitals 24hrs  Vital Signs Last 24 Hrs  T(C): 36.9 (18 Mar 2019 01:46), Max: 37 (17 Mar 2019 21:58)  T(F): 98.4 (18 Mar 2019 01:46), Max: 98.6 (17 Mar 2019 21:58)  HR: 84 (18 Mar 2019 05:03) (82 - 101)  BP: 130/90 (18 Mar 2019 01:46) (127/81 - 144/78)  BP(mean): --  RR: 17 (18 Mar 2019 01:46) (17 - 19)  SpO2: 98% (18 Mar 2019 05:03) (95% - 100%)      03-16-19 @ 07:01  -  03-17-19 @ 07:00  --------------------------------------------------------  IN: 400 mL / OUT: 2000 mL / NET: -1600 mL    03-17-19 @ 07:01  -  03-18-19 @ 06:31  --------------------------------------------------------  IN: 0 mL / OUT: 2500 mL / NET: -2500 mL      Lab Results 24hrs:                        12.1   5.70  )-----------( 116      ( 17 Mar 2019 05:30 )             36.9     03-17    140  |  106  |  51<H>  ----------------------------<  191<H>  4.3   |  18<L>  |  2.38<H>    Ca    8.2<L>      17 Mar 2019 05:30  Mg     2.4     03-17        Gen: NAD  LLE  skin intact  shortened/ER  patient non-compliant with exam  WWP distally      A/P 88y year old male s/p with L femoral neck fracture and multifocal PNA currently on BiPAP and HAVEN.  not medically optimized or cleared for surgery  If not cleared for OR 3/18 for L hip hemiarthroplasty can change diet to regular  FU medical clearance  Pain Control  DVT PPX per primary  NWB LLE  Continue abx and supportive care for PNA  DNR/DNI

## 2019-03-18 NOTE — PROGRESS NOTE ADULT - ATTENDING COMMENTS
I agree with the above note on this patient. All pertinent films have been reviewed. Please refer to clinical documentation of the history, physical examinations, data summary, and both assessment and plan as documented above and with which I agree.    still not medically cleared, will reassess in the AM if OR candidate, otherwise may need to further delay surgery    Abdiel Shabazz MD  Attending Orthopedic Surgeon I agree with the above note and have personally seen and examined this patient. All pertinent films have been reviewed. Please refer to clinical documentation of the history, physical examinations, data summary, and both assessment and plan as documented above and with which I agree.    ortho team d/w patient son, HCP, who prefers nonop for now. will plan delayed vs electively in future if desired by patient/family for hemiarthroplasty. at this time patient not medically cleared/optimized for OR today      Abdiel Shabazz MD  Attending Orthopedic Surgeon

## 2019-03-18 NOTE — CONSULT NOTE ADULT - SUBJECTIVE AND OBJECTIVE BOX
[facility] [place] [facility] Division of Kidney Diseases & Hypertension  INITIAL CONSULT NOTE  [phone]--------------------------------------------------------------------------------    88 year old male with HLD, DVT s/p AC, dementia and hearing loss who presents to the hospital after a fall and hip fracture. Patient's son at bedside giving collateral information. Son states that patient is in a lot of pain from the hip fracture and he is supposed to go for repair per surgery. Patient noted to have PNA on antibiotics and also hypercarbic respiratory failure requiring BiPAP at night. Patient's creatinine noted to be 2.44 and uptrending and was normal on admission. On admission patient received contrast load as well and creatinine started to climb after that. Patient's son states he had not been eating and drinking as well at home either. Nephrology consulted for HAVEN.        PAST HISTORY  --------------------------------------------------------------------------------  PAST MEDICAL & SURGICAL HISTORY:  Deep vein thrombosis (DVT): R leg s/p anticoagulation  Native (hard of hearing)  Dementia  Hyperlipidemia  Cancer of skin    FAMILY HISTORY:  No pertinent family history in first degree relatives    PAST SOCIAL HISTORY: Lives with son.    ALLERGIES & MEDICATIONS  --------------------------------------------------------------------------------  Allergies    No Known Allergies    Intolerances      Standing Inpatient Medications  ALBUTerol/ipratropium for Nebulization 3 milliLiter(s) Nebulizer every 6 hours  heparin  Injectable 5000 Unit(s) SubCutaneous every 8 hours  insulin glargine Injectable (LANTUS) 15 Unit(s) SubCutaneous at bedtime  insulin lispro (HumaLOG) corrective regimen sliding scale   SubCutaneous three times a day before meals  insulin lispro (HumaLOG) corrective regimen sliding scale   SubCutaneous at bedtime  insulin lispro Injectable (HumaLOG) 4 Unit(s) SubCutaneous three times a day before meals  piperacillin/tazobactam IVPB. 3.375 Gram(s) IV Intermittent every 12 hours  sodium chloride 3%  Inhalation 4 milliLiter(s) Inhalation every 6 hours  vancomycin  IVPB 500 milliGRAM(s) IV Intermittent once    PRN Inpatient Medications  acetaminophen   Tablet .. 650 milliGRAM(s) Oral every 6 hours PRN  acetaminophen   Tablet .. 650 milliGRAM(s) Oral every 6 hours PRN  acetaminophen  Suppository .. 650 milliGRAM(s) Rectal every 6 hours PRN  dextrose 40% Gel 15 Gram(s) Oral once PRN  glucagon  Injectable 1 milliGRAM(s) IntraMuscular once PRN  QUEtiapine 25 milliGRAM(s) Oral every 6 hours PRN      REVIEW OF SYSTEMS:  MSK: Patient complaining of pain in the hip    Other review of systems unable to obtain as patient is not answering questions appropriately.    VITALS/PHYSICAL EXAM  --------------------------------------------------------------------------------  T(C): 36.4 (03-18-19 @ 17:51), Max: 37.1 (03-18-19 @ 05:58)  HR: 89 (03-18-19 @ 17:51) (73 - 94)  BP: 128/74 (03-18-19 @ 17:51) (118/74 - 156/92)  RR: 18 (03-18-19 @ 17:51) (17 - 18)  SpO2: 96% (03-18-19 @ 17:51) (94% - 100%)      03-17-19 @ 07:01  -  03-18-19 @ 07:00  --------------------------------------------------------  IN: 0 mL / OUT: 2500 mL / NET: -2500 mL    03-18-19 @ 07:01  -  03-18-19 @ 18:06  --------------------------------------------------------  IN: 1240 mL / OUT: 2140 mL / NET: -900 mL      Physical Exam:  	Gen: NAD, cachectic  	HEENT: Anicteric  	Pulm: Coarse breath sounds.  	CV:  S1S2  	Abd: +BS, soft, non-tender   	Ext: No B/L Lower ext edema  	Neuro: No focal deficits  	Skin: Warm  	Vascular: No cyanosis     LABS/STUDIES  --------------------------------------------------------------------------------              12.8   5.67  >-----------<  169      [03-18-19 @ 09:00]              40.9     148  |  110  |  55  ----------------------------<  137      [03-18-19 @ 09:00]  4.0   |  21  |  2.44        Ca     8.7     [03-18-19 @ 09:00]      Mg     2.6     [03-18-19 @ 09:00]      Phos  4.3     [03-18-19 @ 09:00]      PT/INR: PT 12.2 , INR 1.10       [03-18-19 @ 09:00]  PTT: 28.4       [03-18-19 @ 09:00]    CK 2088      [03-17-19 @ 05:30]    Creatinine Trend:  SCr 2.44 [03-18 @ 09:00]  SCr 2.38 [03-17 @ 05:30]  SCr 2.33 [03-16 @ 05:30]  SCr 2.14 [03-15 @ 06:08]  SCr 1.54 [03-14 @ 21:41]    Urinalysis - [03-16-19 @ 12:15]      Color YELLOW / Appearance TURBID / SG 1.020 / pH 6.0      Gluc NEGATIVE / Ketone NEGATIVE  / Bili NEGATIVE / Urobili NORMAL       Blood LARGE / Protein 100 / Leuk Est TRACE / Nitrite NEGATIVE      RBC >50 / WBC 5-10 / Hyaline 0-2 / Gran  / Sq Epi  / Non Sq Epi OCC / Bacteria     Urine Creatinine 18.20      [03-18-19 @ 13:05]  Urine Sodium 107      [03-18-19 @ 13:05]  Urine Urea Nitrogen 194.8      [03-18-19 @ 13:05]  Urine Potassium 13.1      [03-18-19 @ 13:05]  Urine Chloride 100      [03-18-19 @ 13:05]    TSH 2.09      [03-14-19 @ 01:45]

## 2019-03-19 LAB
ALBUMIN SERPL ELPH-MCNC: 2.6 G/DL — LOW (ref 3.3–5)
ALP SERPL-CCNC: 61 U/L — SIGNIFICANT CHANGE UP (ref 40–120)
ALT FLD-CCNC: 53 U/L — HIGH (ref 4–41)
ANION GAP SERPL CALC-SCNC: 13 MMO/L — SIGNIFICANT CHANGE UP (ref 7–14)
APPEARANCE UR: SIGNIFICANT CHANGE UP
AST SERPL-CCNC: 65 U/L — HIGH (ref 4–40)
BACTERIA # UR AUTO: NEGATIVE — SIGNIFICANT CHANGE UP
BACTERIA BLD CULT: SIGNIFICANT CHANGE UP
BACTERIA BLD CULT: SIGNIFICANT CHANGE UP
BILIRUB SERPL-MCNC: 0.7 MG/DL — SIGNIFICANT CHANGE UP (ref 0.2–1.2)
BILIRUB UR-MCNC: NEGATIVE — SIGNIFICANT CHANGE UP
BLOOD UR QL VISUAL: HIGH
BUN SERPL-MCNC: 52 MG/DL — HIGH (ref 7–23)
CALCIUM SERPL-MCNC: 8.1 MG/DL — LOW (ref 8.4–10.5)
CHLORIDE SERPL-SCNC: 110 MMOL/L — HIGH (ref 98–107)
CO2 SERPL-SCNC: 21 MMOL/L — LOW (ref 22–31)
COLOR SPEC: SIGNIFICANT CHANGE UP
CREAT SERPL-MCNC: 2.29 MG/DL — HIGH (ref 0.5–1.3)
GLUCOSE BLDC GLUCOMTR-MCNC: 115 MG/DL — HIGH (ref 70–99)
GLUCOSE BLDC GLUCOMTR-MCNC: 123 MG/DL — HIGH (ref 70–99)
GLUCOSE BLDC GLUCOMTR-MCNC: 197 MG/DL — HIGH (ref 70–99)
GLUCOSE BLDC GLUCOMTR-MCNC: 213 MG/DL — HIGH (ref 70–99)
GLUCOSE BLDC GLUCOMTR-MCNC: 221 MG/DL — HIGH (ref 70–99)
GLUCOSE BLDC GLUCOMTR-MCNC: 67 MG/DL — LOW (ref 70–99)
GLUCOSE BLDC GLUCOMTR-MCNC: 74 MG/DL — SIGNIFICANT CHANGE UP (ref 70–99)
GLUCOSE SERPL-MCNC: 136 MG/DL — HIGH (ref 70–99)
GLUCOSE UR-MCNC: 150 — HIGH
HCT VFR BLD CALC: 34.7 % — LOW (ref 39–50)
HGB BLD-MCNC: 11.4 G/DL — LOW (ref 13–17)
INR BLD: 1.16 — SIGNIFICANT CHANGE UP (ref 0.88–1.17)
KETONES UR-MCNC: NEGATIVE — SIGNIFICANT CHANGE UP
LEUKOCYTE ESTERASE UR-ACNC: NEGATIVE — SIGNIFICANT CHANGE UP
MAGNESIUM SERPL-MCNC: 2.4 MG/DL — SIGNIFICANT CHANGE UP (ref 1.6–2.6)
MCHC RBC-ENTMCNC: 30.4 PG — SIGNIFICANT CHANGE UP (ref 27–34)
MCHC RBC-ENTMCNC: 32.9 % — SIGNIFICANT CHANGE UP (ref 32–36)
MCV RBC AUTO: 92.5 FL — SIGNIFICANT CHANGE UP (ref 80–100)
NITRITE UR-MCNC: NEGATIVE — SIGNIFICANT CHANGE UP
NRBC # FLD: 0 K/UL — LOW (ref 25–125)
PH UR: 6 — SIGNIFICANT CHANGE UP (ref 5–8)
PHOSPHATE SERPL-MCNC: 3.7 MG/DL — SIGNIFICANT CHANGE UP (ref 2.5–4.5)
PLATELET # BLD AUTO: 160 K/UL — SIGNIFICANT CHANGE UP (ref 150–400)
PMV BLD: 10 FL — SIGNIFICANT CHANGE UP (ref 7–13)
POTASSIUM SERPL-MCNC: 3.3 MMOL/L — LOW (ref 3.5–5.3)
POTASSIUM SERPL-SCNC: 3.3 MMOL/L — LOW (ref 3.5–5.3)
PROT SERPL-MCNC: 5.8 G/DL — LOW (ref 6–8.3)
PROT UR-MCNC: 30 — SIGNIFICANT CHANGE UP
PROTHROM AB SERPL-ACNC: 13.3 SEC — HIGH (ref 9.8–13.1)
RBC # BLD: 3.75 M/UL — LOW (ref 4.2–5.8)
RBC # FLD: 13.3 % — SIGNIFICANT CHANGE UP (ref 10.3–14.5)
RBC CASTS # UR COMP ASSIST: HIGH (ref 0–?)
SODIUM SERPL-SCNC: 144 MMOL/L — SIGNIFICANT CHANGE UP (ref 135–145)
SP GR SPEC: 1.01 — SIGNIFICANT CHANGE UP (ref 1–1.04)
SQUAMOUS # UR AUTO: SIGNIFICANT CHANGE UP
UROBILINOGEN FLD QL: NORMAL — SIGNIFICANT CHANGE UP
VANCOMYCIN TROUGH SERPL-MCNC: 8.8 UG/ML — LOW (ref 10–20)
WBC # BLD: 4.73 K/UL — SIGNIFICANT CHANGE UP (ref 3.8–10.5)
WBC # FLD AUTO: 4.73 K/UL — SIGNIFICANT CHANGE UP (ref 3.8–10.5)
WBC UR QL: SIGNIFICANT CHANGE UP (ref 0–?)

## 2019-03-19 PROCEDURE — 99233 SBSQ HOSP IP/OBS HIGH 50: CPT

## 2019-03-19 PROCEDURE — 99233 SBSQ HOSP IP/OBS HIGH 50: CPT | Mod: GC

## 2019-03-19 RX ORDER — FUROSEMIDE 40 MG
40 TABLET ORAL DAILY
Qty: 0 | Refills: 0 | Status: DISCONTINUED | OUTPATIENT
Start: 2019-03-20 | End: 2019-03-21

## 2019-03-19 RX ORDER — FUROSEMIDE 40 MG
40 TABLET ORAL ONCE
Qty: 0 | Refills: 0 | Status: COMPLETED | OUTPATIENT
Start: 2019-03-19 | End: 2019-03-19

## 2019-03-19 RX ORDER — DEXTROSE 50 % IN WATER 50 %
12.5 SYRINGE (ML) INTRAVENOUS ONCE
Qty: 0 | Refills: 0 | Status: COMPLETED | OUTPATIENT
Start: 2019-03-19 | End: 2019-03-19

## 2019-03-19 RX ORDER — POTASSIUM CHLORIDE 20 MEQ
40 PACKET (EA) ORAL EVERY 4 HOURS
Qty: 0 | Refills: 0 | Status: DISCONTINUED | OUTPATIENT
Start: 2019-03-19 | End: 2019-03-19

## 2019-03-19 RX ORDER — POTASSIUM CHLORIDE 20 MEQ
40 PACKET (EA) ORAL EVERY 4 HOURS
Qty: 0 | Refills: 0 | Status: COMPLETED | OUTPATIENT
Start: 2019-03-19 | End: 2019-03-19

## 2019-03-19 RX ORDER — POTASSIUM CHLORIDE 20 MEQ
40 PACKET (EA) ORAL ONCE
Qty: 0 | Refills: 0 | Status: DISCONTINUED | OUTPATIENT
Start: 2019-03-19 | End: 2019-03-19

## 2019-03-19 RX ORDER — VANCOMYCIN HCL 1 G
1000 VIAL (EA) INTRAVENOUS ONCE
Qty: 0 | Refills: 0 | Status: COMPLETED | OUTPATIENT
Start: 2019-03-19 | End: 2019-03-19

## 2019-03-19 RX ADMIN — Medication 40 MILLIGRAM(S): at 12:16

## 2019-03-19 RX ADMIN — HEPARIN SODIUM 5000 UNIT(S): 5000 INJECTION INTRAVENOUS; SUBCUTANEOUS at 18:20

## 2019-03-19 RX ADMIN — Medication 40 MILLIEQUIVALENT(S): at 12:17

## 2019-03-19 RX ADMIN — HEPARIN SODIUM 5000 UNIT(S): 5000 INJECTION INTRAVENOUS; SUBCUTANEOUS at 05:52

## 2019-03-19 RX ADMIN — Medication 12.5 GRAM(S): at 22:41

## 2019-03-19 RX ADMIN — SODIUM CHLORIDE 4 MILLILITER(S): 9 INJECTION INTRAMUSCULAR; INTRAVENOUS; SUBCUTANEOUS at 04:50

## 2019-03-19 RX ADMIN — SODIUM CHLORIDE 4 MILLILITER(S): 9 INJECTION INTRAMUSCULAR; INTRAVENOUS; SUBCUTANEOUS at 11:30

## 2019-03-19 RX ADMIN — PIPERACILLIN AND TAZOBACTAM 25 GRAM(S): 4; .5 INJECTION, POWDER, LYOPHILIZED, FOR SOLUTION INTRAVENOUS at 23:51

## 2019-03-19 RX ADMIN — SODIUM CHLORIDE 4 MILLILITER(S): 9 INJECTION INTRAMUSCULAR; INTRAVENOUS; SUBCUTANEOUS at 23:12

## 2019-03-19 RX ADMIN — PIPERACILLIN AND TAZOBACTAM 25 GRAM(S): 4; .5 INJECTION, POWDER, LYOPHILIZED, FOR SOLUTION INTRAVENOUS at 12:16

## 2019-03-19 RX ADMIN — Medication 250 MILLIGRAM(S): at 09:37

## 2019-03-19 RX ADMIN — HEPARIN SODIUM 5000 UNIT(S): 5000 INJECTION INTRAVENOUS; SUBCUTANEOUS at 00:01

## 2019-03-19 RX ADMIN — HEPARIN SODIUM 5000 UNIT(S): 5000 INJECTION INTRAVENOUS; SUBCUTANEOUS at 23:05

## 2019-03-19 RX ADMIN — Medication 3 MILLILITER(S): at 11:29

## 2019-03-19 RX ADMIN — Medication 4 UNIT(S): at 18:20

## 2019-03-19 RX ADMIN — PIPERACILLIN AND TAZOBACTAM 25 GRAM(S): 4; .5 INJECTION, POWDER, LYOPHILIZED, FOR SOLUTION INTRAVENOUS at 00:00

## 2019-03-19 RX ADMIN — Medication 3 MILLILITER(S): at 23:21

## 2019-03-19 RX ADMIN — Medication 4 UNIT(S): at 09:37

## 2019-03-19 RX ADMIN — Medication 40 MILLIEQUIVALENT(S): at 18:20

## 2019-03-19 RX ADMIN — INSULIN GLARGINE 15 UNIT(S): 100 INJECTION, SOLUTION SUBCUTANEOUS at 23:05

## 2019-03-19 RX ADMIN — Medication 3 MILLILITER(S): at 04:45

## 2019-03-19 RX ADMIN — Medication 4: at 12:17

## 2019-03-19 RX ADMIN — Medication 4 UNIT(S): at 12:17

## 2019-03-19 NOTE — PROGRESS NOTE ADULT - ASSESSMENT
Patient with acute kidney injury likely multifactorial in nature.  -contrast causing the creatinine to rise leading  to HAVEN from direct tubular damage   - may need Garcia back ----  -avoid nephrotoxic medications (dose vancomycin daily with trough levels),   -maintain adequate perfusion pressures, and renall dose medications. Nephrology will continue to follow.	  - can given Lasix

## 2019-03-19 NOTE — PHYSICAL THERAPY INITIAL EVALUATION ADULT - PERTINENT HX OF CURRENT PROBLEM, REHAB EVAL
Pt is a 87yo [place]nian-speaking M with PMHx of DM, hard of hearing presenting for presented with AMS and respiratory distress following an unwitnessed fall, found to have L femoral neck fracture, sepsis likely 2/2 multifocal PNA, and acute Hypoxic and hypercarbic respiratory failure. Pt is a 89yo Bosnian-speaking M with PMHx of DM, hard of hearing presenting for presented with AMS and respiratory distress following an unwitnessed fall, found to have Left femoral neck fracture, sepsis likely 2/2 multifocal PNA, and acute Hypoxic and hypercarbic respiratory failure.

## 2019-03-19 NOTE — PROGRESS NOTE ADULT - SUBJECTIVE AND OBJECTIVE BOX
Patient is a 88y old  Male who presents with a chief complaint of fall (18 Mar 2019 18:05)      SUBJECTIVE / OVERNIGHT EVENTS:  No complaints. Off BiPAP, tolerated well.    MEDICATIONS  (STANDING):  ALBUTerol/ipratropium for Nebulization 3 milliLiter(s) Nebulizer every 6 hours  dextrose 5%. 1000 milliLiter(s) (50 mL/Hr) IV Continuous <Continuous>  dextrose 50% Injectable 12.5 Gram(s) IV Push once  dextrose 50% Injectable 25 Gram(s) IV Push once  dextrose 50% Injectable 25 Gram(s) IV Push once  furosemide   Injectable 40 milliGRAM(s) IV Push once  heparin  Injectable 5000 Unit(s) SubCutaneous every 8 hours  insulin glargine Injectable (LANTUS) 15 Unit(s) SubCutaneous at bedtime  insulin lispro (HumaLOG) corrective regimen sliding scale   SubCutaneous three times a day before meals  insulin lispro (HumaLOG) corrective regimen sliding scale   SubCutaneous at bedtime  insulin lispro Injectable (HumaLOG) 4 Unit(s) SubCutaneous three times a day before meals  piperacillin/tazobactam IVPB. 3.375 Gram(s) IV Intermittent every 12 hours  potassium chloride    Tablet ER 40 milliEquivalent(s) Oral every 4 hours  sodium chloride 3%  Inhalation 4 milliLiter(s) Inhalation every 6 hours    MEDICATIONS  (PRN):  acetaminophen   Tablet .. 650 milliGRAM(s) Oral every 6 hours PRN Mild Pain (1 - 3), Moderate Pain (4 - 6), Severe Pain (7 - 10)  acetaminophen   Tablet .. 650 milliGRAM(s) Oral every 6 hours PRN Temp greater or equal to 38C (100.4F)  acetaminophen  Suppository .. 650 milliGRAM(s) Rectal every 6 hours PRN Temp greater or equal to 38C (100.4F)  dextrose 40% Gel 15 Gram(s) Oral once PRN Blood Glucose LESS THAN 70 milliGRAM(s)/deciliter  glucagon  Injectable 1 milliGRAM(s) IntraMuscular once PRN Glucose LESS THAN 70 milligrams/deciliter  QUEtiapine 25 milliGRAM(s) Oral every 6 hours PRN agitation      Vital Signs Last 24 Hrs  T(C): 36.8 (19 Mar 2019 08:49), Max: 37.1 (19 Mar 2019 04:46)  T(F): 98.3 (19 Mar 2019 08:49), Max: 98.8 (19 Mar 2019 04:46)  HR: 80 (19 Mar 2019 11:30) (80 - 93)  BP: 129/73 (19 Mar 2019 08:49) (128/74 - 156/92)  BP(mean): --  RR: 18 (19 Mar 2019 08:49) (18 - 19)  SpO2: 98% (19 Mar 2019 11:30) (95% - 100%)  CAPILLARY BLOOD GLUCOSE      POCT Blood Glucose.: 115 mg/dL (19 Mar 2019 07:43)  POCT Blood Glucose.: 186 mg/dL (18 Mar 2019 21:33)  POCT Blood Glucose.: 307 mg/dL (18 Mar 2019 16:49)  POCT Blood Glucose.: 152 mg/dL (18 Mar 2019 13:21)    I&O's Summary    18 Mar 2019 07:01  -  19 Mar 2019 07:00  --------------------------------------------------------  IN: 1240 mL / OUT: 2140 mL / NET: -900 mL    19 Mar 2019 07:01  -  19 Mar 2019 11:45  --------------------------------------------------------  IN: 250 mL / OUT: 0 mL / NET: 250 mL        PHYSICAL EXAM:  GENERAL: NAD, well-developed  HEAD:  Atraumatic, Normocephalic  EYES: EOMI, PERRLA, conjunctiva and sclera clear  NECK: Supple, No JVD  CHEST/LUNG: (+) rhonchi bilaterally; No wheeze  HEART: Regular rate and rhythm; No murmurs, rubs, or gallops  ABDOMEN: Soft, Nontender, Nondistended; Bowel sounds present  EXTREMITIES:  2+ Peripheral Pulses, No clubbing, cyanosis, or edema  PSYCH: AAOx3  NEUROLOGY: non-focal  SKIN: No rashes or lesions    LABS:                        11.4   4.73  )-----------( 160      ( 19 Mar 2019 06:30 )             34.7     03-19    144  |  110<H>  |  52<H>  ----------------------------<  136<H>  3.3<L>   |  21<L>  |  2.29<H>    Ca    8.1<L>      19 Mar 2019 06:30  Phos  3.7     03-19  Mg     2.4     03-19    TPro  5.8<L>  /  Alb  2.6<L>  /  TBili  0.7  /  DBili  x   /  AST  65<H>  /  ALT  53<H>  /  AlkPhos  61  03-19    PT/INR - ( 19 Mar 2019 06:30 )   PT: 13.3 SEC;   INR: 1.16          PTT - ( 18 Mar 2019 09:00 )  PTT:28.4 SEC          RADIOLOGY & ADDITIONAL TESTS:  < from: Transthoracic Echocardiogram (03.18.19 @ 19:01) >  CONCLUSIONS:  1. Normal mitral valve. Minimal mitral regurgitation.  2. Normal trileaflet aortic valve. No aortic valve  regurgitation seen.  3. Normal left ventricular systolic function.No segmental  wall motion abnormalities. Flattening of the  interventricular septum in both systole and diastole is  consistent with right ventricular pressure overload.  4. Moderate diastolic dysfunction (Stage II).  5. Right ventricular enlargementwith normal right  ventricular systolic function.  6. Normal tricuspid valve.  Moderate-severe tricuspid  regurgitation.  7. Normal pericardium with no pericardial effusion.  *** No previous Echo exam.    < end of copied text >    Imaging Personally Reviewed:    Consultant(s) Notes Reviewed:      Care Discussed with Consultants/Other Providers:

## 2019-03-19 NOTE — CONSULT NOTE ADULT - ASSESSMENT
Pulmonary HTN, elevated right sided pressures   due to underlying PNA, hypoxic, hypercapnic respiratory disorder  anbx  pulm eval  diuresis as needed     HAVEN  improving   follow up with renal     DM  Monitor finger stick. Insulin coverage. Diabetic education and Diabetic diet. Consider nutrition consultation.    elevated trop  stress induced in setting of sepsis   normal LV function

## 2019-03-19 NOTE — PROGRESS NOTE ADULT - ASSESSMENT
Assessment and Plan:   Pt is a 87yo Bosnian-speaking M with PMHx of DM, hard of hearing presenting for presented with AMS and respiratory distress following an unwitnessed fall, found to have L femoral neck fracture, sepsis likely 2/2 multifocal PNA, and acute Hypoxic and hypercarbic respiratory failure; improved on BIPAP and abx    #Acute Hypoxic and Hypercarbic Respiratory Failure  - respiratory distress, coarse rhonchi, CT showing multifocal PNA   - c/w vanc by level / zosyn X 7 days  - standing duonebs   - Chest PT as pt can tolerate  - congestion on CXR, s/p lasix 40 mg IV X 3, still overloaded. Echo showed moderate diastolic dysfunction and severe pulm HTN   -Lasix 40mg IVP daily  -Pulmonary consult appreciated  -Cardiology consult to optimize cardiac status.    #Sepsis  2/2 PNA  blood cultures negative  c/w abx as above    #Hyperglycemia  c/w lantus, ISS     #HAVEN  - initial Cr .86, trending up, stable today after IV diuresis  - likely prerenal, possible contrast induced nephropathy from CT   - making urine, strict I&Os  - urine lytes   - renally dose meds   -Renal input appreciated.    # Confusion  - improving with BIPAP, 2/2 from hypercarbia  - CTH negative for acute pathology     #L femoral neck fracture  - ortho appreciate recs: Spoke to ortho consult, Ortho is leaning toward elective hemiarthroplasty when pt is medically stable. No plan for urgent procedure at this time as per Ortho.   - tylenol for pain control     #prophylactic measure  - DVT - HSQ (consider lovenox given fracture, however HAVEN)  - Advanced Directive: son at bedside, extensive conversation regarding current critical medical illness, son understood, DNR/DNI, MOLST in chart

## 2019-03-19 NOTE — PHYSICAL THERAPY INITIAL EVALUATION ADULT - DIAGNOSIS, PT EVAL
Pt s/p fall; X-Ray (+) left transcervical femoral neck fracture; pt may go for surgical intervention when medically cleared as per RN; US (-) for DVT; pt admitted for AMS and respiratory distress; Pt presents with decreased strength and decreased functional activity

## 2019-03-19 NOTE — PROGRESS NOTE ADULT - SUBJECTIVE AND OBJECTIVE BOX
[facility] DIVISION OF KIDNEY DISEASES AND HYPERTENSION -- FOLLOW UP NOTE  --------------------------------------------------------------------------------    Chief Complaint: HAVEN    24 hour events/subjective: high pulmonary pressures on echo. concepcion out but bladder sono shows urine         PAST HISTORY  --------------------------------------------------------------------------------  No significant changes to PMH, PSH, FHx, SHx, unless otherwise noted    ALLERGIES & MEDICATIONS  --------------------------------------------------------------------------------  Allergies    No Known Allergies    Intolerances      Standing Inpatient Medications  ALBUTerol/ipratropium for Nebulization 3 milliLiter(s) Nebulizer every 6 hours  dextrose 5%. 1000 milliLiter(s) IV Continuous <Continuous>  dextrose 50% Injectable 12.5 Gram(s) IV Push once  dextrose 50% Injectable 25 Gram(s) IV Push once  dextrose 50% Injectable 25 Gram(s) IV Push once  heparin  Injectable 5000 Unit(s) SubCutaneous every 8 hours  insulin glargine Injectable (LANTUS) 15 Unit(s) SubCutaneous at bedtime  insulin lispro (HumaLOG) corrective regimen sliding scale   SubCutaneous three times a day before meals  insulin lispro (HumaLOG) corrective regimen sliding scale   SubCutaneous at bedtime  insulin lispro Injectable (HumaLOG) 4 Unit(s) SubCutaneous three times a day before meals  piperacillin/tazobactam IVPB. 3.375 Gram(s) IV Intermittent every 12 hours  sodium chloride 3%  Inhalation 4 milliLiter(s) Inhalation every 6 hours    PRN Inpatient Medications  acetaminophen   Tablet .. 650 milliGRAM(s) Oral every 6 hours PRN  acetaminophen   Tablet .. 650 milliGRAM(s) Oral every 6 hours PRN  acetaminophen  Suppository .. 650 milliGRAM(s) Rectal every 6 hours PRN  dextrose 40% Gel 15 Gram(s) Oral once PRN  glucagon  Injectable 1 milliGRAM(s) IntraMuscular once PRN  QUEtiapine 25 milliGRAM(s) Oral every 6 hours PRN      REVIEW OF SYSTEMS  --------------------------------------------------------------------------------  Gen: + weakness  Skin: No new rashes  Head/Eyes/Ears/Mouth: No headache  Respiratory: No dyspnea,   CV: No chest pain,   GI: No abdominal pain,  : No hematuria,  MSK: No joint pain/swelling; edema  Neuro:  no seizures,  Psych: No anxiety  All other systems were reviewed and are negative, except as noted.        VITALS/PHYSICAL EXAM  --------------------------------------------------------------------------------  T(C): 36.4 (03-19-19 @ 16:45), Max: 37.1 (03-19-19 @ 04:46)  HR: 94 (03-19-19 @ 16:45) (80 - 99)  BP: 123/81 (03-19-19 @ 16:45) (122/69 - 146/99)  RR: 16 (03-19-19 @ 16:45) (16 - 19)  SpO2: 98% (03-19-19 @ 16:45) (96% - 100%)  Wt(kg): --        03-18-19 @ 07:01  -  03-19-19 @ 07:00  --------------------------------------------------------  IN: 1240 mL / OUT: 2140 mL / NET: -900 mL    03-19-19 @ 07:01  -  03-19-19 @ 18:45  --------------------------------------------------------  IN: 840 mL / OUT: 500 mL / NET: 340 mL      Physical Exam:             Gen: NAD,   	HEENT: supple neck, NO JVD   	Pulm: CTA B/L              CV: RRR, S1S2; no rub  	Abd: +BS, soft, nontender/nondistended  	UE:  no edema;   	LE:  no edema  	Neuro: No focal deficits,   	Psych: Normal affect and mood          	>>> <<<    LABS/STUDIES  --------------------------------------------------------------------------------              11.4   4.73  >-----------<  160      [03-19-19 @ 06:30]              34.7     144  |  110  |  52  ----------------------------<  136      [03-19-19 @ 06:30]  3.3   |  21  |  2.29        Ca     8.1     [03-19-19 @ 06:30]      Mg     2.4     [03-19-19 @ 06:30]      Phos  3.7     [03-19-19 @ 06:30]    TPro  5.8  /  Alb  2.6  /  TBili  0.7  /  DBili  x   /  AST  65  /  ALT  53  /  AlkPhos  61  [03-19-19 @ 06:30]    PT/INR: PT 13.3 , INR 1.16       [03-19-19 @ 06:30]  PTT: 28.4       [03-18-19 @ 09:00]      Creatinine Trend:  SCr 2.29 [03-19 @ 06:30]  SCr 2.44 [03-18 @ 09:00]  SCr 2.38 [03-17 @ 05:30]  SCr 2.33 [03-16 @ 05:30]  SCr 2.14 [03-15 @ 06:08]    Sodium trend:    Urinalysis - [03-19-19 @ 12:00]      Color LIGHT YELLOW / Appearance Lt TURBID / SG 1.013 / pH 6.0      Gluc 150 / Ketone NEGATIVE  / Bili NEGATIVE / Urobili NORMAL       Blood MODERATE / Protein 30 / Leuk Est NEGATIVE / Nitrite NEGATIVE      RBC 26-50 / WBC 3-5 / Hyaline  / Gran  / Sq Epi OCC / Non Sq Epi  / Bacteria NEGATIVE    Urine Creatinine 18.20      [03-18-19 @ 13:05]  Urine Sodium 107      [03-18-19 @ 13:05]  Urine Urea Nitrogen 194.8      [03-18-19 @ 13:05]  Urine Potassium 13.1      [03-18-19 @ 13:05]  Urine Chloride 100      [03-18-19 @ 13:05]    TSH 2.09      [03-14-19 @ 01:45]

## 2019-03-19 NOTE — CONSULT NOTE ADULT - SUBJECTIVE AND OBJECTIVE BOX
CHIEF COMPLAINT:Patient is a 88y old  Male who presents with a chief complaint of fall (19 Mar 2019 11:44)      HISTORY OF PRESENT ILLNESS:    Pt is a 89yo [place]ian-speaking M with PMHx of DM, hard of hearing presenting for presented with AMS and respiratory distress following an unwitnessed fall, found to have L femoral neck fracture, sepsis likely 2/2 multifocal PNA, and acute Hypoxic and hypercarbic respiratory failure; improved on BIPAP and abx now with HAVEN   found to have pulm HTN   cardiology is called for eval   Due to altered mental status, subjective information were not able to be obtained from the patient. History was obtained, to the extent possible, from review of the chart and collateral sources of information.  grandson by bedside helping with history     PAST MEDICAL & SURGICAL HISTORY:  Deep vein thrombosis (DVT): R leg s/p anticoagulation  [place] (hard of hearing)  Dementia  Hyperlipidemia  Cancer of skin          MEDICATIONS:  heparin  Injectable 5000 Unit(s) SubCutaneous every 8 hours    piperacillin/tazobactam IVPB. 3.375 Gram(s) IV Intermittent every 12 hours    ALBUTerol/ipratropium for Nebulization 3 milliLiter(s) Nebulizer every 6 hours  sodium chloride 3%  Inhalation 4 milliLiter(s) Inhalation every 6 hours    acetaminophen   Tablet .. 650 milliGRAM(s) Oral every 6 hours PRN  acetaminophen   Tablet .. 650 milliGRAM(s) Oral every 6 hours PRN  acetaminophen  Suppository .. 650 milliGRAM(s) Rectal every 6 hours PRN  QUEtiapine 25 milliGRAM(s) Oral every 6 hours PRN      dextrose 40% Gel 15 Gram(s) Oral once PRN  dextrose 50% Injectable 12.5 Gram(s) IV Push once  dextrose 50% Injectable 25 Gram(s) IV Push once  dextrose 50% Injectable 25 Gram(s) IV Push once  glucagon  Injectable 1 milliGRAM(s) IntraMuscular once PRN  insulin glargine Injectable (LANTUS) 15 Unit(s) SubCutaneous at bedtime  insulin lispro (HumaLOG) corrective regimen sliding scale   SubCutaneous three times a day before meals  insulin lispro (HumaLOG) corrective regimen sliding scale   SubCutaneous at bedtime  insulin lispro Injectable (HumaLOG) 4 Unit(s) SubCutaneous three times a day before meals    dextrose 5%. 1000 milliLiter(s) IV Continuous <Continuous>  potassium chloride   Powder 40 milliEquivalent(s) Oral every 4 hours      FAMILY HISTORY:  No pertinent family history in first degree relatives      Non-contributory    SOCIAL HISTORY:    No tobacco, drugs or etoh    Allergies    No Known Allergies    Intolerances    	    REVIEW OF SYSTEMS:  as above  The rest of the 14 points ROS reviewed and except above they are unremarkable.        PHYSICAL EXAM:  T(C): 36.7 (03-19-19 @ 12:52), Max: 37.1 (03-19-19 @ 04:46)  HR: 99 (03-19-19 @ 12:52) (80 - 99)  BP: 122/69 (03-19-19 @ 12:52) (122/69 - 146/99)  RR: 18 (03-19-19 @ 12:52) (18 - 19)  SpO2: 97% (03-19-19 @ 12:52) (96% - 100%)  Wt(kg): --  I&O's Summary    18 Mar 2019 07:01  -  19 Mar 2019 07:00  --------------------------------------------------------  IN: 1240 mL / OUT: 2140 mL / NET: -900 mL    19 Mar 2019 07:01  -  19 Mar 2019 16:22  --------------------------------------------------------  IN: 500 mL / OUT: 500 mL / NET: 0 mL        JVP: Normal  Neck: supple  Lung: clear   CV: S1 S2 , Murmur:  Abd: soft  Ext: No edema  neuro: Awake / alert  Psych: flat affect  Skin: normal      LABS/DATA:    TELEMETRY: 	    ECG:  	sinus nonspecific st changes    	  CARDIAC MARKERS:                                      11.4   4.73  )-----------( 160      ( 19 Mar 2019 06:30 )             34.7     03-19    144  |  110<H>  |  52<H>  ----------------------------<  136<H>  3.3<L>   |  21<L>  |  2.29<H>    Ca    8.1<L>      19 Mar 2019 06:30  Phos  3.7     03-19  Mg     2.4     03-19    TPro  5.8<L>  /  Alb  2.6<L>  /  TBili  0.7  /  DBili  x   /  AST  65<H>  /  ALT  53<H>  /  AlkPhos  61  03-19  proBNP: Serum Pro-Brain Natriuretic Peptide: 438.2 pg/mL (03-14 @ 01:45)    Lipid Profile:   HgA1c:   TSH:       < from: Transthoracic Echocardiogram (03.18.19 @ 19:01) >  CONCLUSIONS:  1. Normal mitral valve. Minimal mitral regurgitation.  2. Normal trileaflet aortic valve. No aortic valve  regurgitation seen.  3. Normal left ventricular systolic function.No segmental  wall motion abnormalities. Flattening of the  interventricular septum in both systole and diastole is  consistent with right ventricular pressure overload.  4. Moderate diastolic dysfunction (Stage II).  5. Right ventricular enlargementwith normal right  ventricular systolic function.  6. Normal tricuspid valve.  Moderate-severe tricuspid  regurgitation.  7. Normal pericardium with no pericardial effusion.  *** No previous Echo exam.    < end of copied text >  < from: CT Chest w/ IV Cont (03.14.19 @ 03:38) >    EXAM:  CT CHEST IC      EXAM:  CT ABDOMEN AND PELVIS IC        PROCEDURE DATE:  Mar 14 2019         INTERPRETATION:  CLINICAL INFORMATION: Trauma. Respiratory failure.         COMPARISON: None available.    PROCEDURE:   CT of the Chest, Abdomen andPelvis was performed with intravenous   contrast.   Imaging was performed through the chest in the arterial phase followed by   imaging of the abdomen and pelvis in the portal venous phase.  Intravenous contrast: 90 ml Omnipaque 350. 10 ml discarded.  Oral contrast:None.  Sagittal and coronal reformats were performed.    FINDINGS:    CHEST:     LUNGS AND LARGE AIRWAYS: Dependent groundglass opacities and   consolidations the bilateral upper, right middle, and bilateral lower   lobes.  PLEURA: Small bilateral pleural effusions.  VESSELS: No aortic aneurysm or dissection.  HEART: Heart size is normal. Small pericardial effusion.  MEDIASTINUM AND ROBERTO: Calcified right hilar lymph nodes.  CHEST WALL AND LOWER NECK: No rib fractures.     ABDOMEN AND PELVIS:    LIVER: Within normal limits.  BILE DUCTS: Normal caliber.  GALLBLADDER: Within normal limits.  SPLEEN: Within normal limits.  PANCREAS: Atrophic.  ADRENALS: Within normal limits.  KIDNEYS/URETERS: Bilateral cortical scarring and renal cysts.    BLADDER: Garcia catheter in place. The small intravesicular air, likely   iatrogenic.  REPRODUCTIVE ORGANS: Prostate within normal limits.    BOWEL: No bowel obstruction.   PERITONEUM: No ascites.  VESSELS:  Within normal limits.  RETROPERITONEUM: No lymphadenopathy.    ABDOMINAL WALL: Within normal limits.  BONES: Acute left transcervical left femoral neck fracture with mild   superolateral displacement of the distal fracture fragment and associated   joint effusion. No dislocation. Hematoma/edema in the left iliacus   muscle. Degenerative changes of the thoracolumbar spine.    IMPRESSION:     Bilateral multifocal airspace opacities predominantly in dependent areas.   Multifocal pneumonia can have this appearance. However in the trauma  setting, these findings are likely indicating pulmonary contusion with   atelectasis.    Acute left transcervical left femoral neck fracture with mild   superolateral displacement of the distal fracture fragment.     These findings were discussed by Dr. Bah with Dr. Keller at   3/14/2019 4:08 AM with read back confirmation.    < end of copied text > CHIEF COMPLAINT:Patient is a 88y old  Male who presents with a chief complaint of fall (19 Mar 2019 11:44)      HISTORY OF PRESENT ILLNESS:    Pt is a 89yo [place]ian-speaking M with PMHx of DM, hard of hearing presenting for presented with AMS and respiratory distress following an unwitnessed fall, found to have L femoral neck fracture, sepsis likely 2/2 multifocal PNA, and acute Hypoxic and hypercarbic respiratory failure; improved on BIPAP and abx now with HAVEN   found to have pulm HTN   cardiology is called for eval   Due to altered mental status, subjective information were not able to be obtained from the patient. History was obtained, to the extent possible, from review of the chart and collateral sources of information.  grandson by bedside helping with history     PAST MEDICAL & SURGICAL HISTORY:  Deep vein thrombosis (DVT): R leg s/p anticoagulation  [place] (hard of hearing)  Dementia  Hyperlipidemia  Cancer of skin          MEDICATIONS:  heparin  Injectable 5000 Unit(s) SubCutaneous every 8 hours    piperacillin/tazobactam IVPB. 3.375 Gram(s) IV Intermittent every 12 hours    ALBUTerol/ipratropium for Nebulization 3 milliLiter(s) Nebulizer every 6 hours  sodium chloride 3%  Inhalation 4 milliLiter(s) Inhalation every 6 hours    acetaminophen   Tablet .. 650 milliGRAM(s) Oral every 6 hours PRN  acetaminophen   Tablet .. 650 milliGRAM(s) Oral every 6 hours PRN  acetaminophen  Suppository .. 650 milliGRAM(s) Rectal every 6 hours PRN  QUEtiapine 25 milliGRAM(s) Oral every 6 hours PRN      dextrose 40% Gel 15 Gram(s) Oral once PRN  dextrose 50% Injectable 12.5 Gram(s) IV Push once  dextrose 50% Injectable 25 Gram(s) IV Push once  dextrose 50% Injectable 25 Gram(s) IV Push once  glucagon  Injectable 1 milliGRAM(s) IntraMuscular once PRN  insulin glargine Injectable (LANTUS) 15 Unit(s) SubCutaneous at bedtime  insulin lispro (HumaLOG) corrective regimen sliding scale   SubCutaneous three times a day before meals  insulin lispro (HumaLOG) corrective regimen sliding scale   SubCutaneous at bedtime  insulin lispro Injectable (HumaLOG) 4 Unit(s) SubCutaneous three times a day before meals    dextrose 5%. 1000 milliLiter(s) IV Continuous <Continuous>  potassium chloride   Powder 40 milliEquivalent(s) Oral every 4 hours      FAMILY HISTORY:  No pertinent family history in first degree relatives      Non-contributory    SOCIAL HISTORY:    No tobacco, drugs or etoh    Allergies    No Known Allergies    Intolerances    	    REVIEW OF SYSTEMS:  as above  The rest of the 14 points ROS reviewed and except above they are unremarkable.        PHYSICAL EXAM:  T(C): 36.7 (03-19-19 @ 12:52), Max: 37.1 (03-19-19 @ 04:46)  HR: 99 (03-19-19 @ 12:52) (80 - 99)  BP: 122/69 (03-19-19 @ 12:52) (122/69 - 146/99)  RR: 18 (03-19-19 @ 12:52) (18 - 19)  SpO2: 97% (03-19-19 @ 12:52) (96% - 100%)  Wt(kg): --  I&O's Summary    18 Mar 2019 07:01  -  19 Mar 2019 07:00  --------------------------------------------------------  IN: 1240 mL / OUT: 2140 mL / NET: -900 mL    19 Mar 2019 07:01  -  19 Mar 2019 16:22  --------------------------------------------------------  IN: 500 mL / OUT: 500 mL / NET: 0 mL        JVP: Normal  Neck: supple  Lung: few rhonchi   CV: S1 S2 , Murmur:  Abd: soft  Ext: No edema  neuro: Awake / alert  Psych: flat affect  Skin: normal      LABS/DATA:    TELEMETRY: 	    ECG:  	sinus nonspecific st changes    	  CARDIAC MARKERS:                                      11.4   4.73  )-----------( 160      ( 19 Mar 2019 06:30 )             34.7     03-19    144  |  110<H>  |  52<H>  ----------------------------<  136<H>  3.3<L>   |  21<L>  |  2.29<H>    Ca    8.1<L>      19 Mar 2019 06:30  Phos  3.7     03-19  Mg     2.4     03-19    TPro  5.8<L>  /  Alb  2.6<L>  /  TBili  0.7  /  DBili  x   /  AST  65<H>  /  ALT  53<H>  /  AlkPhos  61  03-19  proBNP: Serum Pro-Brain Natriuretic Peptide: 438.2 pg/mL (03-14 @ 01:45)    Lipid Profile:   HgA1c:   TSH:       < from: Transthoracic Echocardiogram (03.18.19 @ 19:01) >  CONCLUSIONS:  1. Normal mitral valve. Minimal mitral regurgitation.  2. Normal trileaflet aortic valve. No aortic valve  regurgitation seen.  3. Normal left ventricular systolic function.No segmental  wall motion abnormalities. Flattening of the  interventricular septum in both systole and diastole is  consistent with right ventricular pressure overload.  4. Moderate diastolic dysfunction (Stage II).  5. Right ventricular enlargementwith normal right  ventricular systolic function.  6. Normal tricuspid valve.  Moderate-severe tricuspid  regurgitation.  7. Normal pericardium with no pericardial effusion.  *** No previous Echo exam.    < end of copied text >  < from: CT Chest w/ IV Cont (03.14.19 @ 03:38) >    EXAM:  CT CHEST IC      EXAM:  CT ABDOMEN AND PELVIS IC        PROCEDURE DATE:  Mar 14 2019         INTERPRETATION:  CLINICAL INFORMATION: Trauma. Respiratory failure.         COMPARISON: None available.    PROCEDURE:   CT of the Chest, Abdomen andPelvis was performed with intravenous   contrast.   Imaging was performed through the chest in the arterial phase followed by   imaging of the abdomen and pelvis in the portal venous phase.  Intravenous contrast: 90 ml Omnipaque 350. 10 ml discarded.  Oral contrast:None.  Sagittal and coronal reformats were performed.    FINDINGS:    CHEST:     LUNGS AND LARGE AIRWAYS: Dependent groundglass opacities and   consolidations the bilateral upper, right middle, and bilateral lower   lobes.  PLEURA: Small bilateral pleural effusions.  VESSELS: No aortic aneurysm or dissection.  HEART: Heart size is normal. Small pericardial effusion.  MEDIASTINUM AND ROBERTO: Calcified right hilar lymph nodes.  CHEST WALL AND LOWER NECK: No rib fractures.     ABDOMEN AND PELVIS:    LIVER: Within normal limits.  BILE DUCTS: Normal caliber.  GALLBLADDER: Within normal limits.  SPLEEN: Within normal limits.  PANCREAS: Atrophic.  ADRENALS: Within normal limits.  KIDNEYS/URETERS: Bilateral cortical scarring and renal cysts.    BLADDER: Garcia catheter in place. The small intravesicular air, likely   iatrogenic.  REPRODUCTIVE ORGANS: Prostate within normal limits.    BOWEL: No bowel obstruction.   PERITONEUM: No ascites.  VESSELS:  Within normal limits.  RETROPERITONEUM: No lymphadenopathy.    ABDOMINAL WALL: Within normal limits.  BONES: Acute left transcervical left femoral neck fracture with mild   superolateral displacement of the distal fracture fragment and associated   joint effusion. No dislocation. Hematoma/edema in the left iliacus   muscle. Degenerative changes of the thoracolumbar spine.    IMPRESSION:     Bilateral multifocal airspace opacities predominantly in dependent areas.   Multifocal pneumonia can have this appearance. However in the trauma  setting, these findings are likely indicating pulmonary contusion with   atelectasis.    Acute left transcervical left femoral neck fracture with mild   superolateral displacement of the distal fracture fragment.     These findings were discussed by Dr. Bah with Dr. Keller at   3/14/2019 4:08 AM with read back confirmation.    < end of copied text >

## 2019-03-19 NOTE — PHYSICAL THERAPY INITIAL EVALUATION ADULT - PATIENT PROFILE REVIEW, REHAB EVAL
No Formal Activity Order in the computer; spoke with RN Shannen Scales prior to PT evaluation--> Pt OK for PT consult/yes

## 2019-03-20 LAB
ANION GAP SERPL CALC-SCNC: 11 MMO/L — SIGNIFICANT CHANGE UP (ref 7–14)
BUN SERPL-MCNC: 47 MG/DL — HIGH (ref 7–23)
CALCIUM SERPL-MCNC: 8.4 MG/DL — SIGNIFICANT CHANGE UP (ref 8.4–10.5)
CHLORIDE SERPL-SCNC: 110 MMOL/L — HIGH (ref 98–107)
CO2 SERPL-SCNC: 23 MMOL/L — SIGNIFICANT CHANGE UP (ref 22–31)
CREAT SERPL-MCNC: 2.2 MG/DL — HIGH (ref 0.5–1.3)
GLUCOSE BLDC GLUCOMTR-MCNC: 133 MG/DL — HIGH (ref 70–99)
GLUCOSE BLDC GLUCOMTR-MCNC: 145 MG/DL — HIGH (ref 70–99)
GLUCOSE BLDC GLUCOMTR-MCNC: 195 MG/DL — HIGH (ref 70–99)
GLUCOSE BLDC GLUCOMTR-MCNC: 84 MG/DL — SIGNIFICANT CHANGE UP (ref 70–99)
GLUCOSE SERPL-MCNC: 155 MG/DL — HIGH (ref 70–99)
HCT VFR BLD CALC: 36.1 % — LOW (ref 39–50)
HGB BLD-MCNC: 11.7 G/DL — LOW (ref 13–17)
MAGNESIUM SERPL-MCNC: 2.4 MG/DL — SIGNIFICANT CHANGE UP (ref 1.6–2.6)
MCHC RBC-ENTMCNC: 30.5 PG — SIGNIFICANT CHANGE UP (ref 27–34)
MCHC RBC-ENTMCNC: 32.4 % — SIGNIFICANT CHANGE UP (ref 32–36)
MCV RBC AUTO: 94 FL — SIGNIFICANT CHANGE UP (ref 80–100)
NRBC # FLD: 0 K/UL — LOW (ref 25–125)
PHOSPHATE SERPL-MCNC: 3.6 MG/DL — SIGNIFICANT CHANGE UP (ref 2.5–4.5)
PLATELET # BLD AUTO: 176 K/UL — SIGNIFICANT CHANGE UP (ref 150–400)
PMV BLD: 9.8 FL — SIGNIFICANT CHANGE UP (ref 7–13)
POTASSIUM SERPL-MCNC: 4.1 MMOL/L — SIGNIFICANT CHANGE UP (ref 3.5–5.3)
POTASSIUM SERPL-SCNC: 4.1 MMOL/L — SIGNIFICANT CHANGE UP (ref 3.5–5.3)
RBC # BLD: 3.84 M/UL — LOW (ref 4.2–5.8)
RBC # FLD: 13.2 % — SIGNIFICANT CHANGE UP (ref 10.3–14.5)
SODIUM SERPL-SCNC: 144 MMOL/L — SIGNIFICANT CHANGE UP (ref 135–145)
VANCOMYCIN TROUGH SERPL-MCNC: 15 UG/ML — SIGNIFICANT CHANGE UP (ref 10–20)
WBC # BLD: 4.86 K/UL — SIGNIFICANT CHANGE UP (ref 3.8–10.5)
WBC # FLD AUTO: 4.86 K/UL — SIGNIFICANT CHANGE UP (ref 3.8–10.5)

## 2019-03-20 PROCEDURE — 99232 SBSQ HOSP IP/OBS MODERATE 35: CPT | Mod: GC

## 2019-03-20 PROCEDURE — 99233 SBSQ HOSP IP/OBS HIGH 50: CPT

## 2019-03-20 PROCEDURE — 71045 X-RAY EXAM CHEST 1 VIEW: CPT | Mod: 26

## 2019-03-20 RX ADMIN — SODIUM CHLORIDE 4 MILLILITER(S): 9 INJECTION INTRAMUSCULAR; INTRAVENOUS; SUBCUTANEOUS at 16:13

## 2019-03-20 RX ADMIN — SODIUM CHLORIDE 4 MILLILITER(S): 9 INJECTION INTRAMUSCULAR; INTRAVENOUS; SUBCUTANEOUS at 22:37

## 2019-03-20 RX ADMIN — HEPARIN SODIUM 5000 UNIT(S): 5000 INJECTION INTRAVENOUS; SUBCUTANEOUS at 13:53

## 2019-03-20 RX ADMIN — Medication 3 MILLILITER(S): at 04:01

## 2019-03-20 RX ADMIN — INSULIN GLARGINE 15 UNIT(S): 100 INJECTION, SOLUTION SUBCUTANEOUS at 21:39

## 2019-03-20 RX ADMIN — Medication 4 UNIT(S): at 12:25

## 2019-03-20 RX ADMIN — SODIUM CHLORIDE 4 MILLILITER(S): 9 INJECTION INTRAMUSCULAR; INTRAVENOUS; SUBCUTANEOUS at 11:09

## 2019-03-20 RX ADMIN — HEPARIN SODIUM 5000 UNIT(S): 5000 INJECTION INTRAVENOUS; SUBCUTANEOUS at 21:40

## 2019-03-20 RX ADMIN — SODIUM CHLORIDE 4 MILLILITER(S): 9 INJECTION INTRAMUSCULAR; INTRAVENOUS; SUBCUTANEOUS at 04:01

## 2019-03-20 RX ADMIN — Medication 40 MILLIGRAM(S): at 06:24

## 2019-03-20 RX ADMIN — Medication 2: at 12:25

## 2019-03-20 RX ADMIN — Medication 3 MILLILITER(S): at 11:08

## 2019-03-20 RX ADMIN — Medication 4 UNIT(S): at 08:50

## 2019-03-20 RX ADMIN — Medication 3 MILLILITER(S): at 16:13

## 2019-03-20 RX ADMIN — HEPARIN SODIUM 5000 UNIT(S): 5000 INJECTION INTRAVENOUS; SUBCUTANEOUS at 06:24

## 2019-03-20 RX ADMIN — Medication 3 MILLILITER(S): at 22:36

## 2019-03-20 RX ADMIN — PIPERACILLIN AND TAZOBACTAM 25 GRAM(S): 4; .5 INJECTION, POWDER, LYOPHILIZED, FOR SOLUTION INTRAVENOUS at 13:53

## 2019-03-20 RX ADMIN — Medication 4 UNIT(S): at 17:20

## 2019-03-20 NOTE — PROGRESS NOTE ADULT - ASSESSMENT
Assessment and Plan:   Pt is a 89yo [place]-speaking M with PMHx of DM, hard of hearing presenting for presented with AMS and respiratory distress following an unwitnessed fall, found to have L femoral neck fracture, sepsis likely 2/2 multifocal PNA, and acute Hypoxic and hypercarbic respiratory failure; improved on BIPAP and abx    #Acute Hypoxic and Hypercarbic Respiratory Failure  - respiratory distress, coarse rhonchi, CT showing multifocal PNA   - c/w vanc by level / zosyn X 7 days  - standing duonebs   - Chest PT as pt can tolerate  - congestion on CXR, s/p lasix 40 mg IV X 3, still overloaded. Echo showed moderate diastolic dysfunction and severe pulm HTN   -Lasix 40mg IVP daily, consider change to Lasix 40mg PO daily tomorrow.  -Pulmonary consult appreciated  -Cardiology consult appreciated.    #Sepsis  2/2 PNA  blood cultures negative  c/w abx as above    #Hyperglycemia  c/w lantus, ISS     #HAVEN  - initial Cr .86, trending up, stable today after IV diuresis  - likely prerenal, possible contrast induced nephropathy from CT   - making urine, strict I&Os  - urine lytes   - renally dose meds   -Renal input appreciated.    # Confusion  - improving with BIPAP, 2/2 from hypercarbia  - CTH negative for acute pathology     #L femoral neck fracture  - ortho appreciate recs: Spoke to ortho consult, Ortho is leaning toward elective hemiarthroplasty when pt is medically stable. No plan for urgent procedure at this time as per Ortho.   - tylenol for pain control     #prophylactic measure  - DVT - HSQ (consider lovenox given fracture, however HAVEN)  - Advanced Directive: son at bedside, extensive conversation regarding current critical medical illness, son understood, DNR/DNI, MOLST in chart

## 2019-03-20 NOTE — PROGRESS NOTE ADULT - ASSESSMENT
87yo [place]-speaking M with PMHx of DM, hard of hearing, DVT in the past, skin cancer (s/p excision) presenting with AMS and respiratory distress following an unwitnessed fall yesterday.  Per family has had multiple recent falls. presented with severe sepsis, rhabdo, HAVEN, PNA, and was place on bipap for distress and hypercapnia. Complicated by developing pulmonary edema, now improved.   Echo notable for pulmonary hypertension likely group 2 in the setting of Moderate diastolic disease.   - Diureses and CHF management as per primary team/cardiology       Patient is currently optimized from a pulmonary procedure, he does not appear to have underlying lung disease, and his pneumonia and pulmoanry edema have.    Based on [facility] SCORE pt is an intermediate risk for procedure.  But appears to be currently optimized from a pulmonary stand point for necessary procedure.     Jersey Beal MD PGY6  Pulmonary and Critical Care Fellow  p# [phone]

## 2019-03-20 NOTE — PROGRESS NOTE ADULT - SUBJECTIVE AND OBJECTIVE BOX
Patient is a 88y old  Male who presents with a chief complaint of fall (19 Mar 2019 18:45)      SUBJECTIVE / OVERNIGHT EVENTS: No complaints.    MEDICATIONS  (STANDING):  ALBUTerol/ipratropium for Nebulization 3 milliLiter(s) Nebulizer every 6 hours  dextrose 5%. 1000 milliLiter(s) (50 mL/Hr) IV Continuous <Continuous>  dextrose 50% Injectable 12.5 Gram(s) IV Push once  dextrose 50% Injectable 25 Gram(s) IV Push once  dextrose 50% Injectable 25 Gram(s) IV Push once  furosemide   Injectable 40 milliGRAM(s) IV Push daily  heparin  Injectable 5000 Unit(s) SubCutaneous every 8 hours  insulin glargine Injectable (LANTUS) 15 Unit(s) SubCutaneous at bedtime  insulin lispro (HumaLOG) corrective regimen sliding scale   SubCutaneous three times a day before meals  insulin lispro (HumaLOG) corrective regimen sliding scale   SubCutaneous at bedtime  insulin lispro Injectable (HumaLOG) 4 Unit(s) SubCutaneous three times a day before meals  piperacillin/tazobactam IVPB. 3.375 Gram(s) IV Intermittent every 12 hours  sodium chloride 3%  Inhalation 4 milliLiter(s) Inhalation every 6 hours    MEDICATIONS  (PRN):  acetaminophen   Tablet .. 650 milliGRAM(s) Oral every 6 hours PRN Mild Pain (1 - 3), Moderate Pain (4 - 6), Severe Pain (7 - 10)  acetaminophen   Tablet .. 650 milliGRAM(s) Oral every 6 hours PRN Temp greater or equal to 38C (100.4F)  acetaminophen  Suppository .. 650 milliGRAM(s) Rectal every 6 hours PRN Temp greater or equal to 38C (100.4F)  dextrose 40% Gel 15 Gram(s) Oral once PRN Blood Glucose LESS THAN 70 milliGRAM(s)/deciliter  glucagon  Injectable 1 milliGRAM(s) IntraMuscular once PRN Glucose LESS THAN 70 milligrams/deciliter  QUEtiapine 25 milliGRAM(s) Oral every 6 hours PRN agitation      Vital Signs Last 24 Hrs  T(C): 36.6 (20 Mar 2019 08:40), Max: 36.8 (19 Mar 2019 20:45)  T(F): 97.9 (20 Mar 2019 08:40), Max: 98.2 (19 Mar 2019 20:45)  HR: 95 (20 Mar 2019 08:40) (80 - 99)  BP: 133/85 (20 Mar 2019 08:40) (122/69 - 145/76)  BP(mean): --  RR: 17 (20 Mar 2019 08:40) (16 - 18)  SpO2: 100% (20 Mar 2019 08:40) (96% - 100%)  CAPILLARY BLOOD GLUCOSE      POCT Blood Glucose.: 145 mg/dL (20 Mar 2019 07:36)  POCT Blood Glucose.: 221 mg/dL (19 Mar 2019 23:18)  POCT Blood Glucose.: 197 mg/dL (19 Mar 2019 23:04)  POCT Blood Glucose.: 74 mg/dL (19 Mar 2019 22:30)  POCT Blood Glucose.: 67 mg/dL (19 Mar 2019 22:12)  POCT Blood Glucose.: 123 mg/dL (19 Mar 2019 16:29)  POCT Blood Glucose.: 213 mg/dL (19 Mar 2019 11:52)    I&O's Summary    19 Mar 2019 07:01  -  20 Mar 2019 07:00  --------------------------------------------------------  IN: 840 mL / OUT: 2100 mL / NET: -1260 mL        PHYSICAL EXAM:  GENERAL: NAD, well-developed  HEAD:  Atraumatic, Normocephalic  EYES: EOMI, PERRLA, conjunctiva and sclera clear  NECK: Supple, No JVD  CHEST/LUNG: Clear to auscultation bilaterally; No wheeze  HEART: Regular rate and rhythm; No murmurs, rubs, or gallops  ABDOMEN: Soft, Nontender, Nondistended; Bowel sounds present  EXTREMITIES:  2+ Peripheral Pulses, No clubbing, cyanosis, or edema  PSYCH: AAOx1  NEUROLOGY: non-focal  SKIN: No rashes or lesions    LABS:                        11.7   4.86  )-----------( 176      ( 20 Mar 2019 06:51 )             36.1     03-20    144  |  110<H>  |  47<H>  ----------------------------<  155<H>  4.1   |  23  |  2.20<H>    Ca    8.4      20 Mar 2019 06:51  Phos  3.6     03-20  Mg     2.4     -20    TPro  5.8<L>  /  Alb  2.6<L>  /  TBili  0.7  /  DBili  x   /  AST  65<H>  /  ALT  53<H>  /  AlkPhos  61  03-19    PT/INR - ( 19 Mar 2019 06:30 )   PT: 13.3 SEC;   INR: 1.16                Urinalysis Basic - ( 19 Mar 2019 12:00 )    Color: LIGHT YELLOW / Appearance: Lt TURBID / S.013 / pH: 6.0  Gluc: 150 / Ketone: NEGATIVE  / Bili: NEGATIVE / Urobili: NORMAL   Blood: MODERATE / Protein: 30 / Nitrite: NEGATIVE   Leuk Esterase: NEGATIVE / RBC: 26-50 / WBC 3-5   Sq Epi: OCC / Non Sq Epi: x / Bacteria: NEGATIVE        RADIOLOGY & ADDITIONAL TESTS:    Imaging Personally Reviewed:    Consultant(s) Notes Reviewed:      Care Discussed with Consultants/Other Providers:

## 2019-03-20 NOTE — PROGRESS NOTE ADULT - ASSESSMENT
Pulmonary HTN, elevated right sided pressures   as per pulm no evidence of PNA  echo reviewed  Low LV volume, LVH pos septal bounce , small effusion,   diastolic dysfunction   agree with lasix     HAVEN  improving   follow up with renal     DM  Monitor finger stick. Insulin coverage. Diabetic education and Diabetic diet. Consider nutrition consultation.    elevated trop  stress induced in setting of acute CHF

## 2019-03-20 NOTE — PROGRESS NOTE ADULT - ATTENDING COMMENTS
pt without evidence of hypercapnia. admitted for tachypnea and was placed on bilevel for work of breathing, not hypercapnia. does not have any underlying, known lung disease. Pt has left femoral neck fracture and will need hemiarthroplasty. he is optimized from pulmonary standpoint for anesthesia and has moderate risk for pulmonary complications, mainly from HFpEF and potential pulmonary edema. may benefit from noninvasive ventilation post-operatively.

## 2019-03-20 NOTE — PROGRESS NOTE ADULT - SUBJECTIVE AND OBJECTIVE BOX
CHIEF COMPLAINT:    Interval Events:    REVIEW OF SYSTEMS:  Constitutional: [ ] negative [ ] fevers [ ] chills [ ] weight loss [ ] weight gain  HEENT: [ ] negative [ ] dry eyes [ ] eye irritation [ ] postnasal drip [ ] nasal congestion  CV: [ ] negative  [ ] chest pain [ ] orthopnea [ ] palpitations [ ] murmur  Resp: [ ] negative [ ] cough [ ] shortness of breath [ ] dyspnea [ ] wheezing [ ] sputum [ ] hemoptysis  GI: [ ] negative [ ] nausea [ ] vomiting [ ] diarrhea [ ] constipation [ ] abd pain [ ] dysphagia   : [ ] negative [ ] dysuria [ ] nocturia [ ] hematuria [ ] increased urinary frequency  Musculoskeletal: [ ] negative [ ] back pain [ ] myalgias [ ] arthralgias [ ] fracture  Skin: [ ] negative [ ] rash [ ] itch  Neurological: [ ] negative [ ] headache [ ] dizziness [ ] syncope [ ] weakness [ ] numbness  Psychiatric: [ ] negative [ ] anxiety [ ] depression  Endocrine: [ ] negative [ ] diabetes [ ] thyroid problem  Hematologic/Lymphatic: [ ] negative [ ] anemia [ ] bleeding problem  Allergic/Immunologic: [ ] negative [ ] itchy eyes [ ] nasal discharge [ ] hives [ ] angioedema  [ ] All other systems negative  [ ] Unable to assess ROS because ________    OBJECTIVE:  ICU Vital Signs Last 24 Hrs  T(C): 36.6 (20 Mar 2019 12:40), Max: 36.8 (19 Mar 2019 20:45)  T(F): 97.9 (20 Mar 2019 12:40), Max: 98.2 (19 Mar 2019 20:45)  HR: 92 (20 Mar 2019 12:40) (85 - 98)  BP: 130/86 (20 Mar 2019 12:40) (123/81 - 145/76)  BP(mean): --  ABP: --  ABP(mean): --  RR: 18 (20 Mar 2019 12:40) (16 - 18)  SpO2: 100% (20 Mar 2019 12:40) (96% - 100%)         @ 07:01  -  -20 @ 07:00  --------------------------------------------------------  IN: 840 mL / OUT: 2100 mL / NET: -1260 mL     @ 07:01  -   @ 15:50  --------------------------------------------------------  IN: 0 mL / OUT: 1000 mL / NET: -1000 mL      CAPILLARY BLOOD GLUCOSE      POCT Blood Glucose.: 195 mg/dL (20 Mar 2019 11:39)      PHYSICAL EXAM:  General:   HEENT:   Lymph Nodes:  Neck:   Respiratory:   Cardiovascular:   Abdomen:   Extremities:   Skin:   Neurological:  Psychiatry:    HOSPITAL MEDICATIONS:  Standing Meds:  ALBUTerol/ipratropium for Nebulization 3 milliLiter(s) Nebulizer every 6 hours  dextrose 5%. 1000 milliLiter(s) IV Continuous <Continuous>  dextrose 50% Injectable 12.5 Gram(s) IV Push once  dextrose 50% Injectable 25 Gram(s) IV Push once  dextrose 50% Injectable 25 Gram(s) IV Push once  furosemide   Injectable 40 milliGRAM(s) IV Push daily  heparin  Injectable 5000 Unit(s) SubCutaneous every 8 hours  insulin glargine Injectable (LANTUS) 15 Unit(s) SubCutaneous at bedtime  insulin lispro (HumaLOG) corrective regimen sliding scale   SubCutaneous three times a day before meals  insulin lispro (HumaLOG) corrective regimen sliding scale   SubCutaneous at bedtime  insulin lispro Injectable (HumaLOG) 4 Unit(s) SubCutaneous three times a day before meals  piperacillin/tazobactam IVPB. 3.375 Gram(s) IV Intermittent every 12 hours  sodium chloride 3%  Inhalation 4 milliLiter(s) Inhalation every 6 hours      PRN Meds:  acetaminophen   Tablet .. 650 milliGRAM(s) Oral every 6 hours PRN  acetaminophen   Tablet .. 650 milliGRAM(s) Oral every 6 hours PRN  acetaminophen  Suppository .. 650 milliGRAM(s) Rectal every 6 hours PRN  dextrose 40% Gel 15 Gram(s) Oral once PRN  glucagon  Injectable 1 milliGRAM(s) IntraMuscular once PRN  QUEtiapine 25 milliGRAM(s) Oral every 6 hours PRN      LABS:                        11.7   4.86  )-----------( 176      ( 20 Mar 2019 06:51 )             36.1     Hgb Trend: 11.7<--, 11.4<--, 12.8<--, 12.1<--, 13.2<--  03-20    144  |  110<H>  |  47<H>  ----------------------------<  155<H>  4.1   |  23  |  2.20<H>    Ca    8.4      20 Mar 2019 06:51  Phos  3.6       Mg     2.4         TPro  5.8<L>  /  Alb  2.6<L>  /  TBili  0.7  /  DBili  x   /  AST  65<H>  /  ALT  53<H>  /  AlkPhos  61      Creatinine Trend: 2.20<--, 2.29<--, 2.44<--, 2.38<--, 2.33<--, 2.14<--  PT/INR - ( 19 Mar 2019 06:30 )   PT: 13.3 SEC;   INR: 1.16            Urinalysis Basic - ( 19 Mar 2019 12:00 )    Color: LIGHT YELLOW / Appearance: Lt TURBID / S.013 / pH: 6.0  Gluc: 150 / Ketone: NEGATIVE  / Bili: NEGATIVE / Urobili: NORMAL   Blood: MODERATE / Protein: 30 / Nitrite: NEGATIVE   Leuk Esterase: NEGATIVE / RBC: 26-50 / WBC 3-5   Sq Epi: OCC / Non Sq Epi: x / Bacteria: NEGATIVE            MICROBIOLOGY:     RADIOLOGY:  [ ] Reviewed and interpreted by me    PULMONARY FUNCTION TESTS:    EKG: CHIEF COMPLAINT: Leg pain.     Interval Events:    REVIEW OF SYSTEMS:\  [ ] All other systems negative  [x] Unable to assess ROS because; [place] unable to use .     OBJECTIVE:  ICU Vital Signs Last 24 Hrs  T(C): 36.6 (20 Mar 2019 12:40), Max: 36.8 (19 Mar 2019 20:45)  T(F): 97.9 (20 Mar 2019 12:40), Max: 98.2 (19 Mar 2019 20:45)  HR: 92 (20 Mar 2019 12:40) (85 - 98)  BP: 130/86 (20 Mar 2019 12:40) (123/81 - 145/76)  BP(mean): --  ABP: --  ABP(mean): --  RR: 18 (20 Mar 2019 12:40) (16 - 18)  SpO2: 100% (20 Mar 2019 12:40) (96% - 100%)         @ 07:01  -   @ 07:00  --------------------------------------------------------  IN: 840 mL / OUT: 2100 mL / NET: -1260 mL     @ 07: @ 15:50  --------------------------------------------------------  IN: 0 mL / OUT: 1000 mL / NET: -1000 mL      CAPILLARY BLOOD GLUCOSE      POCT Blood Glucose.: 195 mg/dL (20 Mar 2019 11:39)      PHYSICAL EXAM:  General: Asleep on room air with sat 99%Awake, alert, not responding to questions likely due to [place]  HEENT: Unable to asses, not openming mouth  Neck: No JVD.  Respiratory: INspiratoty rales on left, but Pt is not siting up..  Cardiovascular: S1 S2 normal. No murmurs, rubs or gallops.   Abdomen: Soft, non-tender, non-distended. No organomegaly.  Extremities: Warm to touch. Peripheral pulse palpable. No pedal edema.   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   Neurological: not following comands  Psychiatry: Appropriate mood and affect.     HOSPITAL MEDICATIONS:  Standing Meds:  ALBUTerol/ipratropium for Nebulization 3 milliLiter(s) Nebulizer every 6 hours  dextrose 5%. 1000 milliLiter(s) IV Continuous <Continuous>  dextrose 50% Injectable 12.5 Gram(s) IV Push once  dextrose 50% Injectable 25 Gram(s) IV Push once  dextrose 50% Injectable 25 Gram(s) IV Push once  furosemide   Injectable 40 milliGRAM(s) IV Push daily  heparin  Injectable 5000 Unit(s) SubCutaneous every 8 hours  insulin glargine Injectable (LANTUS) 15 Unit(s) SubCutaneous at bedtime  insulin lispro (HumaLOG) corrective regimen sliding scale   SubCutaneous three times a day before meals  insulin lispro (HumaLOG) corrective regimen sliding scale   SubCutaneous at bedtime  insulin lispro Injectable (HumaLOG) 4 Unit(s) SubCutaneous three times a day before meals  piperacillin/tazobactam IVPB. 3.375 Gram(s) IV Intermittent every 12 hours  sodium chloride 3%  Inhalation 4 milliLiter(s) Inhalation every 6 hours      PRN Meds:  acetaminophen   Tablet .. 650 milliGRAM(s) Oral every 6 hours PRN  acetaminophen   Tablet .. 650 milliGRAM(s) Oral every 6 hours PRN  acetaminophen  Suppository .. 650 milliGRAM(s) Rectal every 6 hours PRN  dextrose 40% Gel 15 Gram(s) Oral once PRN  glucagon  Injectable 1 milliGRAM(s) IntraMuscular once PRN  QUEtiapine 25 milliGRAM(s) Oral every 6 hours PRN      LABS:                        11.7   4.86  )-----------( 176      ( 20 Mar 2019 06:51 )             36.1     Hgb Trend: 11.7<--, 11.4<--, 12.8<--, 12.1<--, 13.2<--  03-20    144  |  110<H>  |  47<H>  ----------------------------<  155<H>  4.1   |  23  |  2.20<H>    Ca    8.4      20 Mar 2019 06:51  Phos  3.6     03-20  Mg     2.4     03-20    TPro  5.8<L>  /  Alb  2.6<L>  /  TBili  0.7  /  DBili  x   /  AST  65<H>  /  ALT  53<H>  /  AlkPhos  61      Creatinine Trend: 2.20<--, 2.29<--, 2.44<--, 2.38<--, 2.33<--, 2.14<--  PT/INR - ( 19 Mar 2019 06:30 )   PT: 13.3 SEC;   INR: 1.16            Urinalysis Basic - ( 19 Mar 2019 12:00 )    Color: LIGHT YELLOW / Appearance: Lt TURBID / S.013 / pH: 6.0  Gluc: 150 / Ketone: NEGATIVE  / Bili: NEGATIVE / Urobili: NORMAL   Blood: MODERATE / Protein: 30 / Nitrite: NEGATIVE   Leuk Esterase: NEGATIVE / RBC: 26-50 / WBC 3-5   Sq Epi: OCC / Non Sq Epi: x / Bacteria: NEGATIVE            MICROBIOLOGY:     RADIOLOGY:  [ ] Reviewed and interpreted by me    PULMONARY FUNCTION TESTS:    EKG:

## 2019-03-20 NOTE — CHART NOTE - NSCHARTNOTEFT_GEN_A_CORE
Hospitalist, Dr. Rangel called inquiring about surgical planning for patient. States family would like to discuss. Spoke with son, HCP, Shaylee [phone], who would like to discuss surgical options. Per ortho resident Dr. Moe and Dr. Shabazz, patient's son agreed to nonoperative planning earlier in the week.   Per Dr. Rangel, patient will be not optimized for surgery until early next week.  Plan for family meeting with medicine, ortho team and HCP tomorrow 3/21/19 at 2:30 pm to discuss risks of nonop vs. operative management. Hospitalist, Dr. Rangel called inquiring about surgical planning for patient. States family would like to discuss. Spoke with son, HCP, Shaylee [phone], who would like to discuss surgical options. Per ortho resident Dr. Moe and Dr. Shabazz, patient's son agreed to nonoperative planning earlier in the week.   Per Dr. Rangel, patient will be not optimized for surgery until early next week as patient is high risk for surgery given comorbidities.  Plan for family meeting with medicine, ortho team and HCP tomorrow 3/21/19 at 2:30 pm to discuss risks of nonop vs. operative management. Hospitalist, Dr. Rangel called inquiring about surgical planning for patient. States family would like to discuss. Spoke with son, HCP, Shaylee [phone], who would like to discuss surgical options. Per ortho resident Dr. Moe and Dr. Shabazz, patient's son stated preference for nonoperative planning earlier in the week.   Per Dr. Rangel, patient will be not optimized for surgery until early next week as patient is high risk for surgery given comorbidities.  Plan for family meeting with medicine, ortho team and HCP tomorrow 3/21/19 at 2:30 pm to discuss risks of nonop vs. operative management.              I agree with the above note on this patient. All pertinent films have been reviewed. Please refer to clinical documentation of the history, physical examinations, data summary, and both assessment and plan as documented above and with which I agree.    patient's family has been wavering between operative and nonoperative treatment options over past week. per medicine team patient is high risk for surgery given his current and past medical issues.     Abdiel Shabazz MD  Attending Orthopedic Surgeon

## 2019-03-20 NOTE — PROGRESS NOTE ADULT - SUBJECTIVE AND OBJECTIVE BOX
Subjective: Patient seen and examined. No new events except as noted.     SUBJECTIVE/ROS:  resting       MEDICATIONS:  MEDICATIONS  (STANDING):  ALBUTerol/ipratropium for Nebulization 3 milliLiter(s) Nebulizer every 6 hours  dextrose 5%. 1000 milliLiter(s) (50 mL/Hr) IV Continuous <Continuous>  dextrose 50% Injectable 12.5 Gram(s) IV Push once  dextrose 50% Injectable 25 Gram(s) IV Push once  dextrose 50% Injectable 25 Gram(s) IV Push once  furosemide   Injectable 40 milliGRAM(s) IV Push daily  heparin  Injectable 5000 Unit(s) SubCutaneous every 8 hours  insulin glargine Injectable (LANTUS) 15 Unit(s) SubCutaneous at bedtime  insulin lispro (HumaLOG) corrective regimen sliding scale   SubCutaneous three times a day before meals  insulin lispro (HumaLOG) corrective regimen sliding scale   SubCutaneous at bedtime  insulin lispro Injectable (HumaLOG) 4 Unit(s) SubCutaneous three times a day before meals  piperacillin/tazobactam IVPB. 3.375 Gram(s) IV Intermittent every 12 hours  sodium chloride 3%  Inhalation 4 milliLiter(s) Inhalation every 6 hours      PHYSICAL EXAM:  T(C): 36.6 (03-20-19 @ 12:40), Max: 36.8 (03-19-19 @ 20:45)  HR: 92 (03-20-19 @ 12:40) (85 - 98)  BP: 130/86 (03-20-19 @ 12:40) (123/81 - 145/76)  RR: 18 (03-20-19 @ 12:40) (16 - 18)  SpO2: 100% (03-20-19 @ 12:40) (96% - 100%)  Wt(kg): --  I&O's Summary    19 Mar 2019 07:01  -  20 Mar 2019 07:00  --------------------------------------------------------  IN: 840 mL / OUT: 2100 mL / NET: -1260 mL    20 Mar 2019 07:01  -  20 Mar 2019 15:02  --------------------------------------------------------  IN: 0 mL / OUT: 1000 mL / NET: -1000 mL            JVP: Normal  Neck: supple  Lung: crackles   CV: S1 S2 , Murmur:  Abd: soft  Ext: No edema  neuro: Awake / alert  Psych: flat affect  Skin: normal``    LABS/DATA:    CARDIAC MARKERS:                                11.7   4.86  )-----------( 176      ( 20 Mar 2019 06:51 )             36.1     03-20    144  |  110<H>  |  47<H>  ----------------------------<  155<H>  4.1   |  23  |  2.20<H>    Ca    8.4      20 Mar 2019 06:51  Phos  3.6     03-20  Mg     2.4     03-20    TPro  5.8<L>  /  Alb  2.6<L>  /  TBili  0.7  /  DBili  x   /  AST  65<H>  /  ALT  53<H>  /  AlkPhos  61  03-19    proBNP:   Lipid Profile:   HgA1c:   TSH:     TELE:  EKG:

## 2019-03-21 ENCOUNTER — TRANSCRIPTION ENCOUNTER (OUTPATIENT)
Age: 84
End: 2019-03-21

## 2019-03-21 DIAGNOSIS — E87.0 HYPEROSMOLALITY AND HYPERNATREMIA: ICD-10-CM

## 2019-03-21 DIAGNOSIS — S72.002A FRACTURE OF UNSPECIFIED PART OF NECK OF LEFT FEMUR, INITIAL ENCOUNTER FOR CLOSED FRACTURE: ICD-10-CM

## 2019-03-21 LAB
ANION GAP SERPL CALC-SCNC: 15 MMO/L — HIGH (ref 7–14)
ANION GAP SERPL CALC-SCNC: 15 MMO/L — HIGH (ref 7–14)
BASOPHILS # BLD AUTO: 0.01 K/UL — SIGNIFICANT CHANGE UP (ref 0–0.2)
BASOPHILS NFR BLD AUTO: 0.2 % — SIGNIFICANT CHANGE UP (ref 0–2)
BUN SERPL-MCNC: 48 MG/DL — HIGH (ref 7–23)
BUN SERPL-MCNC: 48 MG/DL — HIGH (ref 7–23)
CALCIUM SERPL-MCNC: 9.2 MG/DL — SIGNIFICANT CHANGE UP (ref 8.4–10.5)
CALCIUM SERPL-MCNC: 9.2 MG/DL — SIGNIFICANT CHANGE UP (ref 8.4–10.5)
CHLORIDE SERPL-SCNC: 111 MMOL/L — HIGH (ref 98–107)
CHLORIDE SERPL-SCNC: 111 MMOL/L — HIGH (ref 98–107)
CO2 SERPL-SCNC: 23 MMOL/L — SIGNIFICANT CHANGE UP (ref 22–31)
CO2 SERPL-SCNC: 23 MMOL/L — SIGNIFICANT CHANGE UP (ref 22–31)
CREAT SERPL-MCNC: 2.06 MG/DL — HIGH (ref 0.5–1.3)
CREAT SERPL-MCNC: 2.06 MG/DL — HIGH (ref 0.5–1.3)
EOSINOPHIL # BLD AUTO: 0.19 K/UL — SIGNIFICANT CHANGE UP (ref 0–0.5)
EOSINOPHIL NFR BLD AUTO: 3.8 % — SIGNIFICANT CHANGE UP (ref 0–6)
GLUCOSE BLDC GLUCOMTR-MCNC: 100 MG/DL — HIGH (ref 70–99)
GLUCOSE BLDC GLUCOMTR-MCNC: 80 MG/DL — SIGNIFICANT CHANGE UP (ref 70–99)
GLUCOSE BLDC GLUCOMTR-MCNC: 81 MG/DL — SIGNIFICANT CHANGE UP (ref 70–99)
GLUCOSE BLDC GLUCOMTR-MCNC: 83 MG/DL — SIGNIFICANT CHANGE UP (ref 70–99)
GLUCOSE SERPL-MCNC: 94 MG/DL — SIGNIFICANT CHANGE UP (ref 70–99)
GLUCOSE SERPL-MCNC: 94 MG/DL — SIGNIFICANT CHANGE UP (ref 70–99)
HCT VFR BLD CALC: 40.4 % — SIGNIFICANT CHANGE UP (ref 39–50)
HCT VFR BLD CALC: 40.4 % — SIGNIFICANT CHANGE UP (ref 39–50)
HGB BLD-MCNC: 12.8 G/DL — LOW (ref 13–17)
HGB BLD-MCNC: 12.8 G/DL — LOW (ref 13–17)
IMM GRANULOCYTES NFR BLD AUTO: 0.4 % — SIGNIFICANT CHANGE UP (ref 0–1.5)
LYMPHOCYTES # BLD AUTO: 0.91 K/UL — LOW (ref 1–3.3)
LYMPHOCYTES # BLD AUTO: 18.1 % — SIGNIFICANT CHANGE UP (ref 13–44)
MAGNESIUM SERPL-MCNC: 2.6 MG/DL — SIGNIFICANT CHANGE UP (ref 1.6–2.6)
MCHC RBC-ENTMCNC: 30.3 PG — SIGNIFICANT CHANGE UP (ref 27–34)
MCHC RBC-ENTMCNC: 30.3 PG — SIGNIFICANT CHANGE UP (ref 27–34)
MCHC RBC-ENTMCNC: 31.7 % — LOW (ref 32–36)
MCHC RBC-ENTMCNC: 31.7 % — LOW (ref 32–36)
MCV RBC AUTO: 95.5 FL — SIGNIFICANT CHANGE UP (ref 80–100)
MCV RBC AUTO: 95.5 FL — SIGNIFICANT CHANGE UP (ref 80–100)
MONOCYTES # BLD AUTO: 0.31 K/UL — SIGNIFICANT CHANGE UP (ref 0–0.9)
MONOCYTES NFR BLD AUTO: 6.2 % — SIGNIFICANT CHANGE UP (ref 2–14)
NEUTROPHILS # BLD AUTO: 3.58 K/UL — SIGNIFICANT CHANGE UP (ref 1.8–7.4)
NEUTROPHILS NFR BLD AUTO: 71.3 % — SIGNIFICANT CHANGE UP (ref 43–77)
NRBC # FLD: 0 K/UL — LOW (ref 25–125)
NRBC # FLD: 0 K/UL — LOW (ref 25–125)
PHOSPHATE SERPL-MCNC: 4.1 MG/DL — SIGNIFICANT CHANGE UP (ref 2.5–4.5)
PLATELET # BLD AUTO: 226 K/UL — SIGNIFICANT CHANGE UP (ref 150–400)
PLATELET # BLD AUTO: 226 K/UL — SIGNIFICANT CHANGE UP (ref 150–400)
PMV BLD: 9.6 FL — SIGNIFICANT CHANGE UP (ref 7–13)
PMV BLD: 9.6 FL — SIGNIFICANT CHANGE UP (ref 7–13)
POTASSIUM SERPL-MCNC: 4.4 MMOL/L — SIGNIFICANT CHANGE UP (ref 3.5–5.3)
POTASSIUM SERPL-MCNC: 4.4 MMOL/L — SIGNIFICANT CHANGE UP (ref 3.5–5.3)
POTASSIUM SERPL-SCNC: 4.4 MMOL/L — SIGNIFICANT CHANGE UP (ref 3.5–5.3)
POTASSIUM SERPL-SCNC: 4.4 MMOL/L — SIGNIFICANT CHANGE UP (ref 3.5–5.3)
RBC # BLD: 4.23 M/UL — SIGNIFICANT CHANGE UP (ref 4.2–5.8)
RBC # BLD: 4.23 M/UL — SIGNIFICANT CHANGE UP (ref 4.2–5.8)
RBC # FLD: 13.2 % — SIGNIFICANT CHANGE UP (ref 10.3–14.5)
RBC # FLD: 13.2 % — SIGNIFICANT CHANGE UP (ref 10.3–14.5)
SODIUM SERPL-SCNC: 149 MMOL/L — HIGH (ref 135–145)
SODIUM SERPL-SCNC: 149 MMOL/L — HIGH (ref 135–145)
VANCOMYCIN TROUGH SERPL-MCNC: 10.9 UG/ML — SIGNIFICANT CHANGE UP (ref 10–20)
WBC # BLD: 5.02 K/UL — SIGNIFICANT CHANGE UP (ref 3.8–10.5)
WBC # BLD: 5.02 K/UL — SIGNIFICANT CHANGE UP (ref 3.8–10.5)
WBC # FLD AUTO: 5.02 K/UL — SIGNIFICANT CHANGE UP (ref 3.8–10.5)
WBC # FLD AUTO: 5.02 K/UL — SIGNIFICANT CHANGE UP (ref 3.8–10.5)

## 2019-03-21 PROCEDURE — 99233 SBSQ HOSP IP/OBS HIGH 50: CPT

## 2019-03-21 PROCEDURE — 99497 ADVNCD CARE PLAN 30 MIN: CPT | Mod: 25

## 2019-03-21 PROCEDURE — 99233 SBSQ HOSP IP/OBS HIGH 50: CPT | Mod: GC

## 2019-03-21 RX ORDER — FUROSEMIDE 40 MG
40 TABLET ORAL DAILY
Qty: 0 | Refills: 0 | Status: DISCONTINUED | OUTPATIENT
Start: 2019-03-22 | End: 2019-03-29

## 2019-03-21 RX ORDER — CARVEDILOL PHOSPHATE 80 MG/1
3.12 CAPSULE, EXTENDED RELEASE ORAL EVERY 12 HOURS
Qty: 0 | Refills: 0 | Status: DISCONTINUED | OUTPATIENT
Start: 2019-03-21 | End: 2019-03-29

## 2019-03-21 RX ADMIN — PIPERACILLIN AND TAZOBACTAM 25 GRAM(S): 4; .5 INJECTION, POWDER, LYOPHILIZED, FOR SOLUTION INTRAVENOUS at 12:32

## 2019-03-21 RX ADMIN — HEPARIN SODIUM 5000 UNIT(S): 5000 INJECTION INTRAVENOUS; SUBCUTANEOUS at 21:43

## 2019-03-21 RX ADMIN — PIPERACILLIN AND TAZOBACTAM 25 GRAM(S): 4; .5 INJECTION, POWDER, LYOPHILIZED, FOR SOLUTION INTRAVENOUS at 01:09

## 2019-03-21 RX ADMIN — SODIUM CHLORIDE 4 MILLILITER(S): 9 INJECTION INTRAMUSCULAR; INTRAVENOUS; SUBCUTANEOUS at 15:43

## 2019-03-21 RX ADMIN — CARVEDILOL PHOSPHATE 3.12 MILLIGRAM(S): 80 CAPSULE, EXTENDED RELEASE ORAL at 17:49

## 2019-03-21 RX ADMIN — SODIUM CHLORIDE 4 MILLILITER(S): 9 INJECTION INTRAMUSCULAR; INTRAVENOUS; SUBCUTANEOUS at 04:52

## 2019-03-21 RX ADMIN — Medication 4 UNIT(S): at 08:32

## 2019-03-21 RX ADMIN — Medication 40 MILLIGRAM(S): at 06:13

## 2019-03-21 RX ADMIN — Medication 3 MILLILITER(S): at 09:34

## 2019-03-21 RX ADMIN — Medication 3 MILLILITER(S): at 22:06

## 2019-03-21 RX ADMIN — Medication 4 UNIT(S): at 12:32

## 2019-03-21 RX ADMIN — Medication 3 MILLILITER(S): at 04:50

## 2019-03-21 RX ADMIN — HEPARIN SODIUM 5000 UNIT(S): 5000 INJECTION INTRAVENOUS; SUBCUTANEOUS at 17:49

## 2019-03-21 RX ADMIN — Medication 4 UNIT(S): at 17:49

## 2019-03-21 RX ADMIN — SODIUM CHLORIDE 4 MILLILITER(S): 9 INJECTION INTRAMUSCULAR; INTRAVENOUS; SUBCUTANEOUS at 22:16

## 2019-03-21 RX ADMIN — SODIUM CHLORIDE 4 MILLILITER(S): 9 INJECTION INTRAMUSCULAR; INTRAVENOUS; SUBCUTANEOUS at 09:35

## 2019-03-21 RX ADMIN — INSULIN GLARGINE 15 UNIT(S): 100 INJECTION, SOLUTION SUBCUTANEOUS at 21:43

## 2019-03-21 RX ADMIN — HEPARIN SODIUM 5000 UNIT(S): 5000 INJECTION INTRAVENOUS; SUBCUTANEOUS at 06:13

## 2019-03-21 RX ADMIN — Medication 3 MILLILITER(S): at 15:43

## 2019-03-21 NOTE — DISCHARGE NOTE PROVIDER - NSDCFUADDINST_GEN_ALL_CORE_FT
Weight Bearing As Tolerated to Left Leg, ambulate using Rolling Walker. Weight Bearing As Tolerated to Left Leg, ambulate with assistance using Rolling Walker.

## 2019-03-21 NOTE — PROGRESS NOTE ADULT - SUBJECTIVE AND OBJECTIVE BOX
Patient is a 88y old  Male who presents with a chief complaint of fall (21 Mar 2019 09:17)      SUBJECTIVE / OVERNIGHT EVENTS: No complaints.     MEDICATIONS  (STANDING):  ALBUTerol/ipratropium for Nebulization 3 milliLiter(s) Nebulizer every 6 hours  carvedilol 3.125 milliGRAM(s) Oral every 12 hours  dextrose 5%. 1000 milliLiter(s) (50 mL/Hr) IV Continuous <Continuous>  dextrose 50% Injectable 12.5 Gram(s) IV Push once  dextrose 50% Injectable 25 Gram(s) IV Push once  dextrose 50% Injectable 25 Gram(s) IV Push once  furosemide   Injectable 40 milliGRAM(s) IV Push daily  heparin  Injectable 5000 Unit(s) SubCutaneous every 8 hours  insulin glargine Injectable (LANTUS) 15 Unit(s) SubCutaneous at bedtime  insulin lispro (HumaLOG) corrective regimen sliding scale   SubCutaneous three times a day before meals  insulin lispro (HumaLOG) corrective regimen sliding scale   SubCutaneous at bedtime  insulin lispro Injectable (HumaLOG) 4 Unit(s) SubCutaneous three times a day before meals  piperacillin/tazobactam IVPB. 3.375 Gram(s) IV Intermittent every 12 hours  sodium chloride 3%  Inhalation 4 milliLiter(s) Inhalation every 6 hours    MEDICATIONS  (PRN):  acetaminophen   Tablet .. 650 milliGRAM(s) Oral every 6 hours PRN Mild Pain (1 - 3), Moderate Pain (4 - 6), Severe Pain (7 - 10)  acetaminophen   Tablet .. 650 milliGRAM(s) Oral every 6 hours PRN Temp greater or equal to 38C (100.4F)  acetaminophen  Suppository .. 650 milliGRAM(s) Rectal every 6 hours PRN Temp greater or equal to 38C (100.4F)  dextrose 40% Gel 15 Gram(s) Oral once PRN Blood Glucose LESS THAN 70 milliGRAM(s)/deciliter  glucagon  Injectable 1 milliGRAM(s) IntraMuscular once PRN Glucose LESS THAN 70 milligrams/deciliter  QUEtiapine 25 milliGRAM(s) Oral every 6 hours PRN agitation      Vital Signs Last 24 Hrs  T(C): 36.6 (21 Mar 2019 08:20), Max: 36.7 (20 Mar 2019 16:20)  T(F): 97.9 (21 Mar 2019 08:20), Max: 98.1 (20 Mar 2019 16:20)  HR: 87 (21 Mar 2019 09:35) (84 - 102)  BP: 151/91 (21 Mar 2019 08:20) (130/86 - 151/91)  BP(mean): --  RR: 18 (21 Mar 2019 08:20) (18 - 19)  SpO2: 98% (21 Mar 2019 08:20) (96% - 100%)  CAPILLARY BLOOD GLUCOSE      POCT Blood Glucose.: 100 mg/dL (21 Mar 2019 07:42)  POCT Blood Glucose.: 133 mg/dL (20 Mar 2019 21:38)  POCT Blood Glucose.: 84 mg/dL (20 Mar 2019 16:33)  POCT Blood Glucose.: 195 mg/dL (20 Mar 2019 11:39)    I&O's Summary    20 Mar 2019 07:01  -  21 Mar 2019 07:00  --------------------------------------------------------  IN: 0 mL / OUT: 2500 mL / NET: -2500 mL        PHYSICAL EXAM:  GENERAL: NAD, well-developed  HEAD:  Atraumatic, Normocephalic  EYES: EOMI, PERRLA, conjunctiva and sclera clear  NECK: Supple, No JVD  CHEST/LUNG: Clear to auscultation bilaterally; No wheeze  HEART: Regular rate and rhythm; No murmurs, rubs, or gallops  ABDOMEN: Soft, Nontender, Nondistended; Bowel sounds present  EXTREMITIES:  2+ Peripheral Pulses, No clubbing, cyanosis, or edema  PSYCH: AAOx3  NEUROLOGY: non-focal  SKIN: No rashes or lesions    LABS:                        12.8   5.02  )-----------( 226      ( 21 Mar 2019 07:50 )             40.4     03-21    149<H>  |  111<H>  |  48<H>  ----------------------------<  94  4.4   |  23  |  2.06<H>    Ca    9.2      21 Mar 2019 07:50  Phos  4.1     03-21  Mg     2.6     03-21            Urinalysis Basic - ( 19 Mar 2019 12:00 )    Color: LIGHT YELLOW / Appearance: Lt TURBID / S.013 / pH: 6.0  Gluc: 150 / Ketone: NEGATIVE  / Bili: NEGATIVE / Urobili: NORMAL   Blood: MODERATE / Protein: 30 / Nitrite: NEGATIVE   Leuk Esterase: NEGATIVE / RBC: 26-50 / WBC 3-5   Sq Epi: OCC / Non Sq Epi: x / Bacteria: NEGATIVE        RADIOLOGY & ADDITIONAL TESTS:  < from: Xray Chest 1 View- PORTABLE-Urgent (19 @ 15:21) >    IMPRESSION:  Small ill-defined left upper lung opacity which could be due   to atelectasis and/or developing pneumonia.    No generalized pulmonary vascular congestion or pleural effusion seen    < end of copied text >  Imaging Personally Reviewed:    Consultant(s) Notes Reviewed:      Care Discussed with Consultants/Other Providers: Cardiology, will change Lasix to 40mg PO daily as per Card rec.

## 2019-03-21 NOTE — DISCHARGE NOTE PROVIDER - NSDCHHASSISTDEVIC_GEN_ALL_CORE_FT
Rolling Walker and Wheelchair (family to buy equipment due to no insurance) Rolling Walker, Electric Bed and Wheelchair (family to buy equipment due to no insurance)

## 2019-03-21 NOTE — DISCHARGE NOTE PROVIDER - PROVIDER TOKENS
FREE:[LAST:[Danoff],FIRST:[Abdiel],PHONE:[(   )    -],FAX:[(   )    -],ADDRESS:[Orthopedics  [address] [place] [address] [address]  [place] [address]  Phone: [phone]  Fax: [phone]  Follow Up Time: 2 weeks from surgery - Please make appt for 4/8/19 for staple removal]],FREE:[LAST:[Outpatient follow up [facility] endocrine clinic],PHONE:[(   )    -],FAX:[(   )    -],ADDRESS:[[phone]],FOLLOWUP:[2 weeks]],FREE:[LAST:[Follow up at [facility] for Coumadin/INR lab testing],PHONE:[(   )    -],FAX:[(   )    -],ADDRESS:[3-5 days]]

## 2019-03-21 NOTE — DISCHARGE NOTE PROVIDER - NSDCHHNEEDSERVICE_GEN_ALL_CORE
Other, specify.../Medication teaching and assessment/Rehabilitation services/Wound care and assessment/Observation and assessment/Teaching and training

## 2019-03-21 NOTE — DISCHARGE NOTE PROVIDER - NSDCFUADDAPPT_GEN_ALL_CORE_FT
You could also followup at [facility] for INR check: Address is: [phone] [place] [address].  Phone number is [phone].

## 2019-03-21 NOTE — PROGRESS NOTE ADULT - ASSESSMENT
Pulmonary HTN, elevated right sided pressures   as per pulm no evidence of PNA  echo reviewed  Low LV volume, LVH pos septal bounce , small effusion,   diastolic dysfunction   agree with lasix , would change to PO as NA is going   BP control   add coreg    HAVEN  improving   follow up with renal     DM  Monitor finger stick. Insulin coverage. Diabetic education and Diabetic diet. Consider nutrition consultation.    elevated trop  stress induced in setting of acute CHF

## 2019-03-21 NOTE — PROGRESS NOTE ADULT - SUBJECTIVE AND OBJECTIVE BOX
[facility] Division of Kidney Diseases & Hypertension  FOLLOW UP NOTE  [phone]--------------------------------------------------------------------------------  Chief Complaint:Respiratory failure    88 year old male with HLD, DVT s/p AC, dementia and hearing loss who presents to the hospital after a fall and hip fracture. Patient's son at bedside giving collateral information. Son states that patient is in a lot of pain from the hip fracture and he is supposed to go for repair per surgery. Patient noted to have PNA on antibiotics and also hypercarbic respiratory failure requiring BiPAP at night. Patient's creatinine noted to be 2.44 and uptrending and was normal on admission. On admission patient received contrast load as well and creatinine started to climb after that. Patient's son states he had not been eating and drinking as well at home either. Nephrology consulted for HAVEN.    Patient seen and examined at bedside. Continues to be somnolent and in pain in the hip. Otherwise unable to obtain ROS from patient.          PAST HISTORY  --------------------------------------------------------------------------------  No significant changes to PMH, PSH, FHx, SHx, unless otherwise noted    ALLERGIES & MEDICATIONS  --------------------------------------------------------------------------------  Allergies    No Known Allergies    Intolerances      Standing Inpatient Medications  ALBUTerol/ipratropium for Nebulization 3 milliLiter(s) Nebulizer every 6 hours  carvedilol 3.125 milliGRAM(s) Oral every 12 hours  heparin  Injectable 5000 Unit(s) SubCutaneous every 8 hours  insulin glargine Injectable (LANTUS) 15 Unit(s) SubCutaneous at bedtime  insulin lispro (HumaLOG) corrective regimen sliding scale   SubCutaneous three times a day before meals  insulin lispro (HumaLOG) corrective regimen sliding scale   SubCutaneous at bedtime  insulin lispro Injectable (HumaLOG) 4 Unit(s) SubCutaneous three times a day before meals  sodium chloride 3%  Inhalation 4 milliLiter(s) Inhalation every 6 hours    PRN Inpatient Medications  acetaminophen   Tablet .. 650 milliGRAM(s) Oral every 6 hours PRN  acetaminophen   Tablet .. 650 milliGRAM(s) Oral every 6 hours PRN  acetaminophen  Suppository .. 650 milliGRAM(s) Rectal every 6 hours PRN  dextrose 40% Gel 15 Gram(s) Oral once PRN  glucagon  Injectable 1 milliGRAM(s) IntraMuscular once PRN  QUEtiapine 25 milliGRAM(s) Oral every 6 hours PRN      REVIEW OF SYSTEMS: Unable to obtain from patient.      All other systems were reviewed and are negative, except as noted.    VITALS/PHYSICAL EXAM  --------------------------------------------------------------------------------  T(C): 37.5 (03-21-19 @ 13:55), Max: 37.5 (03-21-19 @ 13:55)  HR: 98 (03-21-19 @ 17:39) (85 - 102)  BP: 144/84 (03-21-19 @ 17:39) (101/69 - 151/91)  RR: 18 (03-21-19 @ 17:39) (18 - 18)  SpO2: 100% (03-21-19 @ 17:39) (96% - 100%)  Wt(kg): --        03-20-19 @ 07:01  -  03-21-19 @ 07:00  --------------------------------------------------------  IN: 0 mL / OUT: 2500 mL / NET: -2500 mL    03-21-19 @ 07:01  -  03-21-19 @ 18:21  --------------------------------------------------------  IN: 0 mL / OUT: 480 mL / NET: -480 mL      Physical Exam:  	 Gen: NAD,   	HEENT: supple neck, NO JVD   	Pulm: CTA B/L              CV: RRR, S1S2; no rub  	Abd: +BS, soft, nontender/nondistended  	LE:  no edema  	Neuro: No focal deficits,       LABS/STUDIES  --------------------------------------------------------------------------------              12.8   5.02  >-----------<  226      [03-21-19 @ 07:50]              40.4     149  |  111  |  48  ----------------------------<  94      [03-21-19 @ 07:50]  4.4   |  23  |  2.06        Ca     9.2     [03-21-19 @ 07:50]      Mg     2.6     [03-21-19 @ 07:50]      Phos  4.1     [03-21-19 @ 07:50]            Creatinine Trend:  SCr 2.06 [03-21 @ 07:50]  SCr 2.20 [03-20 @ 06:51]  SCr 2.29 [03-19 @ 06:30]  SCr 2.44 [03-18 @ 09:00]  SCr 2.38 [03-17 @ 05:30]    Urinalysis - [03-19-19 @ 12:00]      Color LIGHT YELLOW / Appearance Lt TURBID / SG 1.013 / pH 6.0      Gluc 150 / Ketone NEGATIVE  / Bili NEGATIVE / Urobili NORMAL       Blood MODERATE / Protein 30 / Leuk Est NEGATIVE / Nitrite NEGATIVE      RBC 26-50 / WBC 3-5 / Hyaline  / Gran  / Sq Epi OCC / Non Sq Epi  / Bacteria NEGATIVE    Urine Creatinine 18.20      [03-18-19 @ 13:05]  Urine Sodium 107      [03-18-19 @ 13:05]  Urine Urea Nitrogen 194.8      [03-18-19 @ 13:05]  Urine Potassium 13.1      [03-18-19 @ 13:05]  Urine Chloride 100      [03-18-19 @ 13:05]

## 2019-03-21 NOTE — PROGRESS NOTE ADULT - ASSESSMENT
Assessment and Plan:   Pt is a 87yo Bosnian-speaking M with PMHx of DM, hard of hearing presenting for presented with AMS and respiratory distress following an unwitnessed fall, found to have L femoral neck fracture, sepsis likely 2/2 multifocal PNA, and acute Hypoxic and hypercarbic respiratory failure; improved on BIPAP and abx    #Acute Hypoxic Respiratory Failure  - respiratory distress, coarse rhonchi, CT showing ? multifocal PNA or pulm edema  - completed  vanc by level / zosyn X 7 days  - Chest PT as pt can tolerate  - congestion on CXR, s/p lasix 40 mg IV X 5, Now euvolemic. will change Lasix to 40mg daily  -Echo showed moderate diastolic dysfunction and severe pulm HTN   -Coreg as per Cardiology rec.  -Pulmonary consult appreciated  -Cardiology consult appreciated.    # R/O Sepsis  r/o  PNA, doubt about PNA given resolution of opacities on CXR in 1 week.   blood cultures negative  d/c all abx    #Hyperglycemia  c/w lantus, ISS     #HAVEN  - initial Cr .86,   - likely prerenal, possible contrast induced nephropathy from CT, Renal function improving  - making urine, strict I&Os  - urine lytes   - renally dose meds   -Renal input appreciated.    # Confusion  - improving with BIPAP, 2/2 from hypercarbia  - CTH negative for acute pathology     # hypernatremia:  -Encourage free water intake, tapering down lasix  -Monitor BMP daily    #L femoral neck fracture:  - ortho appreciate recs: Spoke to ortho consult, Ortho is leaning toward elective hemiarthroplasty when pt is medically stable. However, family requesting surgery during this admission due to insurance issue preventing pt from getting elective procedure. Family meeting scheduled for today to finalize the plan.   - tylenol for pain control     #prophylactic measure  - DVT - HSQ (consider lovenox given fracture, however HAVEN)  - Advanced Directive: Pt is DNR/DNI, MOLST in chart

## 2019-03-21 NOTE — CHART NOTE - NSCHARTNOTEFT_GEN_A_CORE
Medical Attending ACP Meeting note:  Met with pt's eldest son/decision maker Shaylee Srdanovic and Grand son Dewayne Srdanovic together with Dr Larkin Orthopedic Consult,  Glenroy and  Faith for 30min for advanced care planning. Pt's medical condition updated to family. Pt has been adequately treated for diastolic heart failure, HAVEN and possible PNA after admission. Pt's condition much improved at this time. However, pt still carries a high risk for perioperative complications, including but not limited to, heart failure, respiratory failure, infection, worsening dementia even death, due to his advanced age, comorbidities (such as Diastolic heart failure, severe pulmonary HTN, renal insufficiency, dementia etc) and poor functional status. Dr Larkin explained in detail to family the 3 options of management for pt's hip fracture, no surgery with pain control, Girdlestone procedure for pain control and Hemiarthroplasty. Risks and benefits of each options explained to family. Faith and Glenroy explained to family about insurance coverage. Pt has a visitor visa, currently only has emergency medicaid, which does not cover for rehab and home care services.  Both Shaylee and Dewayne voiced full understanding of the above discussion and will discuss with pt's other 2 sons and make a decision by tomorrow.

## 2019-03-21 NOTE — PROGRESS NOTE ADULT - SUBJECTIVE AND OBJECTIVE BOX
Subjective: Patient seen and examined. No new events except as noted.     SUBJECTIVE/ROS:  NAD      MEDICATIONS:  MEDICATIONS  (STANDING):  ALBUTerol/ipratropium for Nebulization 3 milliLiter(s) Nebulizer every 6 hours  carvedilol 3.125 milliGRAM(s) Oral every 12 hours  dextrose 5%. 1000 milliLiter(s) (50 mL/Hr) IV Continuous <Continuous>  dextrose 50% Injectable 12.5 Gram(s) IV Push once  dextrose 50% Injectable 25 Gram(s) IV Push once  dextrose 50% Injectable 25 Gram(s) IV Push once  furosemide   Injectable 40 milliGRAM(s) IV Push daily  heparin  Injectable 5000 Unit(s) SubCutaneous every 8 hours  insulin glargine Injectable (LANTUS) 15 Unit(s) SubCutaneous at bedtime  insulin lispro (HumaLOG) corrective regimen sliding scale   SubCutaneous three times a day before meals  insulin lispro (HumaLOG) corrective regimen sliding scale   SubCutaneous at bedtime  insulin lispro Injectable (HumaLOG) 4 Unit(s) SubCutaneous three times a day before meals  piperacillin/tazobactam IVPB. 3.375 Gram(s) IV Intermittent every 12 hours  sodium chloride 3%  Inhalation 4 milliLiter(s) Inhalation every 6 hours      PHYSICAL EXAM:  T(C): 36.6 (03-21-19 @ 08:20), Max: 36.7 (03-20-19 @ 16:20)  HR: 102 (03-21-19 @ 08:20) (84 - 102)  BP: 151/91 (03-21-19 @ 08:20) (130/86 - 151/91)  RR: 18 (03-21-19 @ 08:20) (18 - 19)  SpO2: 98% (03-21-19 @ 08:20) (96% - 100%)  Wt(kg): --  I&O's Summary    20 Mar 2019 07:01  -  21 Mar 2019 07:00  --------------------------------------------------------  IN: 0 mL / OUT: 2500 mL / NET: -2500 mL            JVP: Normal  Neck: supple  Lung: clear   CV: S1 S2 , Murmur:  Abd: soft  Ext: No edema  neuro: Awake / alert  Psych: flat affect  Skin: normal``    LABS/DATA:    CARDIAC MARKERS:                                12.8   5.02  )-----------( 226      ( 21 Mar 2019 07:50 )             40.4     03-21    149<H>  |  111<H>  |  48<H>  ----------------------------<  94  4.4   |  23  |  2.06<H>    Ca    9.2      21 Mar 2019 07:50  Phos  4.1     03-21  Mg     2.6     03-21      proBNP:   Lipid Profile:   HgA1c:   TSH:     TELE:  EKG:

## 2019-03-21 NOTE — DISCHARGE NOTE PROVIDER - HOSPITAL COURSE
88M PMH DM2, Dementia (aaox3 ) presents w/ hypoxic respiratory failure, HAVEN, and severe sepsis 2/2 multifocal PNA s/p fall with left acetabular Fx        Acute Hypoxic and Hypercarbic Respiratory Failure: Respiratory distress, coarse rhonchi, CT showing multifocal PNA started on Vanc by level and Zosyn (Discontinued 3/21); Continued with NC w/ BiPAP at night; Standing duonebs and chest PT as pt can tolerate; Congestion on CXR, s/p lasix 40 mg IV         Multifocal pneumonia: Completed course of Vanco/Zosyn        Hyperglycemia: Continued with Lantus and insulin sliding scale          Acute kidney injury: Initial Cr .86; Stable after IV diuresis; Likely prerenal, possible contrast induced nephropathy from CT; Making urine, strict I&Os; Renally dose medications and avoid nephrotoxins          Confusion: Improving with BIPAP, 2/2 from hypercarbia; CTH negative for acute pathology         Left femoral neck fracture: Tylenol for pain control; FAMILY MEETING REGARDING PLAN _____ ****        Dispo _______ 88M PMH DM2, Dementia (aaox3 ) presents w/ hypoxic respiratory failure, HAVEN, and severe sepsis 2/2 multifocal PNA s/p fall with left acetabular Fx        Acute Hypoxic and Hypercarbic Respiratory Failure: Respiratory distress, coarse rhonchi, CT showing multifocal PNA started on Vanc by level and Zosyn (Discontinued 3/21); Continued with NC w/ BiPAP at night; Standing duonebs and chest PT as pt can tolerate; Congestion on CXR, s/p lasix 40 mg IV         Multifocal pneumonia: Completed course of Vanco/Zosyn        Hyperglycemia: Continued with Lantus and insulin sliding scale          Acute kidney injury: Initial Cr .86; Stable after IV diuresis; Likely prerenal, possible contrast induced nephropathy from CT; Making urine, strict I&Os; Renally dose medications and avoid nephrotoxins          Confusion: Improving with BIPAP, 2/2 from hypercarbia; CTH negative for acute pathology         Left femoral neck fracture: Tylenol for pain control; FAMILY MEETING REGARDING PLAN _____ ****        DISPO: Home with home care ([facility]) 88M PMH DM2, Dementia (aaox3 ) presents w/ hypoxic respiratory failure, HAVEN, and severe sepsis 2/2 multifocal PNA s/p fall with left acetabular Fx. S/P Left hip hemiarthoplasty on 3/25/19, stable  for discharge. Cleared by nehpology, endocrinologist. D/w plan with attending, meds/supplies sent to [facility] (no insurance). Pt discharged to home with home care services and equipment (walker, wheelchair and electric bed - to be paid by family).         Acute Hypoxic and Hypercarbic Respiratory Failure/PNA 2/2 pulm edema, CHF    - continue standing duonebs, NC    - Chest PT as pt can tolerate, incentive spirometer    - congestion on CXR, continue with Lasix POdaily         Acute Diastolic CHF/ Pulm Edema    - ECHO w/ moderate diastolic dysfrunction and severe pulm HTN    - Continue with lasix daily and Coreg        Hyperglycemia, type 2 DM    - monitor FS every morning    - 3/27 HgbA1C 9.7%, ENDO consulted-    - Endo consulted - Take NPH 8 units before breakfast daily on D/C    - Outpatient follow up [facility] endocrine clinic [phone].    - 3/27 Nutrition consulted, f/u prealbumin 7.0    -- wound care consulted         HAVEN likely prerenal, possible contrast induced nephropathy from CT     - initial Cr .86, trending up, stable today after IV diuresis, Trend down    - Garcia (replaced 3/25), maintain strict I&Os; Failed TOV 3/27- follow straight cath protocol    - Nephrology consulted - maintain adequate perfusion pressures, renally dose meds, avoid nephrotioxic meds     - To go home with Garcia d/t retention and failed TOV    - Take Flomax 0.4mg at home          Confusion, Dementia     - CTH negative for acute pathology         L femoral neck fracture     - Ortho recs: S/P Left hip hemiarthoplasty 3/25, posterior hip precautions, no narcotics, LLE WBAT    - PRN tylenol q6h for pain control    - Pyridium given 3/26; lidocaine for penile discomfort    - PT/OT recs; LLE WBAT, OOB with RW and assistance; home with services         Prophylactic measure    - DVT - HSQ in hospital (consider lovenox given fracture, however HAVEN)    - Pt on Coumadin 1mg PO every evening         -Advanced Directive: son at bedside, extensive conversation regarding current critical medical illness, son understood, DNR/DNI, MOLST in chart        DISPO: Home with Home Care services, Family to pay for wheelchair, bed and supplies/meds

## 2019-03-21 NOTE — DISCHARGE NOTE PROVIDER - CARE PROVIDER_API CALL
Abdiel Shabazz  Orthopedics  [address], [address]  [place] [address]  Phone: [phone]  Fax: [phone]  Follow Up Time: 2 weeks from surgery - Please make appt for 4/8/19 for staple removal  Phone: (   )    -  Fax: (   )    -  Follow Up Time:     Outpatient follow up [facility] endocrine clinic,   [phone]  Phone: (   )    -  Fax: (   )    -  Follow Up Time: 2 weeks    Follow up at [facility] for Coumadin/INR lab testing,   3-5 days  Phone: (   )    -  Fax: (   )    -  Follow Up Time:

## 2019-03-21 NOTE — PROGRESS NOTE ADULT - PROBLEM SELECTOR PLAN 2
Patient with elevated sodium to 149 today. In this setting and continued elevation in creatinine, can consider giving small volume of fluids back to help hydrate patient.

## 2019-03-22 PROBLEM — F03.90 UNSPECIFIED DEMENTIA WITHOUT BEHAVIORAL DISTURBANCE: Chronic | Status: ACTIVE | Noted: 2019-03-14

## 2019-03-22 PROBLEM — E78.5 HYPERLIPIDEMIA, UNSPECIFIED: Chronic | Status: ACTIVE | Noted: 2019-03-14

## 2019-03-22 PROBLEM — H91.90 UNSPECIFIED HEARING LOSS, UNSPECIFIED EAR: Chronic | Status: ACTIVE | Noted: 2019-03-14

## 2019-03-22 LAB
ANION GAP SERPL CALC-SCNC: 12 MMO/L — SIGNIFICANT CHANGE UP (ref 7–14)
BUN SERPL-MCNC: 42 MG/DL — HIGH (ref 7–23)
CALCIUM SERPL-MCNC: 8.2 MG/DL — LOW (ref 8.4–10.5)
CHLORIDE SERPL-SCNC: 109 MMOL/L — HIGH (ref 98–107)
CO2 SERPL-SCNC: 22 MMOL/L — SIGNIFICANT CHANGE UP (ref 22–31)
CREAT SERPL-MCNC: 1.82 MG/DL — HIGH (ref 0.5–1.3)
GLUCOSE BLDC GLUCOMTR-MCNC: 103 MG/DL — HIGH (ref 70–99)
GLUCOSE BLDC GLUCOMTR-MCNC: 109 MG/DL — HIGH (ref 70–99)
GLUCOSE BLDC GLUCOMTR-MCNC: 172 MG/DL — HIGH (ref 70–99)
GLUCOSE BLDC GLUCOMTR-MCNC: 175 MG/DL — HIGH (ref 70–99)
GLUCOSE BLDC GLUCOMTR-MCNC: 67 MG/DL — LOW (ref 70–99)
GLUCOSE BLDC GLUCOMTR-MCNC: 84 MG/DL — SIGNIFICANT CHANGE UP (ref 70–99)
GLUCOSE SERPL-MCNC: 62 MG/DL — LOW (ref 70–99)
MAGNESIUM SERPL-MCNC: 2.5 MG/DL — SIGNIFICANT CHANGE UP (ref 1.6–2.6)
PHOSPHATE SERPL-MCNC: 3.5 MG/DL — SIGNIFICANT CHANGE UP (ref 2.5–4.5)
POTASSIUM SERPL-MCNC: 3.5 MMOL/L — SIGNIFICANT CHANGE UP (ref 3.5–5.3)
POTASSIUM SERPL-SCNC: 3.5 MMOL/L — SIGNIFICANT CHANGE UP (ref 3.5–5.3)
SODIUM SERPL-SCNC: 143 MMOL/L — SIGNIFICANT CHANGE UP (ref 135–145)

## 2019-03-22 PROCEDURE — 99233 SBSQ HOSP IP/OBS HIGH 50: CPT

## 2019-03-22 PROCEDURE — 99233 SBSQ HOSP IP/OBS HIGH 50: CPT | Mod: GC

## 2019-03-22 RX ADMIN — HEPARIN SODIUM 5000 UNIT(S): 5000 INJECTION INTRAVENOUS; SUBCUTANEOUS at 21:27

## 2019-03-22 RX ADMIN — Medication 3 MILLILITER(S): at 04:53

## 2019-03-22 RX ADMIN — HEPARIN SODIUM 5000 UNIT(S): 5000 INJECTION INTRAVENOUS; SUBCUTANEOUS at 13:27

## 2019-03-22 RX ADMIN — Medication 3 MILLILITER(S): at 11:01

## 2019-03-22 RX ADMIN — SODIUM CHLORIDE 4 MILLILITER(S): 9 INJECTION INTRAMUSCULAR; INTRAVENOUS; SUBCUTANEOUS at 21:14

## 2019-03-22 RX ADMIN — CARVEDILOL PHOSPHATE 3.12 MILLIGRAM(S): 80 CAPSULE, EXTENDED RELEASE ORAL at 06:11

## 2019-03-22 RX ADMIN — HEPARIN SODIUM 5000 UNIT(S): 5000 INJECTION INTRAVENOUS; SUBCUTANEOUS at 06:11

## 2019-03-22 RX ADMIN — CARVEDILOL PHOSPHATE 3.12 MILLIGRAM(S): 80 CAPSULE, EXTENDED RELEASE ORAL at 17:07

## 2019-03-22 RX ADMIN — Medication 3 MILLILITER(S): at 17:26

## 2019-03-22 RX ADMIN — Medication 3 MILLILITER(S): at 21:09

## 2019-03-22 RX ADMIN — Medication 2: at 12:12

## 2019-03-22 RX ADMIN — SODIUM CHLORIDE 4 MILLILITER(S): 9 INJECTION INTRAMUSCULAR; INTRAVENOUS; SUBCUTANEOUS at 11:12

## 2019-03-22 RX ADMIN — SODIUM CHLORIDE 4 MILLILITER(S): 9 INJECTION INTRAMUSCULAR; INTRAVENOUS; SUBCUTANEOUS at 17:38

## 2019-03-22 RX ADMIN — Medication 2: at 17:06

## 2019-03-22 RX ADMIN — Medication 40 MILLIGRAM(S): at 06:11

## 2019-03-22 RX ADMIN — SODIUM CHLORIDE 4 MILLILITER(S): 9 INJECTION INTRAMUSCULAR; INTRAVENOUS; SUBCUTANEOUS at 04:50

## 2019-03-22 NOTE — PROGRESS NOTE ADULT - SUBJECTIVE AND OBJECTIVE BOX
[facility] DIVISION OF KIDNEY DISEASES AND HYPERTENSION -- FOLLOW UP NOTE  --------------------------------------------------------------------------------    Chief Complaint: none . has concepcion catheter in     24 hour events/subjective:        PAST HISTORY  --------------------------------------------------------------------------------  No significant changes to PMH, PSH, FHx, SHx, unless otherwise noted    ALLERGIES & MEDICATIONS  --------------------------------------------------------------------------------  Allergies    No Known Allergies    Intolerances      Standing Inpatient Medications  ALBUTerol/ipratropium for Nebulization 3 milliLiter(s) Nebulizer every 6 hours  carvedilol 3.125 milliGRAM(s) Oral every 12 hours  dextrose 5%. 1000 milliLiter(s) IV Continuous <Continuous>  dextrose 50% Injectable 12.5 Gram(s) IV Push once  dextrose 50% Injectable 25 Gram(s) IV Push once  dextrose 50% Injectable 25 Gram(s) IV Push once  furosemide    Tablet 40 milliGRAM(s) Oral daily  heparin  Injectable 5000 Unit(s) SubCutaneous every 8 hours  insulin lispro (HumaLOG) corrective regimen sliding scale   SubCutaneous three times a day before meals  insulin lispro (HumaLOG) corrective regimen sliding scale   SubCutaneous at bedtime  sodium chloride 3%  Inhalation 4 milliLiter(s) Inhalation every 6 hours    PRN Inpatient Medications  acetaminophen   Tablet .. 650 milliGRAM(s) Oral every 6 hours PRN  acetaminophen   Tablet .. 650 milliGRAM(s) Oral every 6 hours PRN  acetaminophen  Suppository .. 650 milliGRAM(s) Rectal every 6 hours PRN  dextrose 40% Gel 15 Gram(s) Oral once PRN  glucagon  Injectable 1 milliGRAM(s) IntraMuscular once PRN  QUEtiapine 25 milliGRAM(s) Oral every 6 hours PRN        VITALS/PHYSICAL EXAM  --------------------------------------------------------------------------------  T(C): 36.7 (03-22-19 @ 11:51), Max: 36.7 (03-22-19 @ 11:51)  HR: 81 (03-22-19 @ 11:51) (81 - 98)  BP: 139/80 (03-22-19 @ 11:51) (138/85 - 148/80)  RR: 17 (03-22-19 @ 11:51) (16 - 18)  SpO2: 98% (03-22-19 @ 11:51) (97% - 100%)  Wt(kg): --        03-21-19 @ 07:01  -  03-22-19 @ 07:00  --------------------------------------------------------  IN: 0 mL / OUT: 1380 mL / NET: -1380 mL      Physical Exam:            Gen: NAD,   	HEENT: supple neck, NO JVD   	Pulm: CTA B/L             CV: RRR, S1S2; no rub  	Abd: +BS, soft, nontender/nondistended  	UE:  no edema;   	LE:   1 + edema  	Neuro: No focal deficits,   	Psych: Normal affect and mood          	>>> <<<    LABS/STUDIES  --------------------------------------------------------------------------------              12.8   5.02  >-----------<  226      [03-21-19 @ 07:50]              40.4     143  |  109  |  42  ----------------------------<  62      [03-22-19 @ 06:15]  3.5   |  22  |  1.82        Ca     8.2     [03-22-19 @ 06:15]      Mg     2.5     [03-22-19 @ 06:15]      Phos  3.5     [03-22-19 @ 06:15]            Creatinine Trend:  SCr 1.82 [03-22 @ 06:15]  SCr 2.06 [03-21 @ 07:50]  SCr 2.20 [03-20 @ 06:51]  SCr 2.29 [03-19 @ 06:30]  SCr 2.44 [03-18 @ 09:00]    Sodium trend:    Urinalysis - [03-19-19 @ 12:00]      Color LIGHT YELLOW / Appearance Lt TURBID / SG 1.013 / pH 6.0      Gluc 150 / Ketone NEGATIVE  / Bili NEGATIVE / Urobili NORMAL       Blood MODERATE / Protein 30 / Leuk Est NEGATIVE / Nitrite NEGATIVE      RBC 26-50 / WBC 3-5 / Hyaline  / Gran  / Sq Epi OCC / Non Sq Epi  / Bacteria NEGATIVE    Urine Creatinine 18.20      [03-18-19 @ 13:05]  Urine Sodium 107      [03-18-19 @ 13:05]  Urine Urea Nitrogen 194.8      [03-18-19 @ 13:05]  Urine Potassium 13.1      [03-18-19 @ 13:05]  Urine Chloride 100      [03-18-19 @ 13:05]    TSH 2.09      [03-14-19 @ 01:45]

## 2019-03-22 NOTE — PROVIDER CONTACT NOTE (CRITICAL VALUE NOTIFICATION) - SITUATION
Pt admitted for respiratory failure
Pt calcium 5.9 and phosphorous 0.8
Stat labs ordered, Blood gas lactate 4.7

## 2019-03-22 NOTE — PROGRESS NOTE ADULT - SUBJECTIVE AND OBJECTIVE BOX
Patient is a 88y old  Male who presents with a chief complaint of fall (22 Mar 2019 08:52)      SUBJECTIVE / OVERNIGHT EVENTS: No complaints.    MEDICATIONS  (STANDING):  ALBUTerol/ipratropium for Nebulization 3 milliLiter(s) Nebulizer every 6 hours  carvedilol 3.125 milliGRAM(s) Oral every 12 hours  dextrose 5%. 1000 milliLiter(s) (50 mL/Hr) IV Continuous <Continuous>  dextrose 50% Injectable 12.5 Gram(s) IV Push once  dextrose 50% Injectable 25 Gram(s) IV Push once  dextrose 50% Injectable 25 Gram(s) IV Push once  furosemide    Tablet 40 milliGRAM(s) Oral daily  heparin  Injectable 5000 Unit(s) SubCutaneous every 8 hours  insulin glargine Injectable (LANTUS) 15 Unit(s) SubCutaneous at bedtime  insulin lispro (HumaLOG) corrective regimen sliding scale   SubCutaneous three times a day before meals  insulin lispro (HumaLOG) corrective regimen sliding scale   SubCutaneous at bedtime  insulin lispro Injectable (HumaLOG) 4 Unit(s) SubCutaneous three times a day before meals  sodium chloride 3%  Inhalation 4 milliLiter(s) Inhalation every 6 hours    MEDICATIONS  (PRN):  acetaminophen   Tablet .. 650 milliGRAM(s) Oral every 6 hours PRN Mild Pain (1 - 3), Moderate Pain (4 - 6), Severe Pain (7 - 10)  acetaminophen   Tablet .. 650 milliGRAM(s) Oral every 6 hours PRN Temp greater or equal to 38C (100.4F)  acetaminophen  Suppository .. 650 milliGRAM(s) Rectal every 6 hours PRN Temp greater or equal to 38C (100.4F)  dextrose 40% Gel 15 Gram(s) Oral once PRN Blood Glucose LESS THAN 70 milliGRAM(s)/deciliter  glucagon  Injectable 1 milliGRAM(s) IntraMuscular once PRN Glucose LESS THAN 70 milligrams/deciliter  QUEtiapine 25 milliGRAM(s) Oral every 6 hours PRN agitation      Vital Signs Last 24 Hrs  T(C): 36.4 (22 Mar 2019 06:09), Max: 37.5 (21 Mar 2019 13:55)  T(F): 97.5 (22 Mar 2019 06:09), Max: 99.5 (21 Mar 2019 13:55)  HR: 89 (22 Mar 2019 06:09) (85 - 101)  BP: 148/80 (22 Mar 2019 06:09) (101/69 - 148/80)  BP(mean): --  RR: 16 (22 Mar 2019 06:09) (16 - 18)  SpO2: 97% (22 Mar 2019 06:09) (97% - 100%)  CAPILLARY BLOOD GLUCOSE      POCT Blood Glucose.: 109 mg/dL (22 Mar 2019 08:41)  POCT Blood Glucose.: 67 mg/dL (22 Mar 2019 08:28)  POCT Blood Glucose.: 84 mg/dL (22 Mar 2019 07:14)  POCT Blood Glucose.: 80 mg/dL (21 Mar 2019 21:39)  POCT Blood Glucose.: 81 mg/dL (21 Mar 2019 16:54)  POCT Blood Glucose.: 83 mg/dL (21 Mar 2019 11:37)    I&O's Summary    21 Mar 2019 07:01  -  22 Mar 2019 07:00  --------------------------------------------------------  IN: 0 mL / OUT: 1380 mL / NET: -1380 mL        PHYSICAL EXAM:  GENERAL: NAD, well-developed  HEAD:  Atraumatic, Normocephalic  EYES: EOMI, PERRLA, conjunctiva and sclera clear  NECK: Supple, No JVD  CHEST/LUNG: Clear to auscultation bilaterally; No wheeze  HEART: Regular rate and rhythm; No murmurs, rubs, or gallops  ABDOMEN: Soft, Nontender, Nondistended; Bowel sounds present  EXTREMITIES:  2+ Peripheral Pulses, No clubbing, cyanosis, or edema  PSYCH: AAOx1  NEUROLOGY: non-focal  SKIN: No rashes or lesions    LABS:                        12.8   5.02  )-----------( 226      ( 21 Mar 2019 07:50 )             40.4     03-22    143  |  109<H>  |  42<H>  ----------------------------<  62<L>  3.5   |  22  |  1.82<H>    Ca    8.2<L>      22 Mar 2019 06:15  Phos  3.5     03-22  Mg     2.5     03-22                RADIOLOGY & ADDITIONAL TESTS:    Imaging Personally Reviewed:    Consultant(s) Notes Reviewed:  Card, Pulm.    Care Discussed with Consultants/Other Providers:

## 2019-03-22 NOTE — PROGRESS NOTE ADULT - ASSESSMENT
Patient with acute kidney injury likely multifactorial in nature.  -contrast causing the creatinine to rise leading  to HAVEN from direct tubular damage ... improving 1.8  - urinary retention failed voiding Garcia back ----  -avoid nephrotoxic medications (dose vancomycin daily with trough levels),   -maintain adequate perfusion pressures, and renally dose medications. Nephrology will continue to follow.	  - can given Lasix ... on 40mg daily

## 2019-03-22 NOTE — PROVIDER CONTACT NOTE (CRITICAL VALUE NOTIFICATION) - ACTION/TREATMENT ORDERED:
Blood glucose level 67.  Cup of apple juice given.  Blood glucose rechecked, result was 109
fluids ordered, will continue to monitor
phosphorous IV and continue to monitor

## 2019-03-22 NOTE — PROGRESS NOTE ADULT - ATTENDING COMMENTS
I agree with the above note on this patient. All pertinent films have been reviewed. Please refer to clinical documentation of the history, physical examinations, data summary, and both assessment and plan as documented above and with which I agree.    OR planned for Monday, 3/25 5pm per family preference  please medically optimize patient    Abdiel Shabazz MD  Attending Orthopedic Surgeon

## 2019-03-22 NOTE — DIETITIAN INITIAL EVALUATION ADULT. - OTHER INFO
Length Of Stay nutrition assessment. 87 y/o M presenting with hypoxic respiratory failure s/p fall with L femoral neck fracture. Plans for surgery. A&Ox1. Per RN, good PO intake- son helps with meals. No GI distress (nausea/vomiting/diarrhea/constipation.) No chewing or swallowing difficulties at this time. Unable to assess weight history.

## 2019-03-22 NOTE — DIETITIAN INITIAL EVALUATION ADULT. - ENERGY NEEDS
Ht: 69 in Wt: (3/14) 160 pounds dosing  BMI: 23.6  IBW: 160 pounds   Skin: 1+ L hip and leg, 2+ scrotum. No pressure injuries noted.

## 2019-03-22 NOTE — DIETITIAN INITIAL EVALUATION ADULT. - DIET TYPE
dysphagia 3, soft, thin liquids/DASH/TLC (sodium and cholesterol restricted diet)/consistent carbohydrate gestational diabetic

## 2019-03-22 NOTE — PROGRESS NOTE ADULT - SUBJECTIVE AND OBJECTIVE BOX
Orthopedic Surgery Progress Note  No acute events overnight.  Pain controlled.      O:  Vital Signs Last 24 Hrs  T(C): 36.4 (22 Mar 2019 06:09), Max: 37.5 (21 Mar 2019 13:55)  T(F): 97.5 (22 Mar 2019 06:09), Max: 99.5 (21 Mar 2019 13:55)  HR: 89 (22 Mar 2019 06:09) (85 - 102)  BP: 148/80 (22 Mar 2019 06:09) (101/69 - 151/91)  RR: 16 (22 Mar 2019 06:09) (16 - 18)  SpO2: 97% (22 Mar 2019 06:09) (97% - 100%)    Gen: NAD  LLE  skin intact  shortened/ER  patient non-compliant with exam  WWP distally    Labs:                        12.8   5.02  )-----------( 226      ( 21 Mar 2019 07:50 )             40.4                         11.7   4.86  )-----------( 176      ( 20 Mar 2019 06:51 )             36.1       03-21    149<H>  |  111<H>  |  48<H>  ----------------------------<  94  4.4   |  23  |  2.06<H>      A/P 88y year old male s/p with L femoral neck fracture and multifocal PNA currently on BiPAP and HAVEN.    Possible girdlestone procedure 3/25 if medically cleared and pending family meeting today  FU medical clearance  Pain Control  DVT PPX per primary  TTWB LLE  Continue abx and supportive care for PNA  DNR/DNI    Dylan Curry MD

## 2019-03-22 NOTE — PROGRESS NOTE ADULT - ASSESSMENT
acute diastolic chf  pulm HTN  stable  cont current meds    HAVEN  improving   follow up with renal     DM  Monitor finger stick. Insulin coverage. Diabetic education and Diabetic diet. Consider nutrition consultation.    elevated trop  stress induced in setting of acute CHF

## 2019-03-22 NOTE — DIETITIAN INITIAL EVALUATION ADULT. - PERTINENT MEDS FT
MEDICATIONS  (STANDING):  ALBUTerol/ipratropium for Nebulization 3 milliLiter(s) Nebulizer every 6 hours  carvedilol 3.125 milliGRAM(s) Oral every 12 hours  dextrose 5%. 1000 milliLiter(s) (50 mL/Hr) IV Continuous <Continuous>  dextrose 50% Injectable 12.5 Gram(s) IV Push once  dextrose 50% Injectable 25 Gram(s) IV Push once  dextrose 50% Injectable 25 Gram(s) IV Push once  furosemide    Tablet 40 milliGRAM(s) Oral daily  heparin  Injectable 5000 Unit(s) SubCutaneous every 8 hours  insulin lispro (HumaLOG) corrective regimen sliding scale   SubCutaneous three times a day before meals  insulin lispro (HumaLOG) corrective regimen sliding scale   SubCutaneous at bedtime  sodium chloride 3%  Inhalation 4 milliLiter(s) Inhalation every 6 hours

## 2019-03-22 NOTE — DIETITIAN INITIAL EVALUATION ADULT. - NS AS NUTRI INTERV MEALS SNACK
Change diet to consistent carbohydrate, soft. Encourage PO intake and honor food preferences as able. Please provide feeding assistance.

## 2019-03-22 NOTE — PROGRESS NOTE ADULT - ATTENDING COMMENTS
Spoke to son Randi on the phone. He informed me that after discussion with pt's 2 other sons and other family members, including a physician relative, family decided to proceed with surgical option knowing the high risk associated with the procedure. He stated that Family is willing to take the risks and accept whatever the outcome from the procedure.     Will medically optimize pt over the weekend for OR early next week.

## 2019-03-22 NOTE — PROGRESS NOTE ADULT - ASSESSMENT
Assessment and Plan:   Pt is a 87yo [place]ian-speaking M with PMHx of DM, hard of hearing presenting for presented with AMS and respiratory distress following an unwitnessed fall, found to have L femoral neck fracture, sepsis likely 2/2 multifocal PNA, and acute Hypoxic and hypercarbic respiratory failure; improved on BIPAP and abx    #Acute Hypoxic Respiratory Failure  - respiratory distress, coarse rhonchi, CT showing ? multifocal PNA or pulm edema  - completed  vanc by level / zosyn X 7 days  - Chest PT as pt can tolerate  - congestion on CXR, s/p lasix 40 mg IV X 5, Now euvolemic. will change Lasix to 40mg daily  -Echo showed moderate diastolic dysfunction and severe pulm HTN   -Coreg as per Cardiology rec.  -Pulmonary consult appreciated  -Cardiology consult appreciated.    # R/O Sepsis  r/o  PNA, doubt about PNA given resolution of opacities on CXR in 1 week.   blood cultures negative  d/c all abx    #Hyperglycemia  c/w lantus, ISS     #HAVEN  - initial Cr .86,   - likely prerenal, possible contrast induced nephropathy from CT, Renal function improving  - making urine, strict I&Os  - urine lytes   - renally dose meds   -Renal input appreciated.    # Confusion  - improving with BIPAP, 2/2 from hypercarbia  - CTH negative for acute pathology     # hypernatremia:  -Encourage free water intake, tapering down lasix  -Monitor BMP daily    #L femoral neck fracture:  - ortho appreciate recs: Spoke to ortho consult, Ortho is leaning toward elective hemiarthroplasty when pt is medically stable. However, family requesting surgery during this admission due to insurance issue preventing pt from getting elective procedure. Family meeting scheduled for today to finalize the plan.   - tylenol for pain control     #prophylactic measure  - DVT - HSQ (consider lovenox given fracture, however HAVEN)  - Advanced Directive: Pt is DNR/DNI, MOLST in chart Assessment and Plan:   Pt is a 89yo [place]nian-speaking M with PMHx of DM, hard of hearing presenting for presented with AMS and respiratory distress following an unwitnessed fall, found to have L femoral neck fracture, sepsis likely 2/2 multifocal PNA, and acute Hypoxic and hypercarbic respiratory failure; improved on BIPAP and abx    #Acute Hypoxic Respiratory Failure, Resolved!  - respiratory distress, coarse rhonchi, CT showing ? multifocal PNA or pulm edema  - completed  vanc by level / zosyn X 7 days  - Chest PT as pt can tolerate  - congestion on CXR, s/p lasix 40 mg IV X 5,   -Now euvolemic. will cont.  Lasix to 40mg daily  -Echo showed moderate diastolic dysfunction and severe pulm HTN   -Coreg as per Cardiology rec.  -Pulmonary consult appreciated  -Cardiology consult appreciated.    # R/O Sepsis  r/o  PNA, doubt about PNA given resolution of opacities on CXR in 1 week.   blood cultures negative  d/c all abx  No signs of infection at this time.     #Hyperglycemia, resolved. Now hypoglycemic  -D/C Lantus  -FSSS with Insulin coverage    #HAVEN  - initial Cr .86,   - likely prerenal, possible contrast induced nephropathy from CT, Renal function improving  - making urine, strict I&Os  - urine lytes   - renally dose meds   -Renal input appreciated.    # Confusion  - resolved  - CTH negative for acute pathology     # hypernatremia:  Resolved.  -Monitor BMP daily    #L femoral neck fracture:  - ortho appreciate recs: Had family meeting yesterday. Family decided to proceed with Surgery.   -F/U with Ortho re: surgery  - tylenol for pain control     #prophylactic measure  - DVT - HSQ (consider lovenox given fracture, however HAVEN)  - Advanced Directive: Pt is DNR/DNI, MOLST in chart

## 2019-03-22 NOTE — PROGRESS NOTE ADULT - SUBJECTIVE AND OBJECTIVE BOX
Subjective: Patient seen and examined. No new events except as noted.     SUBJECTIVE/ROS:  NAD        MEDICATIONS:  MEDICATIONS  (STANDING):  ALBUTerol/ipratropium for Nebulization 3 milliLiter(s) Nebulizer every 6 hours  carvedilol 3.125 milliGRAM(s) Oral every 12 hours  dextrose 5%. 1000 milliLiter(s) (50 mL/Hr) IV Continuous <Continuous>  dextrose 50% Injectable 12.5 Gram(s) IV Push once  dextrose 50% Injectable 25 Gram(s) IV Push once  dextrose 50% Injectable 25 Gram(s) IV Push once  furosemide    Tablet 40 milliGRAM(s) Oral daily  heparin  Injectable 5000 Unit(s) SubCutaneous every 8 hours  insulin glargine Injectable (LANTUS) 15 Unit(s) SubCutaneous at bedtime  insulin lispro (HumaLOG) corrective regimen sliding scale   SubCutaneous three times a day before meals  insulin lispro (HumaLOG) corrective regimen sliding scale   SubCutaneous at bedtime  insulin lispro Injectable (HumaLOG) 4 Unit(s) SubCutaneous three times a day before meals  sodium chloride 3%  Inhalation 4 milliLiter(s) Inhalation every 6 hours      PHYSICAL EXAM:  T(C): 36.4 (03-22-19 @ 06:09), Max: 37.5 (03-21-19 @ 13:55)  HR: 89 (03-22-19 @ 06:09) (85 - 101)  BP: 148/80 (03-22-19 @ 06:09) (101/69 - 148/80)  RR: 16 (03-22-19 @ 06:09) (16 - 18)  SpO2: 97% (03-22-19 @ 06:09) (97% - 100%)  Wt(kg): --  I&O's Summary    21 Mar 2019 07:01  -  22 Mar 2019 07:00  --------------------------------------------------------  IN: 0 mL / OUT: 1380 mL / NET: -1380 mL            JVP: Normal  Neck: supple  Lung: clear   CV: S1 S2 , Murmur:  Abd: soft  Ext: No edema  neuro: Awake / alert  Psych: flat affect  Skin: normal``    LABS/DATA:    CARDIAC MARKERS:                                12.8   5.02  )-----------( 226      ( 21 Mar 2019 07:50 )             40.4     03-22    143  |  109<H>  |  42<H>  ----------------------------<  62<L>  3.5   |  22  |  1.82<H>    Ca    8.2<L>      22 Mar 2019 06:15  Phos  3.5     03-22  Mg     2.5     03-22      proBNP:   Lipid Profile:   HgA1c:   TSH:     TELE:  EKG:

## 2019-03-23 DIAGNOSIS — I50.33 ACUTE ON CHRONIC DIASTOLIC (CONGESTIVE) HEART FAILURE: ICD-10-CM

## 2019-03-23 DIAGNOSIS — E78.5 HYPERLIPIDEMIA, UNSPECIFIED: ICD-10-CM

## 2019-03-23 DIAGNOSIS — J96.01 ACUTE RESPIRATORY FAILURE WITH HYPOXIA: ICD-10-CM

## 2019-03-23 DIAGNOSIS — R73.9 HYPERGLYCEMIA, UNSPECIFIED: ICD-10-CM

## 2019-03-23 DIAGNOSIS — F03.90 UNSPECIFIED DEMENTIA WITHOUT BEHAVIORAL DISTURBANCE: ICD-10-CM

## 2019-03-23 DIAGNOSIS — Z29.9 ENCOUNTER FOR PROPHYLACTIC MEASURES, UNSPECIFIED: ICD-10-CM

## 2019-03-23 LAB
ANION GAP SERPL CALC-SCNC: 11 MMO/L — SIGNIFICANT CHANGE UP (ref 7–14)
BASOPHILS # BLD AUTO: 0.02 K/UL — SIGNIFICANT CHANGE UP (ref 0–0.2)
BASOPHILS NFR BLD AUTO: 0.3 % — SIGNIFICANT CHANGE UP (ref 0–2)
BUN SERPL-MCNC: 37 MG/DL — HIGH (ref 7–23)
CALCIUM SERPL-MCNC: 8.5 MG/DL — SIGNIFICANT CHANGE UP (ref 8.4–10.5)
CHLORIDE SERPL-SCNC: 107 MMOL/L — SIGNIFICANT CHANGE UP (ref 98–107)
CO2 SERPL-SCNC: 23 MMOL/L — SIGNIFICANT CHANGE UP (ref 22–31)
CREAT SERPL-MCNC: 1.76 MG/DL — HIGH (ref 0.5–1.3)
EOSINOPHIL # BLD AUTO: 0.17 K/UL — SIGNIFICANT CHANGE UP (ref 0–0.5)
EOSINOPHIL NFR BLD AUTO: 2.9 % — SIGNIFICANT CHANGE UP (ref 0–6)
GLUCOSE BLDC GLUCOMTR-MCNC: 106 MG/DL — HIGH (ref 70–99)
GLUCOSE BLDC GLUCOMTR-MCNC: 119 MG/DL — HIGH (ref 70–99)
GLUCOSE BLDC GLUCOMTR-MCNC: 194 MG/DL — HIGH (ref 70–99)
GLUCOSE BLDC GLUCOMTR-MCNC: 199 MG/DL — HIGH (ref 70–99)
GLUCOSE SERPL-MCNC: 130 MG/DL — HIGH (ref 70–99)
HCT VFR BLD CALC: 36.4 % — LOW (ref 39–50)
HGB BLD-MCNC: 11.7 G/DL — LOW (ref 13–17)
IMM GRANULOCYTES NFR BLD AUTO: 0.5 % — SIGNIFICANT CHANGE UP (ref 0–1.5)
LYMPHOCYTES # BLD AUTO: 1.09 K/UL — SIGNIFICANT CHANGE UP (ref 1–3.3)
LYMPHOCYTES # BLD AUTO: 18.6 % — SIGNIFICANT CHANGE UP (ref 13–44)
MAGNESIUM SERPL-MCNC: 2.4 MG/DL — SIGNIFICANT CHANGE UP (ref 1.6–2.6)
MCHC RBC-ENTMCNC: 30.2 PG — SIGNIFICANT CHANGE UP (ref 27–34)
MCHC RBC-ENTMCNC: 32.1 % — SIGNIFICANT CHANGE UP (ref 32–36)
MCV RBC AUTO: 93.8 FL — SIGNIFICANT CHANGE UP (ref 80–100)
MONOCYTES # BLD AUTO: 0.37 K/UL — SIGNIFICANT CHANGE UP (ref 0–0.9)
MONOCYTES NFR BLD AUTO: 6.3 % — SIGNIFICANT CHANGE UP (ref 2–14)
NEUTROPHILS # BLD AUTO: 4.18 K/UL — SIGNIFICANT CHANGE UP (ref 1.8–7.4)
NEUTROPHILS NFR BLD AUTO: 71.4 % — SIGNIFICANT CHANGE UP (ref 43–77)
NRBC # FLD: 0 K/UL — LOW (ref 25–125)
PHOSPHATE SERPL-MCNC: 3.7 MG/DL — SIGNIFICANT CHANGE UP (ref 2.5–4.5)
PLATELET # BLD AUTO: 273 K/UL — SIGNIFICANT CHANGE UP (ref 150–400)
PMV BLD: 9.3 FL — SIGNIFICANT CHANGE UP (ref 7–13)
POTASSIUM SERPL-MCNC: 3.8 MMOL/L — SIGNIFICANT CHANGE UP (ref 3.5–5.3)
POTASSIUM SERPL-SCNC: 3.8 MMOL/L — SIGNIFICANT CHANGE UP (ref 3.5–5.3)
RBC # BLD: 3.88 M/UL — LOW (ref 4.2–5.8)
RBC # FLD: 12.9 % — SIGNIFICANT CHANGE UP (ref 10.3–14.5)
SODIUM SERPL-SCNC: 141 MMOL/L — SIGNIFICANT CHANGE UP (ref 135–145)
WBC # BLD: 5.86 K/UL — SIGNIFICANT CHANGE UP (ref 3.8–10.5)
WBC # FLD AUTO: 5.86 K/UL — SIGNIFICANT CHANGE UP (ref 3.8–10.5)

## 2019-03-23 PROCEDURE — 99233 SBSQ HOSP IP/OBS HIGH 50: CPT

## 2019-03-23 RX ORDER — IPRATROPIUM/ALBUTEROL SULFATE 18-103MCG
3 AEROSOL WITH ADAPTER (GRAM) INHALATION EVERY 6 HOURS
Qty: 0 | Refills: 0 | Status: DISCONTINUED | OUTPATIENT
Start: 2019-03-23 | End: 2019-03-29

## 2019-03-23 RX ADMIN — HEPARIN SODIUM 5000 UNIT(S): 5000 INJECTION INTRAVENOUS; SUBCUTANEOUS at 06:08

## 2019-03-23 RX ADMIN — SODIUM CHLORIDE 4 MILLILITER(S): 9 INJECTION INTRAMUSCULAR; INTRAVENOUS; SUBCUTANEOUS at 04:59

## 2019-03-23 RX ADMIN — Medication 40 MILLIGRAM(S): at 06:08

## 2019-03-23 RX ADMIN — CARVEDILOL PHOSPHATE 3.12 MILLIGRAM(S): 80 CAPSULE, EXTENDED RELEASE ORAL at 06:08

## 2019-03-23 RX ADMIN — Medication 3 MILLILITER(S): at 04:53

## 2019-03-23 RX ADMIN — SODIUM CHLORIDE 4 MILLILITER(S): 9 INJECTION INTRAMUSCULAR; INTRAVENOUS; SUBCUTANEOUS at 17:34

## 2019-03-23 RX ADMIN — Medication 2: at 17:31

## 2019-03-23 RX ADMIN — CARVEDILOL PHOSPHATE 3.12 MILLIGRAM(S): 80 CAPSULE, EXTENDED RELEASE ORAL at 17:30

## 2019-03-23 RX ADMIN — SODIUM CHLORIDE 4 MILLILITER(S): 9 INJECTION INTRAMUSCULAR; INTRAVENOUS; SUBCUTANEOUS at 11:38

## 2019-03-23 RX ADMIN — HEPARIN SODIUM 5000 UNIT(S): 5000 INJECTION INTRAVENOUS; SUBCUTANEOUS at 14:57

## 2019-03-23 RX ADMIN — HEPARIN SODIUM 5000 UNIT(S): 5000 INJECTION INTRAVENOUS; SUBCUTANEOUS at 21:27

## 2019-03-23 NOTE — PROGRESS NOTE ADULT - SUBJECTIVE AND OBJECTIVE BOX
Patient is a 88y old  Male who presents with a chief complaint of fall (23 Mar 2019 08:52)        SUBJECTIVE / OVERNIGHT EVENTS: No overnight events. Pt has no acute complaints with sons at bedside.       MEDICATIONS  (STANDING):  carvedilol 3.125 milliGRAM(s) Oral every 12 hours  dextrose 5%. 1000 milliLiter(s) (50 mL/Hr) IV Continuous <Continuous>  dextrose 50% Injectable 12.5 Gram(s) IV Push once  dextrose 50% Injectable 25 Gram(s) IV Push once  dextrose 50% Injectable 25 Gram(s) IV Push once  furosemide    Tablet 40 milliGRAM(s) Oral daily  heparin  Injectable 5000 Unit(s) SubCutaneous every 8 hours  insulin lispro (HumaLOG) corrective regimen sliding scale   SubCutaneous three times a day before meals  insulin lispro (HumaLOG) corrective regimen sliding scale   SubCutaneous at bedtime  sodium chloride 3%  Inhalation 4 milliLiter(s) Inhalation every 6 hours    MEDICATIONS  (PRN):  acetaminophen   Tablet .. 650 milliGRAM(s) Oral every 6 hours PRN Mild Pain (1 - 3), Moderate Pain (4 - 6), Severe Pain (7 - 10)  acetaminophen   Tablet .. 650 milliGRAM(s) Oral every 6 hours PRN Temp greater or equal to 38C (100.4F)  acetaminophen  Suppository .. 650 milliGRAM(s) Rectal every 6 hours PRN Temp greater or equal to 38C (100.4F)  ALBUTerol/ipratropium for Nebulization 3 milliLiter(s) Nebulizer every 6 hours PRN Shortness of Breath and/or Wheezing  dextrose 40% Gel 15 Gram(s) Oral once PRN Blood Glucose LESS THAN 70 milliGRAM(s)/deciliter  glucagon  Injectable 1 milliGRAM(s) IntraMuscular once PRN Glucose LESS THAN 70 milligrams/deciliter  QUEtiapine 25 milliGRAM(s) Oral every 6 hours PRN agitation        CAPILLARY BLOOD GLUCOSE      POCT Blood Glucose.: 106 mg/dL (23 Mar 2019 07:41)  POCT Blood Glucose.: 103 mg/dL (22 Mar 2019 21:12)  POCT Blood Glucose.: 175 mg/dL (22 Mar 2019 16:48)  POCT Blood Glucose.: 172 mg/dL (22 Mar 2019 12:00)    I&O's Summary    22 Mar 2019 07:01  -  23 Mar 2019 07:00  --------------------------------------------------------  IN: 300 mL / OUT: 1500 mL / NET: -1200 mL        PHYSICAL EXAM  GENERAL: NAD, well-developed  HEAD:  Atraumatic, Normocephalic  EYES: EOMI, PERRLA, conjunctiva and sclera clear  NECK: Supple, No JVD  CHEST/LUNG: Clear to auscultation bilaterally; No wheeze  HEART: Regular rate and rhythm; No murmurs, rubs, or gallops  ABDOMEN: Soft, Nontender, Nondistended; Bowel sounds present  EXTREMITIES:  2+ Peripheral Pulses, No clubbing, cyanosis, or edema  PSYCH: AAOx1-2  SKIN: No rashes or lesions    LABS:    03-22    143  |  109<H>  |  42<H>  ----------------------------<  62<L>  3.5   |  22  |  1.82<H>    Ca    8.2<L>      22 Mar 2019 06:15  Phos  3.5     03-22  Mg     2.5     03-22                RADIOLOGY & ADDITIONAL TESTS:    Imaging Personally Reviewed:  Consultant(s) Notes Reviewed:    Care Discussed with Consultants/Other Providers:

## 2019-03-23 NOTE — PROGRESS NOTE ADULT - SUBJECTIVE AND OBJECTIVE BOX
Subjective: Patient seen and examined. No new events except as noted.     SUBJECTIVE/ROS:  No chest pain, dyspnea, palpitation, or dizziness.       MEDICATIONS:  MEDICATIONS  (STANDING):  carvedilol 3.125 milliGRAM(s) Oral every 12 hours  dextrose 5%. 1000 milliLiter(s) (50 mL/Hr) IV Continuous <Continuous>  dextrose 50% Injectable 12.5 Gram(s) IV Push once  dextrose 50% Injectable 25 Gram(s) IV Push once  dextrose 50% Injectable 25 Gram(s) IV Push once  furosemide    Tablet 40 milliGRAM(s) Oral daily  heparin  Injectable 5000 Unit(s) SubCutaneous every 8 hours  insulin lispro (HumaLOG) corrective regimen sliding scale   SubCutaneous three times a day before meals  insulin lispro (HumaLOG) corrective regimen sliding scale   SubCutaneous at bedtime  sodium chloride 3%  Inhalation 4 milliLiter(s) Inhalation every 6 hours      PHYSICAL EXAM:  T(C): 36.6 (03-23-19 @ 06:02), Max: 36.7 (03-22-19 @ 11:51)  HR: 79 (03-23-19 @ 06:02) (76 - 89)  BP: 149/84 (03-23-19 @ 06:02) (132/90 - 149/84)  RR: 18 (03-23-19 @ 06:02) (17 - 18)  SpO2: 98% (03-23-19 @ 06:02) (97% - 98%)  Wt(kg): --  I&O's Summary    22 Mar 2019 07:01  -  23 Mar 2019 07:00  --------------------------------------------------------  IN: 300 mL / OUT: 1500 mL / NET: -1200 mL            JVP: Normal  Neck: supple  Lung: clear   CV: S1 S2 , Murmur:  Abd: soft  Ext: No edema  neuro: Awake / alert  Psych: flat affect  Skin: normal``    LABS/DATA:    CARDIAC MARKERS:            03-22    143  |  109<H>  |  42<H>  ----------------------------<  62<L>  3.5   |  22  |  1.82<H>    Ca    8.2<L>      22 Mar 2019 06:15  Phos  3.5     03-22  Mg     2.5     03-22      proBNP:   Lipid Profile:   HgA1c:   TSH:     TELE:  EKG:

## 2019-03-23 NOTE — PROGRESS NOTE ADULT - SUBJECTIVE AND OBJECTIVE BOX
Orthopaedic Surgery Progress Note    Subjective:   Patient seen and examined  No acute events overnight    Objective:  T(C): 36.6 (03-22-19 @ 20:19), Max: 36.7 (03-22-19 @ 11:51)  HR: 80 (03-23-19 @ 04:54) (76 - 89)  BP: 135/87 (03-22-19 @ 20:19) (132/90 - 148/80)  RR: 18 (03-22-19 @ 20:19) (16 - 18)  SpO2: 98% (03-23-19 @ 04:54) (97% - 98%)  Wt(kg): --    03-21 @ 07:01  -  03-22 @ 07:00  --------------------------------------------------------  IN: 0 mL / OUT: 1380 mL / NET: -1380 mL    03-22 @ 07:01  -  03-23 @ 05:38  --------------------------------------------------------  IN: 150 mL / OUT: 1500 mL / NET: -1350 mL        PE    Gen: NAD  LLE  skin intact  shortened/ER  patient non-compliant with exam  WWP distally                          12.8   5.02  )-----------( 226      ( 21 Mar 2019 07:50 )             40.4     03-22    143  |  109<H>  |  42<H>  ----------------------------<  62<L>  3.5   |  22  |  1.82<H>    Ca    8.2<L>      22 Mar 2019 06:15  Phos  3.5     03-22  Mg     2.5     03-22    A/P 88y year old male s/p with L femoral neck fracture and multifocal PNA currently on BiPAP and HAVEN.    Sivakumar vs girdlestone procedure 3/25   FU medical clearance  Pain Control  DVT PPX per primary  TTWB LLE  Continue abx and supportive care for PNA  DNR/DNI

## 2019-03-23 NOTE — PROGRESS NOTE ADULT - ATTENDING COMMENTS
I agree with the above note on this patient. All pertinent films have been reviewed. Please refer to clinical documentation of the history, physical examinations, data summary, and both assessment and plan as documented above and with which I agree.    Abdiel Shabazz MD  Attending Orthopedic Surgeon

## 2019-03-23 NOTE — PROGRESS NOTE ADULT - PROBLEM SELECTOR PLAN 2
- initial Cr .86,   - likely prerenal, possible contrast induced nephropathy from CT, Renal function improving  - making urine, strict I&Os  - urine lytes   - renally dose meds   -Renal input appreciated.

## 2019-03-23 NOTE — PROGRESS NOTE ADULT - ASSESSMENT
Assessment and Plan:   Pt is a 87yo [place]nian-speaking M with PMHx of DM, hard of hearing presenting for presented with AMS and respiratory distress following an unwitnessed fall, found to have L femoral neck fracture, sepsis likely 2/2 multifocal PNA, and acute Hypoxic and hypercarbic respiratory failure; improved on BIPAP and abx    #Acute Hypoxic Respiratory Failure,     # R/O Sepsis  r/o  PNA, doubt about PNA given resolution of opacities on CXR in 1 week.   blood cultures negative  d/c all abx  No signs of infection at this time.     #Hyperglycemia, resolved. Now hypoglycemic  -D/C Lantus  -FSSS with Insulin coverage

## 2019-03-23 NOTE — PROGRESS NOTE ADULT - ASSESSMENT
acute diastolic chf  pulm HTN  stable  cont current meds    HAVEN  improving   follow up with renal     DM  Monitor finger stick. Insulin coverage. Diabetic education and Diabetic diet. Consider nutrition consultation.    elevated trop  stress induced in setting of acute CHF    PreOP for Hip fx  Based on current ACC/AHA guidelines, patient history and physical exam, the patient is considered to have High risk   pt is optimized as much possible for this time sensitive surgery

## 2019-03-24 ENCOUNTER — TRANSCRIPTION ENCOUNTER (OUTPATIENT)
Age: 84
End: 2019-03-24

## 2019-03-24 LAB
ANION GAP SERPL CALC-SCNC: 15 MMO/L — HIGH (ref 7–14)
BUN SERPL-MCNC: 40 MG/DL — HIGH (ref 7–23)
CALCIUM SERPL-MCNC: 8.5 MG/DL — SIGNIFICANT CHANGE UP (ref 8.4–10.5)
CHLORIDE SERPL-SCNC: 105 MMOL/L — SIGNIFICANT CHANGE UP (ref 98–107)
CO2 SERPL-SCNC: 21 MMOL/L — LOW (ref 22–31)
CREAT SERPL-MCNC: 1.73 MG/DL — HIGH (ref 0.5–1.3)
GLUCOSE BLDC GLUCOMTR-MCNC: 155 MG/DL — HIGH (ref 70–99)
GLUCOSE BLDC GLUCOMTR-MCNC: 163 MG/DL — HIGH (ref 70–99)
GLUCOSE BLDC GLUCOMTR-MCNC: 172 MG/DL — HIGH (ref 70–99)
GLUCOSE BLDC GLUCOMTR-MCNC: 196 MG/DL — HIGH (ref 70–99)
GLUCOSE SERPL-MCNC: 185 MG/DL — HIGH (ref 70–99)
HCT VFR BLD CALC: 34.1 % — LOW (ref 39–50)
HGB BLD-MCNC: 11 G/DL — LOW (ref 13–17)
MAGNESIUM SERPL-MCNC: 2.4 MG/DL — SIGNIFICANT CHANGE UP (ref 1.6–2.6)
MCHC RBC-ENTMCNC: 29.7 PG — SIGNIFICANT CHANGE UP (ref 27–34)
MCHC RBC-ENTMCNC: 32.3 % — SIGNIFICANT CHANGE UP (ref 32–36)
MCV RBC AUTO: 92.2 FL — SIGNIFICANT CHANGE UP (ref 80–100)
NRBC # FLD: 0 K/UL — LOW (ref 25–125)
PHOSPHATE SERPL-MCNC: 3.5 MG/DL — SIGNIFICANT CHANGE UP (ref 2.5–4.5)
PLATELET # BLD AUTO: 287 K/UL — SIGNIFICANT CHANGE UP (ref 150–400)
PMV BLD: 9.2 FL — SIGNIFICANT CHANGE UP (ref 7–13)
POTASSIUM SERPL-MCNC: 4.1 MMOL/L — SIGNIFICANT CHANGE UP (ref 3.5–5.3)
POTASSIUM SERPL-SCNC: 4.1 MMOL/L — SIGNIFICANT CHANGE UP (ref 3.5–5.3)
RBC # BLD: 3.7 M/UL — LOW (ref 4.2–5.8)
RBC # FLD: 12.8 % — SIGNIFICANT CHANGE UP (ref 10.3–14.5)
SODIUM SERPL-SCNC: 141 MMOL/L — SIGNIFICANT CHANGE UP (ref 135–145)
WBC # BLD: 5.53 K/UL — SIGNIFICANT CHANGE UP (ref 3.8–10.5)
WBC # FLD AUTO: 5.53 K/UL — SIGNIFICANT CHANGE UP (ref 3.8–10.5)

## 2019-03-24 PROCEDURE — 99233 SBSQ HOSP IP/OBS HIGH 50: CPT

## 2019-03-24 PROCEDURE — 99232 SBSQ HOSP IP/OBS MODERATE 35: CPT | Mod: GC,57

## 2019-03-24 RX ORDER — SODIUM CHLORIDE 9 MG/ML
1000 INJECTION INTRAMUSCULAR; INTRAVENOUS; SUBCUTANEOUS
Qty: 0 | Refills: 0 | Status: DISCONTINUED | OUTPATIENT
Start: 2019-03-24 | End: 2019-03-26

## 2019-03-24 RX ADMIN — CARVEDILOL PHOSPHATE 3.12 MILLIGRAM(S): 80 CAPSULE, EXTENDED RELEASE ORAL at 17:19

## 2019-03-24 RX ADMIN — Medication 2: at 11:33

## 2019-03-24 RX ADMIN — Medication 2: at 08:21

## 2019-03-24 RX ADMIN — CARVEDILOL PHOSPHATE 3.12 MILLIGRAM(S): 80 CAPSULE, EXTENDED RELEASE ORAL at 05:44

## 2019-03-24 RX ADMIN — HEPARIN SODIUM 5000 UNIT(S): 5000 INJECTION INTRAVENOUS; SUBCUTANEOUS at 05:44

## 2019-03-24 RX ADMIN — Medication 40 MILLIGRAM(S): at 05:44

## 2019-03-24 RX ADMIN — Medication 2: at 17:19

## 2019-03-24 RX ADMIN — HEPARIN SODIUM 5000 UNIT(S): 5000 INJECTION INTRAVENOUS; SUBCUTANEOUS at 14:57

## 2019-03-24 RX ADMIN — SODIUM CHLORIDE 4 MILLILITER(S): 9 INJECTION INTRAMUSCULAR; INTRAVENOUS; SUBCUTANEOUS at 11:36

## 2019-03-24 RX ADMIN — SODIUM CHLORIDE 4 MILLILITER(S): 9 INJECTION INTRAMUSCULAR; INTRAVENOUS; SUBCUTANEOUS at 17:11

## 2019-03-24 RX ADMIN — SODIUM CHLORIDE 4 MILLILITER(S): 9 INJECTION INTRAMUSCULAR; INTRAVENOUS; SUBCUTANEOUS at 23:09

## 2019-03-24 NOTE — PROGRESS NOTE ADULT - PROBLEM SELECTOR PLAN 2
Renal function improving. Debi new baseline  - making urine, strict I&Os  - renally dose meds   -Renal input appreciated.

## 2019-03-24 NOTE — PROGRESS NOTE ADULT - PROBLEM SELECTOR PLAN 8
DVT PPX:HSQ    - Advanced Directive: Pt is DNR/DNI, MOLST in chart    The patient is considered to have High risk for a intermediate to high risk surgery  pt is optimized as much possible for this time sensitive surgery and may proceed to OR DVT PPX:HSQ    - Advanced Directive: Pt is DNR/DNI, MOLST in chart    The patient is considered to have High risk for a intermediate risk surgery  pt is optimized as much possible for this time sensitive surgery and may proceed to OR

## 2019-03-24 NOTE — PROGRESS NOTE ADULT - SUBJECTIVE AND OBJECTIVE BOX
No acute events overnight. Pain well controlled with pain medications. Consented son Shaylee for hemiarthroplasty yesterday. Plan for OR on 3/25/19.    Vital Signs Last 24 Hrs  T(C): 36.9 (23 Mar 2019 21:29), Max: 36.9 (23 Mar 2019 21:29)  T(F): 98.5 (23 Mar 2019 21:29), Max: 98.5 (23 Mar 2019 21:29)  HR: 79 (23 Mar 2019 21:29) (75 - 87)  BP: 139/84 (23 Mar 2019 21:29) (124/72 - 149/84)  BP(mean): --  RR: 18 (23 Mar 2019 21:29) (18 - 18)  SpO2: 99% (23 Mar 2019 21:29) (93% - 100%)    Exam:  Gen: NAD  Motor: grossly intact  Sensory: grossly intact  Skin intact    A/P: 88 year old male with L femoral neck fracture  - Pain Control  - NWB LLE  - NPO at midnight  - OR planning for 3/25/19  - Medical clearance

## 2019-03-24 NOTE — PROGRESS NOTE ADULT - ATTENDING COMMENTS
I agree with the above note and have personally seen and examined this patient. All pertinent films have been reviewed. Please refer to clinical documentation of the history, physical examinations, data summary, and both assessment and plan as documented above and with which I agree.    OR tomorrow for L hip hemiarthroplasty, family understands significantly increased risks as previously documented in my notes for wound infection, wound breakdown, dvt/pe, no guarantee made that patient will ambulate, risk of dislocation, risk of fracture, risk of death (intraop or postop) given complex past medical history and prolonged time it took to optimize this patient for the operating room  medical and cardiac clearance in chart  consent in chart, completed by HCP  npo after midnight    Abdiel Shabazz MD  Attending Orthopedic Surgeon

## 2019-03-24 NOTE — CHART NOTE - NSCHARTNOTEFT_GEN_A_CORE
Spoke with son Shaylee Flor ([phone]) who has signed consent on father's behalf for OR tomorrow. Son filled out HCP form over the phone and with his other borther, nurse and myself as witness to father's decision to make son Shaylee his HCP.    Also spoke with the medical fellow in regards to clearing the patient (risk stratification) for OR tomorrow.

## 2019-03-24 NOTE — PROGRESS NOTE ADULT - SUBJECTIVE AND OBJECTIVE BOX
Patient is a 88y old  Male who presents with a chief complaint of fall (24 Mar 2019 07:53)        SUBJECTIVE / OVERNIGHT EVENTS: No complaints      MEDICATIONS  (STANDING):  carvedilol 3.125 milliGRAM(s) Oral every 12 hours  dextrose 5%. 1000 milliLiter(s) (50 mL/Hr) IV Continuous <Continuous>  dextrose 50% Injectable 12.5 Gram(s) IV Push once  dextrose 50% Injectable 25 Gram(s) IV Push once  dextrose 50% Injectable 25 Gram(s) IV Push once  furosemide    Tablet 40 milliGRAM(s) Oral daily  heparin  Injectable 5000 Unit(s) SubCutaneous every 8 hours  insulin lispro (HumaLOG) corrective regimen sliding scale   SubCutaneous three times a day before meals  insulin lispro (HumaLOG) corrective regimen sliding scale   SubCutaneous at bedtime  sodium chloride 3%  Inhalation 4 milliLiter(s) Inhalation every 6 hours    MEDICATIONS  (PRN):  acetaminophen   Tablet .. 650 milliGRAM(s) Oral every 6 hours PRN Mild Pain (1 - 3), Moderate Pain (4 - 6), Severe Pain (7 - 10)  acetaminophen   Tablet .. 650 milliGRAM(s) Oral every 6 hours PRN Temp greater or equal to 38C (100.4F)  acetaminophen  Suppository .. 650 milliGRAM(s) Rectal every 6 hours PRN Temp greater or equal to 38C (100.4F)  ALBUTerol/ipratropium for Nebulization 3 milliLiter(s) Nebulizer every 6 hours PRN Shortness of Breath and/or Wheezing  dextrose 40% Gel 15 Gram(s) Oral once PRN Blood Glucose LESS THAN 70 milliGRAM(s)/deciliter  glucagon  Injectable 1 milliGRAM(s) IntraMuscular once PRN Glucose LESS THAN 70 milligrams/deciliter  QUEtiapine 25 milliGRAM(s) Oral every 6 hours PRN agitation        CAPILLARY BLOOD GLUCOSE      POCT Blood Glucose.: 196 mg/dL (24 Mar 2019 11:25)  POCT Blood Glucose.: 172 mg/dL (24 Mar 2019 07:30)  POCT Blood Glucose.: 199 mg/dL (23 Mar 2019 22:39)  POCT Blood Glucose.: 194 mg/dL (23 Mar 2019 16:32)  POCT Blood Glucose.: 119 mg/dL (23 Mar 2019 11:48)    I&O's Summary    23 Mar 2019 07:01  -  24 Mar 2019 07:00  --------------------------------------------------------  IN: 150 mL / OUT: 1300 mL / NET: -1150 mL        PHYSICAL EXAM  GENERAL: NAD, well-developed  HEAD:  Atraumatic, Normocephalic  EYES: EOMI, PERRLA, conjunctiva and sclera clear  NECK: Supple, No JVD  CHEST/LUNG: Clear to auscultation bilaterally; No wheeze  HEART: Regular rate and rhythm; No murmurs, rubs, or gallops  ABDOMEN: Soft, Nontender, Nondistended; Bowel sounds present  EXTREMITIES:  2+ Peripheral Pulses, No clubbing, cyanosis, or edema  PSYCH: AAOx1-2  SKIN: No rashes or lesions    LABS:                        11.0   5.53  )-----------( 287      ( 24 Mar 2019 05:56 )             34.1     03-24    141  |  105  |  40<H>  ----------------------------<  185<H>  4.1   |  21<L>  |  1.73<H>    Ca    8.5      24 Mar 2019 05:56  Phos  3.5     03-24  Mg     2.4     03-24                RADIOLOGY & ADDITIONAL TESTS:    Imaging Personally Reviewed:  Consultant(s) Notes Reviewed:    Care Discussed with Consultants/Other Providers:

## 2019-03-24 NOTE — PROGRESS NOTE ADULT - SUBJECTIVE AND OBJECTIVE BOX
Subjective: Patient seen and examined. No new events except as noted.     SUBJECTIVE/ROS:  resting , flat   NAD  family at bedside       MEDICATIONS:  MEDICATIONS  (STANDING):  carvedilol 3.125 milliGRAM(s) Oral every 12 hours  dextrose 5%. 1000 milliLiter(s) (50 mL/Hr) IV Continuous <Continuous>  dextrose 50% Injectable 12.5 Gram(s) IV Push once  dextrose 50% Injectable 25 Gram(s) IV Push once  dextrose 50% Injectable 25 Gram(s) IV Push once  furosemide    Tablet 40 milliGRAM(s) Oral daily  heparin  Injectable 5000 Unit(s) SubCutaneous every 8 hours  insulin lispro (HumaLOG) corrective regimen sliding scale   SubCutaneous three times a day before meals  insulin lispro (HumaLOG) corrective regimen sliding scale   SubCutaneous at bedtime  sodium chloride 3%  Inhalation 4 milliLiter(s) Inhalation every 6 hours      PHYSICAL EXAM:  T(C): 36.8 (03-24-19 @ 05:43), Max: 36.9 (03-23-19 @ 21:29)  HR: 82 (03-24-19 @ 05:43) (75 - 87)  BP: 123/79 (03-24-19 @ 05:43) (123/79 - 139/84)  RR: 18 (03-24-19 @ 05:43) (18 - 18)  SpO2: 99% (03-24-19 @ 05:43) (93% - 100%)  Wt(kg): --  I&O's Summary    23 Mar 2019 07:01  -  24 Mar 2019 07:00  --------------------------------------------------------  IN: 150 mL / OUT: 1300 mL / NET: -1150 mL            JVP: Normal  Neck: supple  Lung: clear   CV: S1 S2 , Murmur:  Abd: soft  Ext: No edema  neuro: Awake / alert  Psych: flat affect  Skin: normal``    LABS/DATA:    CARDIAC MARKERS:                                11.0   5.53  )-----------( 287      ( 24 Mar 2019 05:56 )             34.1     03-24    141  |  105  |  40<H>  ----------------------------<  185<H>  4.1   |  21<L>  |  1.73<H>    Ca    8.5      24 Mar 2019 05:56  Phos  3.5     03-24  Mg     2.4     03-24      proBNP:   Lipid Profile:   HgA1c:   TSH:     TELE:  EKG:

## 2019-03-24 NOTE — PROGRESS NOTE ADULT - ASSESSMENT
89yo [place]nian-speaking M with PMHx of DM, hard of hearing presenting for presented with AMS and respiratory distress following an unwitnessed fall, found to have L femoral neck fracture, sepsis likely 2/2 multifocal PNA, and acute Hypoxic and hypercarbic respiratory failure; improved on BIPAP and abx

## 2019-03-24 NOTE — PROGRESS NOTE ADULT - ASSESSMENT
acute diastolic chf  pulm HTN  stable  cont current meds    HAVEN  improving     DM  Monitor finger stick. Insulin coverage.    elevated trop  stress induced in setting of acute CHF    PreOP for Hip fx  Based on current ACC/AHA guidelines, patient history and physical exam, the patient is considered to have High risk   pt is optimized as much possible for this time sensitive surgery and may proceed to OR

## 2019-03-25 ENCOUNTER — RESULT REVIEW (OUTPATIENT)
Age: 84
End: 2019-03-25

## 2019-03-25 ENCOUNTER — APPOINTMENT (OUTPATIENT)
Dept: ORTHOPEDIC SURGERY | Facility: HOSPITAL | Age: 84
End: 2019-03-25

## 2019-03-25 DIAGNOSIS — N14.1 NEPHROPATHY INDUCED BY OTHER DRUGS, MEDICAMENTS AND BIOLOGICAL SUBSTANCES: ICD-10-CM

## 2019-03-25 LAB
ALBUMIN SERPL ELPH-MCNC: 2.8 G/DL — LOW (ref 3.3–5)
ALP SERPL-CCNC: 68 U/L — SIGNIFICANT CHANGE UP (ref 40–120)
ALT FLD-CCNC: 32 U/L — SIGNIFICANT CHANGE UP (ref 4–41)
ANION GAP SERPL CALC-SCNC: 13 MMO/L — SIGNIFICANT CHANGE UP (ref 7–14)
ANION GAP SERPL CALC-SCNC: 14 MMO/L — SIGNIFICANT CHANGE UP (ref 7–14)
APTT BLD: 26 SEC — LOW (ref 27.5–36.3)
AST SERPL-CCNC: 30 U/L — SIGNIFICANT CHANGE UP (ref 4–40)
BASE EXCESS BLDA CALC-SCNC: -2.6 MMOL/L — SIGNIFICANT CHANGE UP
BILIRUB SERPL-MCNC: 0.6 MG/DL — SIGNIFICANT CHANGE UP (ref 0.2–1.2)
BLD GP AB SCN SERPL QL: NEGATIVE — SIGNIFICANT CHANGE UP
BUN SERPL-MCNC: 40 MG/DL — HIGH (ref 7–23)
BUN SERPL-MCNC: 41 MG/DL — HIGH (ref 7–23)
CA-I BLDA-SCNC: 1.16 MMOL/L — SIGNIFICANT CHANGE UP (ref 1.15–1.29)
CALCIUM SERPL-MCNC: 8 MG/DL — LOW (ref 8.4–10.5)
CALCIUM SERPL-MCNC: 8.5 MG/DL — SIGNIFICANT CHANGE UP (ref 8.4–10.5)
CHLORIDE SERPL-SCNC: 105 MMOL/L — SIGNIFICANT CHANGE UP (ref 98–107)
CHLORIDE SERPL-SCNC: 108 MMOL/L — HIGH (ref 98–107)
CO2 SERPL-SCNC: 20 MMOL/L — LOW (ref 22–31)
CO2 SERPL-SCNC: 20 MMOL/L — LOW (ref 22–31)
CREAT SERPL-MCNC: 1.53 MG/DL — HIGH (ref 0.5–1.3)
CREAT SERPL-MCNC: 1.61 MG/DL — HIGH (ref 0.5–1.3)
GLUCOSE BLDA-MCNC: 218 MG/DL — HIGH (ref 70–99)
GLUCOSE BLDC GLUCOMTR-MCNC: 151 MG/DL — HIGH (ref 70–99)
GLUCOSE BLDC GLUCOMTR-MCNC: 156 MG/DL — HIGH (ref 70–99)
GLUCOSE BLDC GLUCOMTR-MCNC: 202 MG/DL — HIGH (ref 70–99)
GLUCOSE SERPL-MCNC: 166 MG/DL — HIGH (ref 70–99)
GLUCOSE SERPL-MCNC: 222 MG/DL — HIGH (ref 70–99)
HCO3 BLDA-SCNC: 22 MMOL/L — SIGNIFICANT CHANGE UP (ref 22–26)
HCT VFR BLD CALC: 31.1 % — LOW (ref 39–50)
HCT VFR BLD CALC: 36.3 % — LOW (ref 39–50)
HCT VFR BLDA CALC: 31.7 % — LOW (ref 39–51)
HGB BLD-MCNC: 11.4 G/DL — LOW (ref 13–17)
HGB BLD-MCNC: 9.9 G/DL — LOW (ref 13–17)
HGB BLDA-MCNC: 10.3 G/DL — LOW (ref 13–17)
INR BLD: 1.05 — SIGNIFICANT CHANGE UP (ref 0.88–1.17)
MAGNESIUM SERPL-MCNC: 2.5 MG/DL — SIGNIFICANT CHANGE UP (ref 1.6–2.6)
MCHC RBC-ENTMCNC: 29.3 PG — SIGNIFICANT CHANGE UP (ref 27–34)
MCHC RBC-ENTMCNC: 29.9 PG — SIGNIFICANT CHANGE UP (ref 27–34)
MCHC RBC-ENTMCNC: 31.4 % — LOW (ref 32–36)
MCHC RBC-ENTMCNC: 31.8 % — LOW (ref 32–36)
MCV RBC AUTO: 92 FL — SIGNIFICANT CHANGE UP (ref 80–100)
MCV RBC AUTO: 95.3 FL — SIGNIFICANT CHANGE UP (ref 80–100)
NRBC # FLD: 0 K/UL — LOW (ref 25–125)
NRBC # FLD: 0 K/UL — SIGNIFICANT CHANGE UP (ref 0–0)
PCO2 BLDA: 39 MMHG — SIGNIFICANT CHANGE UP (ref 35–48)
PH BLDA: 7.37 PH — SIGNIFICANT CHANGE UP (ref 7.35–7.45)
PHOSPHATE SERPL-MCNC: 3.8 MG/DL — SIGNIFICANT CHANGE UP (ref 2.5–4.5)
PLATELET # BLD AUTO: 114 K/UL — LOW (ref 150–400)
PLATELET # BLD AUTO: 253 K/UL — SIGNIFICANT CHANGE UP (ref 150–400)
PMV BLD: 10.5 FL — SIGNIFICANT CHANGE UP (ref 7–13)
PMV BLD: 10.7 FL — SIGNIFICANT CHANGE UP (ref 7–13)
PO2 BLDA: 212 MMHG — HIGH (ref 83–108)
POTASSIUM BLDA-SCNC: 3.8 MMOL/L — SIGNIFICANT CHANGE UP (ref 3.4–4.5)
POTASSIUM SERPL-MCNC: 4.1 MMOL/L — SIGNIFICANT CHANGE UP (ref 3.5–5.3)
POTASSIUM SERPL-MCNC: 4.5 MMOL/L — SIGNIFICANT CHANGE UP (ref 3.5–5.3)
POTASSIUM SERPL-SCNC: 4.1 MMOL/L — SIGNIFICANT CHANGE UP (ref 3.5–5.3)
POTASSIUM SERPL-SCNC: 4.5 MMOL/L — SIGNIFICANT CHANGE UP (ref 3.5–5.3)
PROT SERPL-MCNC: 6.4 G/DL — SIGNIFICANT CHANGE UP (ref 6–8.3)
PROTHROM AB SERPL-ACNC: 11.7 SEC — SIGNIFICANT CHANGE UP (ref 9.8–13.1)
RBC # BLD: 3.38 M/UL — LOW (ref 4.2–5.8)
RBC # BLD: 3.81 M/UL — LOW (ref 4.2–5.8)
RBC # FLD: 12.7 % — SIGNIFICANT CHANGE UP (ref 10.3–14.5)
RBC # FLD: 12.8 % — SIGNIFICANT CHANGE UP (ref 10.3–14.5)
RH IG SCN BLD-IMP: NEGATIVE — SIGNIFICANT CHANGE UP
SAO2 % BLDA: 99.3 % — HIGH (ref 95–99)
SODIUM BLDA-SCNC: 138 MMOL/L — SIGNIFICANT CHANGE UP (ref 136–146)
SODIUM SERPL-SCNC: 139 MMOL/L — SIGNIFICANT CHANGE UP (ref 135–145)
SODIUM SERPL-SCNC: 141 MMOL/L — SIGNIFICANT CHANGE UP (ref 135–145)
WBC # BLD: 5.29 K/UL — SIGNIFICANT CHANGE UP (ref 3.8–10.5)
WBC # BLD: 6.63 K/UL — SIGNIFICANT CHANGE UP (ref 3.8–10.5)
WBC # FLD AUTO: 5.29 K/UL — SIGNIFICANT CHANGE UP (ref 3.8–10.5)
WBC # FLD AUTO: 6.63 K/UL — SIGNIFICANT CHANGE UP (ref 3.8–10.5)

## 2019-03-25 PROCEDURE — 99233 SBSQ HOSP IP/OBS HIGH 50: CPT | Mod: GC

## 2019-03-25 PROCEDURE — 71045 X-RAY EXAM CHEST 1 VIEW: CPT | Mod: 26

## 2019-03-25 PROCEDURE — 72170 X-RAY EXAM OF PELVIS: CPT | Mod: 26

## 2019-03-25 PROCEDURE — 88311 DECALCIFY TISSUE: CPT | Mod: 26

## 2019-03-25 PROCEDURE — 88305 TISSUE EXAM BY PATHOLOGIST: CPT | Mod: 26

## 2019-03-25 PROCEDURE — 99233 SBSQ HOSP IP/OBS HIGH 50: CPT

## 2019-03-25 PROCEDURE — 27236 TREAT THIGH FRACTURE: CPT | Mod: LT

## 2019-03-25 RX ORDER — CEFAZOLIN SODIUM 1 G
2000 VIAL (EA) INJECTION EVERY 8 HOURS
Qty: 0 | Refills: 0 | Status: COMPLETED | OUTPATIENT
Start: 2019-03-26 | End: 2019-03-26

## 2019-03-25 RX ADMIN — SODIUM CHLORIDE 4 MILLILITER(S): 9 INJECTION INTRAMUSCULAR; INTRAVENOUS; SUBCUTANEOUS at 04:11

## 2019-03-25 RX ADMIN — Medication 2: at 12:00

## 2019-03-25 RX ADMIN — Medication 2: at 07:49

## 2019-03-25 RX ADMIN — CARVEDILOL PHOSPHATE 3.12 MILLIGRAM(S): 80 CAPSULE, EXTENDED RELEASE ORAL at 06:10

## 2019-03-25 RX ADMIN — SODIUM CHLORIDE 50 MILLILITER(S): 9 INJECTION INTRAMUSCULAR; INTRAVENOUS; SUBCUTANEOUS at 21:41

## 2019-03-25 RX ADMIN — Medication 40 MILLIGRAM(S): at 06:10

## 2019-03-25 NOTE — PROGRESS NOTE ADULT - SUBJECTIVE AND OBJECTIVE BOX
Patient is a 88y old  Male who presents with a chief complaint of fall (25 Mar 2019 11:18)      SUBJECTIVE / OVERNIGHT EVENTS:  Pt seen earlier, no problems reported.  Resting comfortably in bed, no complaints.    MEDICATIONS  (STANDING):  carvedilol 3.125 milliGRAM(s) Oral every 12 hours  dextrose 5%. 1000 milliLiter(s) (50 mL/Hr) IV Continuous <Continuous>  dextrose 50% Injectable 12.5 Gram(s) IV Push once  dextrose 50% Injectable 25 Gram(s) IV Push once  dextrose 50% Injectable 25 Gram(s) IV Push once  furosemide    Tablet 40 milliGRAM(s) Oral daily  insulin lispro (HumaLOG) corrective regimen sliding scale   SubCutaneous three times a day before meals  insulin lispro (HumaLOG) corrective regimen sliding scale   SubCutaneous at bedtime  sodium chloride 0.9%. 1000 milliLiter(s) (50 mL/Hr) IV Continuous <Continuous>  sodium chloride 3%  Inhalation 4 milliLiter(s) Inhalation every 6 hours    MEDICATIONS  (PRN):  acetaminophen   Tablet .. 650 milliGRAM(s) Oral every 6 hours PRN Mild Pain (1 - 3), Moderate Pain (4 - 6), Severe Pain (7 - 10)  acetaminophen   Tablet .. 650 milliGRAM(s) Oral every 6 hours PRN Temp greater or equal to 38C (100.4F)  acetaminophen  Suppository .. 650 milliGRAM(s) Rectal every 6 hours PRN Temp greater or equal to 38C (100.4F)  ALBUTerol/ipratropium for Nebulization 3 milliLiter(s) Nebulizer every 6 hours PRN Shortness of Breath and/or Wheezing  dextrose 40% Gel 15 Gram(s) Oral once PRN Blood Glucose LESS THAN 70 milliGRAM(s)/deciliter  glucagon  Injectable 1 milliGRAM(s) IntraMuscular once PRN Glucose LESS THAN 70 milligrams/deciliter  QUEtiapine 25 milliGRAM(s) Oral every 6 hours PRN agitation      CAPILLARY BLOOD GLUCOSE      POCT Blood Glucose.: 156 mg/dL (25 Mar 2019 11:57)  POCT Blood Glucose.: 151 mg/dL (25 Mar 2019 07:36)  POCT Blood Glucose.: 155 mg/dL (24 Mar 2019 21:19)      I&O's Summary    24 Mar 2019 07:01  -  25 Mar 2019 07:00  --------------------------------------------------------  IN: 120 mL / OUT: 1300 mL / NET: -1180 mL        Vital Signs Last 24 Hrs  T(C): 36.4 (25 Mar 2019 13:49), Max: 37 (25 Mar 2019 04:25)  T(F): 97.5 (25 Mar 2019 13:49), Max: 98.6 (25 Mar 2019 04:25)  HR: 80 (25 Mar 2019 13:49) (74 - 85)  BP: 118/63 (25 Mar 2019 13:49) (118/63 - 143/78)  BP(mean): --  RR: 18 (25 Mar 2019 13:49) (18 - 18)  SpO2: 99% (25 Mar 2019 13:49) (96% - 99%)    PHYSICAL EXAM:  GENERAL: elderly M, lying flat in bed on R side  HEAD:  Atraumatic, Normocephalic  EYES: EOMI, PERRLA, conjunctiva and sclera clear  NECK: Supple, No JVD  CHEST/LUNG: Clear to auscultation bilaterally; No wheeze  HEART: Regular rate and rhythm; No murmurs, rubs, or gallops  ABDOMEN: Soft, Nontender, Nondistended; Bowel sounds present  EXTREMITIES:  2+ Peripheral Pulses, No clubbing, cyanosis, or edema  PSYCH: AAOx1  NEUROLOGY: non-focal  SKIN: No rashes or lesions    LABS:                        11.4   5.29  )-----------( 253      ( 25 Mar 2019 04:20 )             36.3     03-25    139  |  105  |  41<H>  ----------------------------<  166<H>  4.5   |  20<L>  |  1.61<H>    Ca    8.5      25 Mar 2019 04:20  Phos  3.8     03-25  Mg     2.5     03-25    TPro  6.4  /  Alb  2.8<L>  /  TBili  0.6  /  DBili  x   /  AST  30  /  ALT  32  /  AlkPhos  68  03-25    PT/INR - ( 25 Mar 2019 04:20 )   PT: 11.7 SEC;   INR: 1.05          PTT - ( 25 Mar 2019 04:20 )  PTT:26.0 SEC          RADIOLOGY & ADDITIONAL TESTS:    Imaging Personally Reviewed:    Consultant(s) Notes Reviewed:  cards, renal, ortho    Care Discussed with Consultants/Other Providers: RN, SW, CM, ADS re overall care

## 2019-03-25 NOTE — PROGRESS NOTE ADULT - ASSESSMENT
acute diastolic chf  pulm HTN  stable  cont current meds    HAVEN  stable     DM  Monitor finger stick. Insulin coverage.    elevated trop  stress induced in setting of acute CHF    PreOP for Hip fx  Based on current ACC/AHA guidelines, patient history and physical exam, the patient is considered to have High risk   pt is optimized as much possible for this time sensitive surgery and may proceed to OR

## 2019-03-25 NOTE — PROGRESS NOTE ADULT - SUBJECTIVE AND OBJECTIVE BOX
[facility] Division of Kidney Diseases & Hypertension  FOLLOW UP NOTE  [phone]--------------------------------------------------------------------------------  Chief Complaint:Respiratory failure    88 year old male with HLD, DVT s/p AC, dementia and hearing loss who presents to the hospital after a fall and hip fracture. Patient noted to have PNA on antibiotics and also hypercarbic respiratory failure requiring BiPAP at night. Patient's creatinine noted to be 2.44 and uptrending and was normal on admission. On admission patient received contrast load as well and creatinine started to climb after that. Nephrology consulted for HAVEN.    Patient seen and examined at bedside. Resting comfortably in bed, laying flat. Unable to obtain ROS from patient.      PAST HISTORY  --------------------------------------------------------------------------------  No significant changes to PMH, PSH, FHx, SHx, unless otherwise noted    ALLERGIES & MEDICATIONS  --------------------------------------------------------------------------------  Allergies    No Known Allergies    Intolerances      Standing Inpatient Medications  carvedilol 3.125 milliGRAM(s) Oral every 12 hours  furosemide    Tablet 40 milliGRAM(s) Oral daily  insulin lispro (HumaLOG) corrective regimen sliding scale   SubCutaneous three times a day before meals  insulin lispro (HumaLOG) corrective regimen sliding scale   SubCutaneous at bedtime  sodium chloride 0.9%. 1000 milliLiter(s) IV Continuous <Continuous>  sodium chloride 3%  Inhalation 4 milliLiter(s) Inhalation every 6 hours    PRN Inpatient Medications  acetaminophen   Tablet .. 650 milliGRAM(s) Oral every 6 hours PRN  acetaminophen   Tablet .. 650 milliGRAM(s) Oral every 6 hours PRN  acetaminophen  Suppository .. 650 milliGRAM(s) Rectal every 6 hours PRN  ALBUTerol/ipratropium for Nebulization 3 milliLiter(s) Nebulizer every 6 hours PRN  dextrose 40% Gel 15 Gram(s) Oral once PRN  glucagon  Injectable 1 milliGRAM(s) IntraMuscular once PRN  QUEtiapine 25 milliGRAM(s) Oral every 6 hours PRN      REVIEW OF SYSTEMS: Unable to obtain 2/2 clinical condition.    VITALS/PHYSICAL EXAM  --------------------------------------------------------------------------------  T(C): 36.7 (03-25-19 @ 06:06), Max: 36.7 (03-24-19 @ 14:01)  HR: 79 (03-25-19 @ 06:06) (74 - 85)  BP: 129/57 (03-25-19 @ 06:06) (121/66 - 142/74)  RR: 18 (03-25-19 @ 06:06) (18 - 18)  SpO2: 98% (03-25-19 @ 06:06) (96% - 98%)  Wt(kg): --        03-24-19 @ 07:01  -  03-25-19 @ 07:00  --------------------------------------------------------  IN: 120 mL / OUT: 1300 mL / NET: -1180 mL      Physical Exam:  	Gen: Sleeping in bed, NAD  	HEENT: Anicteric  	Pulm: CTA B/L  	CV: RRR, S1S2;  	Abd: +BS, soft, nondistended  	: No suprapubic fullness              Extremities: no bilateral LE edema noted.               Neuro: No focal deficits  	Skin: Warm  	Vascular: No cyanosis    LABS/STUDIES  --------------------------------------------------------------------------------              11.4   5.29  >-----------<  253      [03-25-19 @ 04:20]              36.3     139  |  105  |  41  ----------------------------<  166      [03-25-19 @ 04:20]  4.5   |  20  |  1.61        Ca     8.5     [03-25-19 @ 04:20]      Mg     2.5     [03-25-19 @ 04:20]      Phos  3.8     [03-25-19 @ 04:20]    TPro  6.4  /  Alb  2.8  /  TBili  0.6  /  DBili  x   /  AST  30  /  ALT  32  /  AlkPhos  68  [03-25-19 @ 04:20]    PT/INR: PT 11.7 , INR 1.05       [03-25-19 @ 04:20]  PTT: 26.0       [03-25-19 @ 04:20]      Creatinine Trend:  SCr 1.61 [03-25 @ 04:20]  SCr 1.73 [03-24 @ 05:56]  SCr 1.76 [03-23 @ 10:59]  SCr 1.82 [03-22 @ 06:15]  SCr 2.06 [03-21 @ 07:50]    Urinalysis - [03-19-19 @ 12:00]      Color LIGHT YELLOW / Appearance Lt TURBID / SG 1.013 / pH 6.0      Gluc 150 / Ketone NEGATIVE  / Bili NEGATIVE / Urobili NORMAL       Blood MODERATE / Protein 30 / Leuk Est NEGATIVE / Nitrite NEGATIVE      RBC 26-50 / WBC 3-5 / Hyaline  / Gran  / Sq Epi OCC / Non Sq Epi  / Bacteria NEGATIVE    Urine Creatinine 18.20      [03-18-19 @ 13:05]  Urine Sodium 107      [03-18-19 @ 13:05]  Urine Urea Nitrogen 194.8      [03-18-19 @ 13:05]  Urine Potassium 13.1      [03-18-19 @ 13:05]  Urine Chloride 100      [03-18-19 @ 13:05]

## 2019-03-25 NOTE — PROGRESS NOTE ADULT - ATTENDING COMMENTS
I agree with the above note and have personally seen and examined this patient. All pertinent films have been reviewed. Please refer to clinical documentation of the history, physical examinations, data summary, and both assessment and plan as documented above and with which I agree.    to OR today for L hip hemiarthroplasty, cemented  npo  consent in chart    Abdiel Shabazz MD  Attending Orthopedic Surgeon

## 2019-03-25 NOTE — PROGRESS NOTE ADULT - ASSESSMENT
89yo Bosnian-speaking M with PMHx of DM, hard of hearing presenting for presented with AMS and respiratory distress following an unwitnessed fall, found to have L femoral neck fracture, treated for possible sepsis likely 2/2 multifocal PNA, and acute Hypoxic and hypercarbic respiratory failure, improved after diuresis for acute diastolic CHF

## 2019-03-25 NOTE — PROGRESS NOTE ADULT - SUBJECTIVE AND OBJECTIVE BOX
No acute events overnight. Pain well controlled with pain medications.    Vital Signs Last 24 Hrs  T(C): 36.7 (25 Mar 2019 06:06), Max: 36.7 (24 Mar 2019 14:01)  T(F): 98 (25 Mar 2019 06:06), Max: 98 (24 Mar 2019 14:01)  HR: 79 (25 Mar 2019 06:06) (74 - 85)  BP: 129/57 (25 Mar 2019 06:06) (121/66 - 142/74)  BP(mean): --  RR: 18 (25 Mar 2019 06:06) (18 - 18)  SpO2: 98% (25 Mar 2019 06:06) (96% - 98%)    Exam:  Gen: NAD  Motor: EHL/FHL/TA/Gastrocnemius grossly intact  Sensory: grossly intact  Skin intact    A/P: 88 year old male with left femoral neck fracture for OR today  - Multiple discussions held with son, Shaylee Flor on 3/21/19, 3/22/19 and 3/23/19. Discussed the risks/benefits/alternatives for hip fracture and surgery. Options include nonoperative management, girdlestone and hemiarthroplasty. Risks/benefits of each option discussed including risks of mortality, blood loss, anesthesia, dislocation and potential to ambulate with each option. Following extensive discussions, son and family elected for hemiarthroplasty. Son understands risks of mortality, infection, dislocation, anesthesia associated with hemiarthroplasty.  - Pain Control  - NWB LLE  - Medicine/Cards/Pulm Clearance: Patient high risk for intermediate risk surgery. Patient as optimized as he can be at this time  - NPO  - OR today for hemiarthroplasty

## 2019-03-25 NOTE — PROGRESS NOTE ADULT - PROBLEM SELECTOR PLAN 8
DVT PPX:HSQ    - Advanced Directive: Pt is DNR/DNI, MOLST in chart    The patient is considered to have High risk for a intermediate risk surgery  pt is optimized as much possible for this time sensitive surgery and may proceed to OR

## 2019-03-25 NOTE — PROGRESS NOTE ADULT - PROBLEM SELECTOR PLAN 2
-Echo showed moderate diastolic dysfunction and severe pulm HTN   -Coreg as per Cardiology rec.  -Now euvolemic c/w Lasix 40mg PO daily

## 2019-03-25 NOTE — CHART NOTE - NSCHARTNOTEFT_GEN_A_CORE
Left Hip Hemiarthroplasty performed. . No intraoperative complications. Central line placed by anesthesia prior to incision.    88 year old male s/p L hip hemiarthroplasty  - Pain control: No narcotics  - WBAT  - PT/OT, OOB  - Posterior Hip Precautions  - Ancef x24 hours  - Prefer Coumadin for chemical prophylaxis Left Hip Hemiarthroplasty performed. . No intraoperative complications. Central line placed by anesthesia prior to incision.    88 year old male s/p L hip hemiarthroplasty  - Pain control: No narcotics  - WBAT  - PT/OT, OOB  - Posterior Hip Precautions  - Ancef x24 hours  - Prefer Coumadin for chemical prophylaxis  - D/C Garcia in AM

## 2019-03-25 NOTE — PROGRESS NOTE ADULT - SUBJECTIVE AND OBJECTIVE BOX
Subjective: Patient seen and examined. No new events except as noted.     SUBJECTIVE/ROS:  NAD      MEDICATIONS:  MEDICATIONS  (STANDING):  carvedilol 3.125 milliGRAM(s) Oral every 12 hours  dextrose 5%. 1000 milliLiter(s) (50 mL/Hr) IV Continuous <Continuous>  dextrose 50% Injectable 12.5 Gram(s) IV Push once  dextrose 50% Injectable 25 Gram(s) IV Push once  dextrose 50% Injectable 25 Gram(s) IV Push once  furosemide    Tablet 40 milliGRAM(s) Oral daily  insulin lispro (HumaLOG) corrective regimen sliding scale   SubCutaneous three times a day before meals  insulin lispro (HumaLOG) corrective regimen sliding scale   SubCutaneous at bedtime  sodium chloride 0.9%. 1000 milliLiter(s) (50 mL/Hr) IV Continuous <Continuous>  sodium chloride 3%  Inhalation 4 milliLiter(s) Inhalation every 6 hours      PHYSICAL EXAM:  T(C): 36.7 (03-25-19 @ 06:06), Max: 36.7 (03-24-19 @ 14:01)  HR: 79 (03-25-19 @ 06:06) (74 - 85)  BP: 129/57 (03-25-19 @ 06:06) (121/66 - 142/74)  RR: 18 (03-25-19 @ 06:06) (18 - 18)  SpO2: 98% (03-25-19 @ 06:06) (96% - 98%)  Wt(kg): --  I&O's Summary    24 Mar 2019 07:01  -  25 Mar 2019 07:00  --------------------------------------------------------  IN: 120 mL / OUT: 1300 mL / NET: -1180 mL            JVP: Normal  Neck: supple  Lung: clear   CV: S1 S2 , Murmur:  Abd: soft  Ext: No edema  neuro: Awake / alert  Psych: flat affect  Skin: normal``    LABS/DATA:    CARDIAC MARKERS:                                11.4   5.29  )-----------( 253      ( 25 Mar 2019 04:20 )             36.3     03-25    139  |  105  |  41<H>  ----------------------------<  166<H>  4.5   |  20<L>  |  1.61<H>    Ca    8.5      25 Mar 2019 04:20  Phos  3.8     03-25  Mg     2.5     03-25    TPro  6.4  /  Alb  2.8<L>  /  TBili  0.6  /  DBili  x   /  AST  30  /  ALT  32  /  AlkPhos  68  03-25    proBNP:   Lipid Profile:   HgA1c:   TSH:     TELE:  EKG:

## 2019-03-25 NOTE — PROGRESS NOTE ADULT - ASSESSMENT
88 year old male with HLD, DVT s/p AC, dementia and hearing loss who presents to the hospital after a fall and hip fracture. Nephrology consulted for HAVEN.    HAVEN  - Patient with acute kidney injury likely multifactorial in nature.  -contrast causing the creatinine to rise leading to HAVEN from direct tubular damage ... improving now 1.6  - urinary retention failed voiding Garcia back  -avoid nephrotoxic medications (dose vancomycin daily with trough levels),   -maintain adequate perfusion pressures, and renally dose medications. Nephrology will continue to follow.

## 2019-03-26 DIAGNOSIS — R33.9 RETENTION OF URINE, UNSPECIFIED: ICD-10-CM

## 2019-03-26 LAB
ANION GAP SERPL CALC-SCNC: 14 MMO/L — SIGNIFICANT CHANGE UP (ref 7–14)
BUN SERPL-MCNC: 47 MG/DL — HIGH (ref 7–23)
CALCIUM SERPL-MCNC: 8.4 MG/DL — SIGNIFICANT CHANGE UP (ref 8.4–10.5)
CHLORIDE SERPL-SCNC: 106 MMOL/L — SIGNIFICANT CHANGE UP (ref 98–107)
CO2 SERPL-SCNC: 20 MMOL/L — LOW (ref 22–31)
CREAT SERPL-MCNC: 1.77 MG/DL — HIGH (ref 0.5–1.3)
GLUCOSE BLDC GLUCOMTR-MCNC: 176 MG/DL — HIGH (ref 70–99)
GLUCOSE BLDC GLUCOMTR-MCNC: 199 MG/DL — HIGH (ref 70–99)
GLUCOSE BLDC GLUCOMTR-MCNC: 212 MG/DL — HIGH (ref 70–99)
GLUCOSE BLDC GLUCOMTR-MCNC: 239 MG/DL — HIGH (ref 70–99)
GLUCOSE SERPL-MCNC: 217 MG/DL — HIGH (ref 70–99)
HCT VFR BLD CALC: 35.9 % — LOW (ref 39–50)
HGB BLD-MCNC: 11.2 G/DL — LOW (ref 13–17)
MCHC RBC-ENTMCNC: 30 PG — SIGNIFICANT CHANGE UP (ref 27–34)
MCHC RBC-ENTMCNC: 31.2 % — LOW (ref 32–36)
MCV RBC AUTO: 96.2 FL — SIGNIFICANT CHANGE UP (ref 80–100)
NRBC # FLD: 0 K/UL — SIGNIFICANT CHANGE UP (ref 0–0)
PLATELET # BLD AUTO: 293 K/UL — SIGNIFICANT CHANGE UP (ref 150–400)
PMV BLD: 9.5 FL — SIGNIFICANT CHANGE UP (ref 7–13)
POTASSIUM SERPL-MCNC: 4.9 MMOL/L — SIGNIFICANT CHANGE UP (ref 3.5–5.3)
POTASSIUM SERPL-SCNC: 4.9 MMOL/L — SIGNIFICANT CHANGE UP (ref 3.5–5.3)
RBC # BLD: 3.73 M/UL — LOW (ref 4.2–5.8)
RBC # FLD: 12.6 % — SIGNIFICANT CHANGE UP (ref 10.3–14.5)
SODIUM SERPL-SCNC: 140 MMOL/L — SIGNIFICANT CHANGE UP (ref 135–145)
WBC # BLD: 7.87 K/UL — SIGNIFICANT CHANGE UP (ref 3.8–10.5)
WBC # FLD AUTO: 7.87 K/UL — SIGNIFICANT CHANGE UP (ref 3.8–10.5)

## 2019-03-26 PROCEDURE — 99233 SBSQ HOSP IP/OBS HIGH 50: CPT

## 2019-03-26 RX ORDER — HEPARIN SODIUM 5000 [USP'U]/ML
5000 INJECTION INTRAVENOUS; SUBCUTANEOUS EVERY 8 HOURS
Qty: 0 | Refills: 0 | Status: DISCONTINUED | OUTPATIENT
Start: 2019-03-26 | End: 2019-03-29

## 2019-03-26 RX ORDER — PHENAZOPYRIDINE HCL 100 MG
100 TABLET ORAL EVERY 8 HOURS
Qty: 0 | Refills: 0 | Status: DISCONTINUED | OUTPATIENT
Start: 2019-03-26 | End: 2019-03-26

## 2019-03-26 RX ORDER — LIDOCAINE 4 G/100G
1 CREAM TOPICAL EVERY 6 HOURS
Qty: 0 | Refills: 0 | Status: DISCONTINUED | OUTPATIENT
Start: 2019-03-26 | End: 2019-03-29

## 2019-03-26 RX ORDER — PHENAZOPYRIDINE HCL 100 MG
100 TABLET ORAL EVERY 8 HOURS
Qty: 0 | Refills: 0 | Status: COMPLETED | OUTPATIENT
Start: 2019-03-26 | End: 2019-03-28

## 2019-03-26 RX ADMIN — CARVEDILOL PHOSPHATE 3.12 MILLIGRAM(S): 80 CAPSULE, EXTENDED RELEASE ORAL at 05:35

## 2019-03-26 RX ADMIN — SODIUM CHLORIDE 4 MILLILITER(S): 9 INJECTION INTRAMUSCULAR; INTRAVENOUS; SUBCUTANEOUS at 16:39

## 2019-03-26 RX ADMIN — Medication 650 MILLIGRAM(S): at 14:07

## 2019-03-26 RX ADMIN — Medication 2: at 08:08

## 2019-03-26 RX ADMIN — Medication 100 MILLIGRAM(S): at 05:34

## 2019-03-26 RX ADMIN — Medication 650 MILLIGRAM(S): at 01:34

## 2019-03-26 RX ADMIN — Medication 650 MILLIGRAM(S): at 09:00

## 2019-03-26 RX ADMIN — Medication 4: at 11:50

## 2019-03-26 RX ADMIN — SODIUM CHLORIDE 4 MILLILITER(S): 9 INJECTION INTRAMUSCULAR; INTRAVENOUS; SUBCUTANEOUS at 21:55

## 2019-03-26 RX ADMIN — Medication 100 MILLIGRAM(S): at 22:47

## 2019-03-26 RX ADMIN — SODIUM CHLORIDE 4 MILLILITER(S): 9 INJECTION INTRAMUSCULAR; INTRAVENOUS; SUBCUTANEOUS at 09:53

## 2019-03-26 RX ADMIN — Medication 650 MILLIGRAM(S): at 02:04

## 2019-03-26 RX ADMIN — LIDOCAINE 1 APPLICATION(S): 4 CREAM TOPICAL at 18:00

## 2019-03-26 RX ADMIN — HEPARIN SODIUM 5000 UNIT(S): 5000 INJECTION INTRAVENOUS; SUBCUTANEOUS at 13:51

## 2019-03-26 RX ADMIN — Medication 650 MILLIGRAM(S): at 15:05

## 2019-03-26 RX ADMIN — Medication 100 MILLIGRAM(S): at 12:05

## 2019-03-26 RX ADMIN — Medication 2: at 16:59

## 2019-03-26 RX ADMIN — Medication 650 MILLIGRAM(S): at 08:07

## 2019-03-26 RX ADMIN — HEPARIN SODIUM 5000 UNIT(S): 5000 INJECTION INTRAVENOUS; SUBCUTANEOUS at 22:47

## 2019-03-26 RX ADMIN — Medication 100 MILLIGRAM(S): at 13:51

## 2019-03-26 RX ADMIN — Medication 40 MILLIGRAM(S): at 05:35

## 2019-03-26 NOTE — PHYSICAL THERAPY INITIAL EVALUATION ADULT - IMPAIRMENTS FOUND, PT EVAL
aerobic capacity/endurance/ROM/gait, locomotion, and balance/cognitive impairment/muscle strength
muscle strength/gait, locomotion, and balance

## 2019-03-26 NOTE — PROGRESS NOTE ADULT - SUBJECTIVE AND OBJECTIVE BOX
Patient slept well overnight. He self d/c'd his central line overnight. Pressure applied by nursing and dressing applied. Pain well controlled with pain medications.    Vital Signs Last 24 Hrs  T(C): 36.6 (26 Mar 2019 05:30), Max: 37 (25 Mar 2019 16:20)  T(F): 97.8 (26 Mar 2019 05:30), Max: 98.6 (25 Mar 2019 16:20)  HR: 83 (26 Mar 2019 05:30) (74 - 83)  BP: 130/68 (26 Mar 2019 05:30) (104/93 - 143/78)  BP(mean): 75 (25 Mar 2019 22:15) (74 - 96)  RR: 17 (26 Mar 2019 05:30) (12 - 19)  SpO2: 100% (26 Mar 2019 05:30) (97% - 100%)    Exam:  Gen: NAD  Motor: wiggles toes  Sensory: unable to assess  Dressing: Clean, Dry, Intact    A/P: 89 year old male s/p L Hip hemiarthroplast  - Pain Control: No narcotics  - Posterior hip precautions   - Prefer coumadin for chemical prophylaxis  - WBAT  - D/C Garcia  - PT/OT, OOB  - Discharge Planning

## 2019-03-26 NOTE — PHYSICAL THERAPY INITIAL EVALUATION ADULT - CRITERIA FOR SKILLED THERAPEUTIC INTERVENTIONS
risk reduction/prevention/rehab potential/impairments found/functional limitations in following categories
functional limitations in following categories/rehab potential/therapy frequency/impairments found/risk reduction/prevention

## 2019-03-26 NOTE — CHART NOTE - NSCHARTNOTEFT_GEN_A_CORE
Spoke with Ortho team who recommended starting Coumadin instead of therapeutic SQ heparin for DVT ppx as pt high risk.  Discussed with Attending Dr. Leahy, to continue pt on heparin SQ for now due to HAVEN (no lovenox or coumadin at this time), will continue to re-assess transition pending AM renal fx

## 2019-03-26 NOTE — PHYSICAL THERAPY INITIAL EVALUATION ADULT - FOLLOWS COMMANDS/ANSWERS QUESTIONS, REHAB EVAL
able to follow single-step instructions/50% of the time/75% of the time
25% of the time/able to follow single-step instructions

## 2019-03-26 NOTE — PROGRESS NOTE ADULT - ASSESSMENT
acute diastolic chf  pulm HTN  stable  cont current meds  cont diuretics     HAVEN  stable     DM  Monitor finger stick. Insulin coverage.

## 2019-03-26 NOTE — PROGRESS NOTE ADULT - SUBJECTIVE AND OBJECTIVE BOX
Patient is a 89y old  Male who presents with a chief complaint of fall (26 Mar 2019 15:48)      SUBJECTIVE / OVERNIGHT EVENTS:  Pt seen earlier, grandson at bedside, concepcion irritating pt.  No pain elsewhere.    MEDICATIONS  (STANDING):  carvedilol 3.125 milliGRAM(s) Oral every 12 hours  dextrose 5%. 1000 milliLiter(s) (50 mL/Hr) IV Continuous <Continuous>  dextrose 50% Injectable 12.5 Gram(s) IV Push once  dextrose 50% Injectable 25 Gram(s) IV Push once  dextrose 50% Injectable 25 Gram(s) IV Push once  furosemide    Tablet 40 milliGRAM(s) Oral daily  heparin  Injectable 5000 Unit(s) SubCutaneous every 8 hours  insulin lispro (HumaLOG) corrective regimen sliding scale   SubCutaneous three times a day before meals  insulin lispro (HumaLOG) corrective regimen sliding scale   SubCutaneous at bedtime  phenazopyridine 100 milliGRAM(s) Oral every 8 hours  sodium chloride 3%  Inhalation 4 milliLiter(s) Inhalation every 6 hours    MEDICATIONS  (PRN):  acetaminophen   Tablet .. 650 milliGRAM(s) Oral every 6 hours PRN Mild Pain (1 - 3), Moderate Pain (4 - 6), Severe Pain (7 - 10)  acetaminophen   Tablet .. 650 milliGRAM(s) Oral every 6 hours PRN Temp greater or equal to 38C (100.4F)  acetaminophen  Suppository .. 650 milliGRAM(s) Rectal every 6 hours PRN Temp greater or equal to 38C (100.4F)  ALBUTerol/ipratropium for Nebulization 3 milliLiter(s) Nebulizer every 6 hours PRN Shortness of Breath and/or Wheezing  dextrose 40% Gel 15 Gram(s) Oral once PRN Blood Glucose LESS THAN 70 milliGRAM(s)/deciliter  glucagon  Injectable 1 milliGRAM(s) IntraMuscular once PRN Glucose LESS THAN 70 milligrams/deciliter  lidocaine 2% Gel 1 Application(s) Topical every 6 hours PRN discomfort/pain  QUEtiapine 25 milliGRAM(s) Oral every 6 hours PRN agitation      CAPILLARY BLOOD GLUCOSE      POCT Blood Glucose.: 176 mg/dL (26 Mar 2019 16:43)  POCT Blood Glucose.: 239 mg/dL (26 Mar 2019 11:49)  POCT Blood Glucose.: 199 mg/dL (26 Mar 2019 07:51)  POCT Blood Glucose.: 202 mg/dL (25 Mar 2019 21:47)      I&O's Summary    25 Mar 2019 07:01  -  26 Mar 2019 07:00  --------------------------------------------------------  IN: 75 mL / OUT: 1080 mL / NET: -1005 mL        Vital Signs Last 24 Hrs  T(C): 36.3 (26 Mar 2019 16:13), Max: 36.6 (26 Mar 2019 05:30)  T(F): 97.3 (26 Mar 2019 16:13), Max: 97.8 (26 Mar 2019 05:30)  HR: 82 (26 Mar 2019 16:39) (71 - 84)  BP: 108/62 (26 Mar 2019 16:13) (104/93 - 135/72)  BP(mean): 75 (25 Mar 2019 22:15) (74 - 96)  RR: 18 (26 Mar 2019 16:13) (12 - 19)  SpO2: 99% (26 Mar 2019 16:39) (99% - 100%)    PHYSICAL EXAM:  GENERAL: elderly M, lying flat in bed, NAD, [place]  HEAD:  Atraumatic, Normocephalic  EYES: EOMI, PERRLA, conjunctiva and sclera clear  NECK: Supple, No JVD  CHEST/LUNG: Clear to auscultation bilaterally; No wheeze  HEART: Regular rate and rhythm; No murmurs, rubs, or gallops  ABDOMEN: Soft, Nontender, Nondistended; Bowel sounds present  EXTREMITIES:  2+ Peripheral Pulses, No clubbing, cyanosis, or edema  PSYCH: AAOx1  NEUROLOGY: non-focal  SKIN: No rashes or lesions  +concepcion  L lateral hip dressing intact    LABS:                        11.2   7.87  )-----------( 293      ( 26 Mar 2019 06:40 )             35.9     03-26    140  |  106  |  47<H>  ----------------------------<  217<H>  4.9   |  20<L>  |  1.77<H>    Ca    8.4      26 Mar 2019 06:40  Phos  3.8     03-25  Mg     2.5     03-25    TPro  6.4  /  Alb  2.8<L>  /  TBili  0.6  /  DBili  x   /  AST  30  /  ALT  32  /  AlkPhos  68  03-25    PT/INR - ( 25 Mar 2019 04:20 )   PT: 11.7 SEC;   INR: 1.05          PTT - ( 25 Mar 2019 04:20 )  PTT:26.0 SEC          RADIOLOGY & ADDITIONAL TESTS:    Imaging Personally Reviewed:    Consultant(s) Notes Reviewed:  ortho    Care Discussed with Consultants/Other Providers: RN, SW, CM, ADS re overall care; PT and OT re post surgical therapy

## 2019-03-26 NOTE — PHYSICAL THERAPY INITIAL EVALUATION ADULT - ACTIVE RANGE OF MOTION EXAMINATION, REHAB EVAL
bilateral upper extremity Active ROM was WFL (within functional limits)/bilateral  lower extremity Active ROM was WFL (within functional limits)/except lacking ~8 degrees of extension
bilateral shoulder flexion ~85 degrees, bilateral elbow/hands/wrist WFL, left LE not tested as pt deferred/Right LE Active ROM was WFL (within functional limits)

## 2019-03-26 NOTE — PHYSICAL THERAPY INITIAL EVALUATION ADULT - PATIENT PROFILE REVIEW, REHAB EVAL
No Formal Activity Order in the Computer; spoke with SHANNA Kim prior to PT evaluation-->Pt OK for PT consult/OOB activity/yes

## 2019-03-26 NOTE — PHYSICAL THERAPY INITIAL EVALUATION ADULT - ADDITIONAL COMMENTS
Pt is a poor historian. Information regarding pt's prior level of function was obtained by pt's grandson @bedside. Pt lives in a private house with son with ~3-4STE; (+)bilateral handrails; and a flight of stairs to negotiate to basement where his room is. Prior to hospital admission pt was completely independent and used a single axis cane PRN. Pt has had no other recent falls aside from the one that brought him in.    Pt left comfortable in bed, NAD, all lines intact, all precautions maintained, with call bell in reach, bed alarm on, grandson @bedside, and RN aware of PT evaluation.
Pt is a poor historian; information regarding pt's prior level of function was obtained from son @bedside; pt lives in a private house with son with 1STE; bedroom/bathroom on first floor. Prior to hospital admission pt was completely independent and used no assistive device with ambulation. Pt has had no other recent falls aside from the one that brought him in.    Pt left comfortable in bed, NAD, all lines intact, all precautions maintained, with call bell in reach, bed alarm on, son @bedside, and RN aware of PT evaluation.

## 2019-03-26 NOTE — PHYSICAL THERAPY INITIAL EVALUATION ADULT - DISCHARGE DISPOSITION, PT EVAL
Anticipating Rehabilitation; please follow PT notes carefully for further functional assessment
to improve strength and balance for return to prior level of function./rehabilitation facility

## 2019-03-26 NOTE — PHYSICAL THERAPY INITIAL EVALUATION ADULT - GENERAL OBSERVATIONS, REHAB EVAL
Pt encountered in semisupine position, no distress, AxOx1, with +IV, +cardiac monitor, and son @bedside.
Pt encountered in semisupine position, no distress, AxOx3, with +IV, Left Hip dressing dry/intact, +concepcion, abduction pillow, and grandson @bedside.

## 2019-03-26 NOTE — PHYSICAL THERAPY INITIAL EVALUATION ADULT - DIAGNOSIS, PT EVAL
Pt s/p fall; pt now s/p left hip hemiarthroplasty; pt presents with decreased strength, decreased balance, and difficulty with ambulation. Pt s/p fall; pt now s/p left hip hemiarthroplasty 03/25/2019; pt presents with decreased strength, decreased balance, and difficulty with ambulation.

## 2019-03-26 NOTE — CHART NOTE - NSCHARTNOTEFT_GEN_A_CORE
Notified by nurse pt removed right IG line without any bleeding. Pt seen and examined with grandson at bedside. Grandson reports he was sleeping when it happened. Pt demented at baseline   -Rt IG line next to pt in bed without any signs of bleeding   -Sutures removed  -Area cleaned and gauze applied.

## 2019-03-26 NOTE — PHYSICAL THERAPY INITIAL EVALUATION ADULT - PLANNED THERAPY INTERVENTIONS, PT EVAL
ROM/balance training/bed mobility training/gait training/strengthening/transfer training
balance training/gait training/strengthening/transfer training/postural re-education/bed mobility training

## 2019-03-26 NOTE — PROGRESS NOTE ADULT - ATTENDING COMMENTS
I agree with the above note and have personally seen and examined this patient. All pertinent films have been reviewed. Please refer to clinical documentation of the history, physical examinations, data summary, and both assessment and plan as documented above and with which I agree.    wbat, posterior hip precautions  pt  minimize narcs  d/c concepcion  aquacel x 2 weeks    Abdiel Shabazz MD  Attending Orthopedic Surgeon

## 2019-03-26 NOTE — OCCUPATIONAL THERAPY INITIAL EVALUATION ADULT - PERTINENT HX OF CURRENT PROBLEM, REHAB EVAL
88 y.o. male with PMHx of DM, hard of hearing, DVT in the past, skin cancer (s/p excision) presented to [facility] with AMS and respiratory distress following an unwitnessed fall. CT C/A/P with possible multifocal PNA, L femoral neck fx. Pt now s/p left Hip Hemiarthroplasty

## 2019-03-26 NOTE — OCCUPATIONAL THERAPY INITIAL EVALUATION ADULT - GENERAL OBSERVATIONS, REHAB EVAL
Pt received in semisupine position. Pt received in semisupine position. Pt [place]. Pt [place] speaking. Grandson at bedside able to provide translation

## 2019-03-26 NOTE — PHYSICAL THERAPY INITIAL EVALUATION ADULT - PASSIVE RANGE OF MOTION EXAMINATION, REHAB EVAL
bilateral lower extremity Passive ROM was WFL (within functional limits)/except lacking ~8 degrees of extension/bilateral upper extremity Passive ROM was WFL (within functional limits)
Right LE Passive ROM was WFL (within functional limits)/bilateral shoulder flexion ~90 degrees, bilateral elbow/hands/wrist WFL, left LE not tested as pt deferred

## 2019-03-26 NOTE — PHYSICAL THERAPY INITIAL EVALUATION ADULT - PERTINENT HX OF CURRENT PROBLEM, REHAB EVAL
Pt is a 89yo [place]nian-speaking M with PMHx of DM, hard of hearing presenting for presented with AMS and respiratory distress following an unwitnessed fall, found to have Left femoral neck fracture, sepsis likely 2/2 multifocal PNA, and acute Hypoxic and hypercarbic respiratory failure.

## 2019-03-26 NOTE — PHYSICAL THERAPY INITIAL EVALUATION ADULT - LEVEL OF INDEPENDENCE: SIT/STAND, REHAB EVAL
*bilateral knee buckling noted/maximum assist (25% patients effort)
To be assessed; pt deferred after + encouragement

## 2019-03-26 NOTE — OCCUPATIONAL THERAPY INITIAL EVALUATION ADULT - PLANNED THERAPY INTERVENTIONS, OT EVAL
neuromuscular re-education/ROM/transfer training/balance training/ADL retraining/bed mobility training/strengthening

## 2019-03-26 NOTE — PHYSICAL THERAPY INITIAL EVALUATION ADULT - MANUAL MUSCLE TESTING RESULTS, REHAB EVAL
grossly assessed due to/right UE 3/5, Left UE 4/5, right LE 3-/5, Left LE 2/5
grossly assessed due to/cardiac precautions/cognitive impairment; bilateral shoulders at least 3+/5, bilateral elbow/hand/wrist at least 3/5, Right LE 3-/5, Left LE not tested as pt deferred

## 2019-03-26 NOTE — PROGRESS NOTE ADULT - SUBJECTIVE AND OBJECTIVE BOX
Subjective: Patient seen and examined. No new events except as noted.     SUBJECTIVE/ROS:  feels ok   family at bedside       MEDICATIONS:  MEDICATIONS  (STANDING):  carvedilol 3.125 milliGRAM(s) Oral every 12 hours  dextrose 5%. 1000 milliLiter(s) (50 mL/Hr) IV Continuous <Continuous>  dextrose 50% Injectable 12.5 Gram(s) IV Push once  dextrose 50% Injectable 25 Gram(s) IV Push once  dextrose 50% Injectable 25 Gram(s) IV Push once  furosemide    Tablet 40 milliGRAM(s) Oral daily  heparin  Injectable 5000 Unit(s) SubCutaneous every 8 hours  insulin lispro (HumaLOG) corrective regimen sliding scale   SubCutaneous three times a day before meals  insulin lispro (HumaLOG) corrective regimen sliding scale   SubCutaneous at bedtime  phenazopyridine 100 milliGRAM(s) Oral every 8 hours  sodium chloride 3%  Inhalation 4 milliLiter(s) Inhalation every 6 hours      PHYSICAL EXAM:  T(C): 36.6 (03-26-19 @ 05:30), Max: 37 (03-25-19 @ 16:20)  HR: 71 (03-26-19 @ 09:59) (71 - 83)  BP: 130/68 (03-26-19 @ 05:30) (104/93 - 143/78)  RR: 17 (03-26-19 @ 05:30) (12 - 19)  SpO2: 99% (03-26-19 @ 09:59) (97% - 100%)  Wt(kg): --  I&O's Summary    25 Mar 2019 07:01  -  26 Mar 2019 07:00  --------------------------------------------------------  IN: 75 mL / OUT: 1080 mL / NET: -1005 mL      Height (cm): 172.72 (03-25 @ 16:40)  Weight (kg): 72.6 (03-25 @ 16:40)  BMI (kg/m2): 24.3 (03-25 @ 16:40)  BSA (m2): 1.86 (03-25 @ 16:40)      JVP: Normal  Neck: supple  Lung: clear   CV: S1 S2 , Murmur:  Abd: soft  Ext: No edema  neuro: Awake / alert  Psych: flat affect  Skin: normal``    LABS/DATA:    CARDIAC MARKERS:                                11.2   7.87  )-----------( 293      ( 26 Mar 2019 06:40 )             35.9     03-26    140  |  106  |  47<H>  ----------------------------<  217<H>  4.9   |  20<L>  |  1.77<H>    Ca    8.4      26 Mar 2019 06:40  Phos  3.8     03-25  Mg     2.5     03-25    TPro  6.4  /  Alb  2.8<L>  /  TBili  0.6  /  DBili  x   /  AST  30  /  ALT  32  /  AlkPhos  68  03-25    proBNP:   Lipid Profile:   HgA1c:   TSH:     TELE:  EKG:

## 2019-03-26 NOTE — PHYSICAL THERAPY INITIAL EVALUATION ADULT - PRECAUTIONS/LIMITATIONS, REHAB EVAL
cardiac precautions/hearing precautions/[place]/fall precautions
[place]/hearing precautions/surgical precautions/fall precautions/left hip precautions

## 2019-03-27 DIAGNOSIS — E11.65 TYPE 2 DIABETES MELLITUS WITH HYPERGLYCEMIA: ICD-10-CM

## 2019-03-27 DIAGNOSIS — I10 ESSENTIAL (PRIMARY) HYPERTENSION: ICD-10-CM

## 2019-03-27 LAB
ANION GAP SERPL CALC-SCNC: 15 MMO/L — HIGH (ref 7–14)
BUN SERPL-MCNC: 48 MG/DL — HIGH (ref 7–23)
CALCIUM SERPL-MCNC: 8.2 MG/DL — LOW (ref 8.4–10.5)
CHLORIDE SERPL-SCNC: 105 MMOL/L — SIGNIFICANT CHANGE UP (ref 98–107)
CO2 SERPL-SCNC: 19 MMOL/L — LOW (ref 22–31)
CREAT SERPL-MCNC: 1.77 MG/DL — HIGH (ref 0.5–1.3)
GLUCOSE BLDC GLUCOMTR-MCNC: 170 MG/DL — HIGH (ref 70–99)
GLUCOSE BLDC GLUCOMTR-MCNC: 184 MG/DL — HIGH (ref 70–99)
GLUCOSE BLDC GLUCOMTR-MCNC: 199 MG/DL — HIGH (ref 70–99)
GLUCOSE BLDC GLUCOMTR-MCNC: 223 MG/DL — HIGH (ref 70–99)
GLUCOSE BLDC GLUCOMTR-MCNC: 274 MG/DL — HIGH (ref 70–99)
GLUCOSE SERPL-MCNC: 201 MG/DL — HIGH (ref 70–99)
HBA1C BLD-MCNC: 9.7 % — HIGH (ref 4–5.6)
HCT VFR BLD CALC: 31.2 % — LOW (ref 39–50)
HGB BLD-MCNC: 10.1 G/DL — LOW (ref 13–17)
MAGNESIUM SERPL-MCNC: 2.1 MG/DL — SIGNIFICANT CHANGE UP (ref 1.6–2.6)
MCHC RBC-ENTMCNC: 29.9 PG — SIGNIFICANT CHANGE UP (ref 27–34)
MCHC RBC-ENTMCNC: 32.4 % — SIGNIFICANT CHANGE UP (ref 32–36)
MCV RBC AUTO: 92.3 FL — SIGNIFICANT CHANGE UP (ref 80–100)
NRBC # FLD: 0 K/UL — SIGNIFICANT CHANGE UP (ref 0–0)
PHOSPHATE SERPL-MCNC: 3.7 MG/DL — SIGNIFICANT CHANGE UP (ref 2.5–4.5)
PLATELET # BLD AUTO: 311 K/UL — SIGNIFICANT CHANGE UP (ref 150–400)
PMV BLD: 9.4 FL — SIGNIFICANT CHANGE UP (ref 7–13)
POTASSIUM SERPL-MCNC: 4.6 MMOL/L — SIGNIFICANT CHANGE UP (ref 3.5–5.3)
POTASSIUM SERPL-SCNC: 4.6 MMOL/L — SIGNIFICANT CHANGE UP (ref 3.5–5.3)
RBC # BLD: 3.38 M/UL — LOW (ref 4.2–5.8)
RBC # FLD: 13.1 % — SIGNIFICANT CHANGE UP (ref 10.3–14.5)
SODIUM SERPL-SCNC: 139 MMOL/L — SIGNIFICANT CHANGE UP (ref 135–145)
WBC # BLD: 6.99 K/UL — SIGNIFICANT CHANGE UP (ref 3.8–10.5)
WBC # FLD AUTO: 6.99 K/UL — SIGNIFICANT CHANGE UP (ref 3.8–10.5)

## 2019-03-27 PROCEDURE — 99255 IP/OBS CONSLTJ NEW/EST HI 80: CPT | Mod: GC

## 2019-03-27 PROCEDURE — 99233 SBSQ HOSP IP/OBS HIGH 50: CPT

## 2019-03-27 RX ORDER — INSULIN LISPRO 100/ML
VIAL (ML) SUBCUTANEOUS
Qty: 0 | Refills: 0 | Status: DISCONTINUED | OUTPATIENT
Start: 2019-03-27 | End: 2019-03-29

## 2019-03-27 RX ORDER — TAMSULOSIN HYDROCHLORIDE 0.4 MG/1
0.4 CAPSULE ORAL AT BEDTIME
Qty: 0 | Refills: 0 | Status: DISCONTINUED | OUTPATIENT
Start: 2019-03-27 | End: 2019-03-29

## 2019-03-27 RX ORDER — INSULIN GLARGINE 100 [IU]/ML
10 INJECTION, SOLUTION SUBCUTANEOUS AT BEDTIME
Qty: 0 | Refills: 0 | Status: DISCONTINUED | OUTPATIENT
Start: 2019-03-27 | End: 2019-03-28

## 2019-03-27 RX ORDER — ASPIRIN/CALCIUM CARB/MAGNESIUM 324 MG
325 TABLET ORAL
Qty: 0 | Refills: 0 | Status: DISCONTINUED | OUTPATIENT
Start: 2019-03-27 | End: 2019-03-28

## 2019-03-27 RX ADMIN — HEPARIN SODIUM 5000 UNIT(S): 5000 INJECTION INTRAVENOUS; SUBCUTANEOUS at 05:41

## 2019-03-27 RX ADMIN — CARVEDILOL PHOSPHATE 3.12 MILLIGRAM(S): 80 CAPSULE, EXTENDED RELEASE ORAL at 17:38

## 2019-03-27 RX ADMIN — Medication 40 MILLIGRAM(S): at 05:41

## 2019-03-27 RX ADMIN — Medication 2: at 07:49

## 2019-03-27 RX ADMIN — SODIUM CHLORIDE 4 MILLILITER(S): 9 INJECTION INTRAMUSCULAR; INTRAVENOUS; SUBCUTANEOUS at 09:42

## 2019-03-27 RX ADMIN — SODIUM CHLORIDE 4 MILLILITER(S): 9 INJECTION INTRAMUSCULAR; INTRAVENOUS; SUBCUTANEOUS at 22:00

## 2019-03-27 RX ADMIN — HEPARIN SODIUM 5000 UNIT(S): 5000 INJECTION INTRAVENOUS; SUBCUTANEOUS at 21:38

## 2019-03-27 RX ADMIN — Medication 100 MILLIGRAM(S): at 05:41

## 2019-03-27 RX ADMIN — HEPARIN SODIUM 5000 UNIT(S): 5000 INJECTION INTRAVENOUS; SUBCUTANEOUS at 16:16

## 2019-03-27 RX ADMIN — Medication 100 MILLIGRAM(S): at 21:38

## 2019-03-27 RX ADMIN — TAMSULOSIN HYDROCHLORIDE 0.4 MILLIGRAM(S): 0.4 CAPSULE ORAL at 21:37

## 2019-03-27 RX ADMIN — CARVEDILOL PHOSPHATE 3.12 MILLIGRAM(S): 80 CAPSULE, EXTENDED RELEASE ORAL at 05:41

## 2019-03-27 RX ADMIN — SODIUM CHLORIDE 4 MILLILITER(S): 9 INJECTION INTRAMUSCULAR; INTRAVENOUS; SUBCUTANEOUS at 16:19

## 2019-03-27 RX ADMIN — INSULIN GLARGINE 10 UNIT(S): 100 INJECTION, SOLUTION SUBCUTANEOUS at 22:16

## 2019-03-27 RX ADMIN — Medication 6: at 16:45

## 2019-03-27 RX ADMIN — Medication 2: at 12:38

## 2019-03-27 RX ADMIN — Medication 325 MILLIGRAM(S): at 17:38

## 2019-03-27 RX ADMIN — Medication 100 MILLIGRAM(S): at 16:34

## 2019-03-27 NOTE — PROGRESS NOTE ADULT - SUBJECTIVE AND OBJECTIVE BOX
Subjective: Patient seen and examined. No new events except as noted.     SUBJECTIVE/ROS:  resting  nad  family at bedside      MEDICATIONS:  MEDICATIONS  (STANDING):  carvedilol 3.125 milliGRAM(s) Oral every 12 hours  dextrose 5%. 1000 milliLiter(s) (50 mL/Hr) IV Continuous <Continuous>  dextrose 50% Injectable 12.5 Gram(s) IV Push once  dextrose 50% Injectable 25 Gram(s) IV Push once  dextrose 50% Injectable 25 Gram(s) IV Push once  furosemide    Tablet 40 milliGRAM(s) Oral daily  heparin  Injectable 5000 Unit(s) SubCutaneous every 8 hours  insulin lispro (HumaLOG) corrective regimen sliding scale   SubCutaneous three times a day before meals  insulin lispro (HumaLOG) corrective regimen sliding scale   SubCutaneous at bedtime  phenazopyridine 100 milliGRAM(s) Oral every 8 hours  sodium chloride 3%  Inhalation 4 milliLiter(s) Inhalation every 6 hours      PHYSICAL EXAM:  T(C): 36.6 (03-27-19 @ 05:50), Max: 36.6 (03-27-19 @ 05:50)  HR: 76 (03-27-19 @ 05:50) (71 - 84)  BP: 124/67 (03-27-19 @ 05:50) (108/62 - 124/67)  RR: 18 (03-27-19 @ 05:50) (18 - 18)  SpO2: 99% (03-27-19 @ 05:50) (99% - 99%)  Wt(kg): --  I&O's Summary    26 Mar 2019 07:01  -  27 Mar 2019 07:00  --------------------------------------------------------  IN: 0 mL / OUT: 300 mL / NET: -300 mL            JVP: Normal  Neck: supple  Lung: clear   CV: S1 S2 , Murmur:  Abd: soft  Ext: No edema  neuro: Awake / alert  Psych: flat affect  Skin: normal``    LABS/DATA:    CARDIAC MARKERS:                                10.1   6.99  )-----------( 311      ( 27 Mar 2019 06:58 )             31.2     03-26    140  |  106  |  47<H>  ----------------------------<  217<H>  4.9   |  20<L>  |  1.77<H>    Ca    8.4      26 Mar 2019 06:40      proBNP:   Lipid Profile:   HgA1c:   TSH:     TELE:  EKG:

## 2019-03-27 NOTE — PROGRESS NOTE ADULT - ASSESSMENT
acute diastolic chf  pulm HTN  stable  cont current meds  cont diuretics     HAVEN  stable   fu labs today , if crt is more elevated, then will need to hold lasix     DM  Monitor finger stick. Insulin coverage.

## 2019-03-27 NOTE — PROGRESS NOTE ADULT - ASSESSMENT
87yo [place]nian-speaking M with PMHx of DM, hard of hearing presenting for presented with AMS and respiratory distress following an unwitnessed fall, found to have L femoral neck fracture, treated for possible sepsis likely 2/2 multifocal PNA, and acute Hypoxic and hypercarbic respiratory failure, improved after diuresis for acute diastolic CHF, s/p L hip hemiarthroplasty 3/25, retaining urine again

## 2019-03-27 NOTE — ADVANCED PRACTICE NURSE CONSULT - ASSESSMENT
General: Pt hard of hearing, no caregivers at bedside, difficult to assess mental status due to poor hearing. Pt cooperative and able to follow simple commands. Currently bedbound, hip abduction brace in place (posterior dislocation precautions taken). Pt incontinent of formed stool. Skin warm, dry with increased moisture in intertriginous folds, adequate skin turgor, scattered areas of hyperpigmentation and hypopigmentation, scattered areas of ecchymosis on bilateral upper extremities, s/p fall. Left hip with dressing clean dry and intact. Blanchable erythema on bilateral heels.    Right buttocks- evolving deep tissue pressure injury- as per nursing staff pt had difficulty turning and positioning prior to surgical procedure due to pain associated with fracture. During entirety of hospital stay pt on low air-loss support surface. On assessment today pt continues on low air-loss support surface, fluidized positioning pillow in place, fluidized z-alexis boots in place. Patient turned to left side during assessment- right buttocks- [id number]- well defined irregular borders with 25% purple-maroon discoloration with intact and deroofed blisters from 12-5 o'clock, exposing areas of minimal moist darkened dermis, 25% black-hard firmly attached eschar from 9-12 o'clock of wound base, 50% minimally moist, pale-pink exposed dermis. No drainage noted. (+) induration beneath site of injury that does not extend beyond well demarcated borders. Blanchable erythema from 8-12 o'clock extending 3cm from wound edge, no edema, no increased warmth noted. Goals of care: decrease bioburden, autolytic debridement of necrotic tissue, protect periwound skin, continue to monitor for tissue type changes.    Incontinence associated dermatitis- blanchable erythema of perineum extending to bilateral buttocks.

## 2019-03-27 NOTE — ADVANCED PRACTICE NURSE CONSULT - RECOMMEDATIONS
Serum pre-albumin with next blood draws.  Nutrition consult patient with evolving deep tissue pressure injury.  Diabetes education HgB A1C 9.7%.    Topical Recommendations:  Right buttocks- Cleanse wound and periwound skin with SAF-clens, rinse with NS, pat dry. Apply Liquid barrier film to periwound skin. Apply Medihoney gel to wound base, cover with silicone foam with border. Monitor for tissue type changes.     Continue low air loss bed therapy, continue heel elevation with Z-flex fluidized positioning boots, continue to turn & reposition q2h with Z-alexis fluidized positioning device while maintaining posterior dislocation precautions as ordered, soft pillow between bony prominences, continue incontinence management with Pedro moisture barrier cream & single breathable pad, continue measures to decrease friction/shear/pressure.     Continue with nutritional support as per dietary/orders.    Findings and plan discussed with patient and primary team. Patient educated on topical wound therapy.    Please contact Wound Care Service Line if we can be of further assistance (ext [phone]). Serum pre-albumin with next blood draws.  Nutrition consult patient with evolving deep tissue pressure injury.  Diabetes education HgB A1C 9.7%.    Topical Recommendations:  Right buttocks- Cleanse wound and periwound skin with SAF-clens, rinse with NS, pat dry. Apply Liquid barrier film to periwound skin. Apply Medihoney gel to wound base, cover with silicone foam with border. Change daily. Monitor for tissue type changes.    Continue low air loss bed therapy, continue heel elevation with Z-flex fluidized positioning boots, continue to turn & reposition q2h with Z-alexis fluidized positioning device while maintaining posterior dislocation precautions as ordered, soft pillow between bony prominences, continue incontinence management with Pedro moisture barrier cream & single breathable pad, continue measures to decrease friction/shear/pressure.     Continue with nutritional support as per dietary/orders.    Findings and plan discussed with patient and primary team. Patient educated on topical wound therapy.    Please contact Wound Care Service Line if we can be of further assistance (ext [phone]).

## 2019-03-27 NOTE — CONSULT NOTE ADULT - SUBJECTIVE AND OBJECTIVE BOX
HPI:  Pt is a 87yo [place]nian-speaking M with PMHx of DM2 (A1C 9.7), hard of hearing, DVT in the past, skin cancer (s/p excision) presenting with AMS and respiratory distress following an unwitnessed fall yesterday. Pt awake and alert, but not answering questions with telephone [place]ian , but was able to answer simple questions after son arrived at bedside. Per son, the pt had been in his usual state of health, when the family heard a thud yesterday while the patient was in the bathroom. They found him on the floor and called 911. Of note, [place] just moved to [place] approximately 1 year ago and lives with son.  Patient AAOx2-3 at baseline, walks around, although recently becoming more dependent on chair. As per son, patient has not had any recent illnesses, fevers, cough, abd discomfort, vomiting, diarrhea.     In the ED:   Vital signs: temp 99; , BP 96/25, 97% on supplemental oxygen. RR 20s. Labs notable for mixed respiratory and metabolic acidosis with pH 7.13 lactate 7.3, BHB 0.8, glucose 400s-500s,  K of 2.2 on BMP but 5 on ABG. EKG unremarkable, trop 37 at 1AM, 93 at 11:30AM without CK elevation, CT c/a/p with possible multifocal pna, L femoral neck fx. S/p calcium gluconate x1, 2LNS, 1L LR, vanc, zosyn, 5U of humalog. Patient was placed on BIPAP with improvement in saturation though still in respiratory distress. Patient was seen and examined. Used [place]ian  however difficult for patient to hear / understand, pt was only groaning.  Patient awake, makes eye contact. After son arrived, was able to answer some simple questions. (14 Mar 2019 14:32)    Endocrine History:   Hx as per son on phone  Has hx of DM 2 for past 4 years. Takes metformin 500mg daily. Does not have a glucometer at home. Follows with PMD. Diet eats small meals, eggs, oatmeal, whole grain bread, chicken/vegetables. Only drinks water and tea w/o sugar. Has not followed with optho, no known retinopathy. No known hx of neuropathy.      PAST MEDICAL & SURGICAL HISTORY:  Deep vein thrombosis (DVT): R leg s/p anticoagulation  [place] (hard of hearing)  Dementia  Hyperlipidemia  Cancer of skin      FAMILY HISTORY:  No family hx of DM2      Social History: Denies alcohol or smoking hx as per son    Outpatient Medications:  asa 81mg   metformin 500mg daily  lipitor    MEDICATIONS  (STANDING):  aspirin 325 milliGRAM(s) Oral two times a day  carvedilol 3.125 milliGRAM(s) Oral every 12 hours  dextrose 5%. 1000 milliLiter(s) (50 mL/Hr) IV Continuous <Continuous>  dextrose 50% Injectable 12.5 Gram(s) IV Push once  dextrose 50% Injectable 25 Gram(s) IV Push once  dextrose 50% Injectable 25 Gram(s) IV Push once  furosemide    Tablet 40 milliGRAM(s) Oral daily  heparin  Injectable 5000 Unit(s) SubCutaneous every 8 hours  insulin lispro (HumaLOG) corrective regimen sliding scale   SubCutaneous three times a day before meals  insulin lispro (HumaLOG) corrective regimen sliding scale   SubCutaneous at bedtime  phenazopyridine 100 milliGRAM(s) Oral every 8 hours  sodium chloride 3%  Inhalation 4 milliLiter(s) Inhalation every 6 hours  tamsulosin 0.4 milliGRAM(s) Oral at bedtime    MEDICATIONS  (PRN):  acetaminophen   Tablet .. 650 milliGRAM(s) Oral every 6 hours PRN Mild Pain (1 - 3), Moderate Pain (4 - 6), Severe Pain (7 - 10)  acetaminophen   Tablet .. 650 milliGRAM(s) Oral every 6 hours PRN Temp greater or equal to 38C (100.4F)  acetaminophen  Suppository .. 650 milliGRAM(s) Rectal every 6 hours PRN Temp greater or equal to 38C (100.4F)  ALBUTerol/ipratropium for Nebulization 3 milliLiter(s) Nebulizer every 6 hours PRN Shortness of Breath and/or Wheezing  dextrose 40% Gel 15 Gram(s) Oral once PRN Blood Glucose LESS THAN 70 milliGRAM(s)/deciliter  glucagon  Injectable 1 milliGRAM(s) IntraMuscular once PRN Glucose LESS THAN 70 milligrams/deciliter  lidocaine 2% Gel 1 Application(s) Topical every 6 hours PRN discomfort/pain  QUEtiapine 25 milliGRAM(s) Oral every 6 hours PRN agitation      Allergies    No Known Allergies    Intolerances      Review of Systems:    UNABLE TO OBTAIN    PHYSICAL EXAM:  VITALS: T(C): 36.6 (03-27-19 @ 05:50)  T(F): 97.8 (03-27-19 @ 05:50), Max: 97.8 (03-27-19 @ 05:50)  HR: 70 (03-27-19 @ 16:19) (70 - 83)  BP: 124/67 (03-27-19 @ 05:50) (124/67 - 124/67)  RR:  (18 - 18)  SpO2:  (96% - 99%)  Wt(kg): --  GENERAL: NAD, well-groomed, well-developed, thin  EYES: No proptosis, no lid lag, anicteric  HEENT:  Atraumatic, Normocephalic, moist mucous membranes  THYROID: Normal size, no palpable nodules  RESPIRATORY: Clear to auscultation bilaterally; No rales, rhonchi, wheezing  CARDIOVASCULAR: Regular rate and rhythm; No murmurs; no peripheral edema  GI: Soft, nontender, non distended, normal bowel sounds  SKIN: Dry, intact, No rashes or lesions  MUSCULOSKELETAL: Full range of motion, normal strength  NEURO: sensation intact, extraocular movements intact, no tremor  PSYCH: Alert and oriented x 3, normal affect, normal mood  CUSHING'S SIGNS: no striae    POCT Blood Glucose.: 274 mg/dL (03-27-19 @ 16:39)  POCT Blood Glucose.: 199 mg/dL (03-27-19 @ 12:30)  POCT Blood Glucose.: 170 mg/dL (03-27-19 @ 07:46)  POCT Blood Glucose.: 184 mg/dL (03-27-19 @ 07:35)  POCT Blood Glucose.: 212 mg/dL (03-26-19 @ 22:20)  POCT Blood Glucose.: 176 mg/dL (03-26-19 @ 16:43)  POCT Blood Glucose.: 239 mg/dL (03-26-19 @ 11:49)  POCT Blood Glucose.: 199 mg/dL (03-26-19 @ 07:51)  POCT Blood Glucose.: 202 mg/dL (03-25-19 @ 21:47)  POCT Blood Glucose.: 156 mg/dL (03-25-19 @ 11:57)  POCT Blood Glucose.: 151 mg/dL (03-25-19 @ 07:36)  POCT Blood Glucose.: 155 mg/dL (03-24-19 @ 21:19)                            10.1   6.99  )-----------( 311      ( 27 Mar 2019 06:58 )             31.2       03-27    139  |  105  |  48<H>  ----------------------------<  201<H>  4.6   |  19<L>  |  1.77<H>    EGFR if : 39  EGFR if non : 33    Ca    8.2<L>      03-27  Mg     2.1     03-27  Phos  3.7     03-27    TPro  6.4  /  Alb  2.8<L>  /  TBili  0.6  /  DBili  x   /  AST  30  /  ALT  32  /  AlkPhos  68  03-25      Thyroid Function Tests:  03-14 @ 01:45 TSH 2.09 FreeT4 -- T3 -- Anti TPO -- Anti Thyroglobulin Ab -- TSI --      Hemoglobin A1C, Whole Blood: 9.7 % <H> [4.0 - 5.6] (03-27-19 @ 05:00)

## 2019-03-27 NOTE — PROGRESS NOTE ADULT - PROBLEM SELECTOR PLAN 8
Problem: [place] (,NICU)  Goal: Signs and Symptoms of Listed Potential Problems Will be Absent, Minimized or Managed ([place])  Signs and symptoms of listed potential problems will be absent, minimized or managed by discharge/transition of care (reference  ([place],NICU) CPG).   Outcome: Improving  VS are stable.  Breastfeeding every 1-4 hours on demand.  Baby was skin to skin most of the time. Positive feedback offered to parents. Is content between feedings. Is voiding. Is stooling.Does not have  episodes of regurgitation.  Night feeding plan; breastfeeding; staying in room  Weight: 3.115 kg (6 lb 13.9 oz)  Percent Weight Change Since Birth: -1.7  Lab Results   Component Value Date    BILITOTAL 2018     Next  TCB at discharge  Parents are participating in  cares and gaining in confidence. Will continue to monitor and assess. Encouraged unrestricted feedings on cue, 8-12 times in 24 hours.       c/w statin

## 2019-03-27 NOTE — CONSULT NOTE ADULT - ASSESSMENT
89yo [place]nian-speaking M with PMHx of DM2 (A1C 9.7), hard of hearing, DVT in the past, skin cancer (s/p excision) presenting with AMS and respiratory distress following an unwitnessed fall. Found to have L femoral neck fracture, treated for possible sepsis likely 2/2 multifocal PNA, and acute Hypoxic and hypercarbic respiratory failure, improved after diuresis for acute diastolic CHF. Endocrine consulted for DM2 management.

## 2019-03-27 NOTE — CONSULT NOTE ADULT - ATTENDING COMMENTS
88M uncontrolled DM2. Uninsured.  Borderline DKA on admission.  Would consider discharge on NPH regimen TBD (can consider once daily dosing in AM if family unable to do twice daily).  Outpatient follow up [facility] endocrine clinic [phone]. 88M uncontrolled DM2. Uninsured.  Borderline DKA on admission.  Would consider discharge on NPH regimen TBD (can consider once daily dosing in AM if family unable to do twice daily).  Given GFR 33 would likely stop metformin at [place].  Outpatient follow up [facility] endocrine clinic [phone].

## 2019-03-27 NOTE — PROGRESS NOTE ADULT - SUBJECTIVE AND OBJECTIVE BOX
Patient is a 89y old  Male who presents with a chief complaint of fall (27 Mar 2019 16:54)      SUBJECTIVE / OVERNIGHT EVENTS:  Pt seen earlier, PT tried to work with patient but pt unable to stand, knees buckled.  Also found to be retaining urine.    MEDICATIONS  (STANDING):  aspirin 325 milliGRAM(s) Oral two times a day  carvedilol 3.125 milliGRAM(s) Oral every 12 hours  dextrose 5%. 1000 milliLiter(s) (50 mL/Hr) IV Continuous <Continuous>  dextrose 50% Injectable 12.5 Gram(s) IV Push once  dextrose 50% Injectable 25 Gram(s) IV Push once  dextrose 50% Injectable 25 Gram(s) IV Push once  furosemide    Tablet 40 milliGRAM(s) Oral daily  heparin  Injectable 5000 Unit(s) SubCutaneous every 8 hours  insulin lispro (HumaLOG) corrective regimen sliding scale   SubCutaneous three times a day before meals  insulin lispro (HumaLOG) corrective regimen sliding scale   SubCutaneous at bedtime  phenazopyridine 100 milliGRAM(s) Oral every 8 hours  sodium chloride 3%  Inhalation 4 milliLiter(s) Inhalation every 6 hours  tamsulosin 0.4 milliGRAM(s) Oral at bedtime    MEDICATIONS  (PRN):  acetaminophen   Tablet .. 650 milliGRAM(s) Oral every 6 hours PRN Mild Pain (1 - 3), Moderate Pain (4 - 6), Severe Pain (7 - 10)  acetaminophen   Tablet .. 650 milliGRAM(s) Oral every 6 hours PRN Temp greater or equal to 38C (100.4F)  acetaminophen  Suppository .. 650 milliGRAM(s) Rectal every 6 hours PRN Temp greater or equal to 38C (100.4F)  ALBUTerol/ipratropium for Nebulization 3 milliLiter(s) Nebulizer every 6 hours PRN Shortness of Breath and/or Wheezing  dextrose 40% Gel 15 Gram(s) Oral once PRN Blood Glucose LESS THAN 70 milliGRAM(s)/deciliter  glucagon  Injectable 1 milliGRAM(s) IntraMuscular once PRN Glucose LESS THAN 70 milligrams/deciliter  lidocaine 2% Gel 1 Application(s) Topical every 6 hours PRN discomfort/pain  QUEtiapine 25 milliGRAM(s) Oral every 6 hours PRN agitation      CAPILLARY BLOOD GLUCOSE      POCT Blood Glucose.: 274 mg/dL (27 Mar 2019 16:39)  POCT Blood Glucose.: 199 mg/dL (27 Mar 2019 12:30)  POCT Blood Glucose.: 170 mg/dL (27 Mar 2019 07:46)  POCT Blood Glucose.: 184 mg/dL (27 Mar 2019 07:35)  POCT Blood Glucose.: 212 mg/dL (26 Mar 2019 22:20)      I&O's Summary    26 Mar 2019 07:01  -  27 Mar 2019 07:00  --------------------------------------------------------  IN: 0 mL / OUT: 300 mL / NET: -300 mL        Vital Signs Last 24 Hrs  T(C): 36.6 (27 Mar 2019 05:50), Max: 36.6 (27 Mar 2019 05:50)  T(F): 97.8 (27 Mar 2019 05:50), Max: 97.8 (27 Mar 2019 05:50)  HR: 70 (27 Mar 2019 16:19) (70 - 83)  BP: 124/67 (27 Mar 2019 05:50) (124/67 - 124/67)  BP(mean): --  RR: 18 (27 Mar 2019 05:50) (18 - 18)  SpO2: 98% (27 Mar 2019 16:19) (96% - 99%)    PHYSICAL EXAM:  GENERAL: elderly M, lying flat in bed, NAD, [place]  HEAD:  Atraumatic, Normocephalic  EYES: EOMI, PERRLA, conjunctiva and sclera clear  NECK: Supple, No JVD  CHEST/LUNG: Clear to auscultation bilaterally; No wheeze  HEART: Regular rate and rhythm; No murmurs, rubs, or gallops  ABDOMEN: Soft, Nontender, Nondistended; Bowel sounds present  EXTREMITIES:  2+ Peripheral Pulses, No clubbing, cyanosis, or edema  PSYCH: AAOx1  NEUROLOGY: non-focal  SKIN: No rashes or lesions  +concepcion  L lateral hip dressing intact    LABS:                        10.1   6.99  )-----------( 311      ( 27 Mar 2019 06:58 )             31.2     03-27    139  |  105  |  48<H>  ----------------------------<  201<H>  4.6   |  19<L>  |  1.77<H>    Ca    8.2<L>      27 Mar 2019 06:58  Phos  3.7     03-27  Mg     2.1     03-27                RADIOLOGY & ADDITIONAL TESTS:    Imaging Personally Reviewed:    Consultant(s) Notes Reviewed:  cards    Care Discussed with Consultants/Other Providers: RN, SW, CM, ADS, ortho re overall care

## 2019-03-27 NOTE — CONSULT NOTE ADULT - CONSULT REASON
Acute dyspnea
Hypercapnia and Pre-OP
Hypoxic Respiratory Failure
L femoral neck fracture
Uncontrolled Type 2 DM
cardiac eval
HAVEN

## 2019-03-27 NOTE — CONSULT NOTE ADULT - PROBLEM SELECTOR RECOMMENDATION 9
-Patient with uncontrolled DM2 (A1C 9.7)  -given patient's age an comorbidities, goal A1C 8%, with risk of hypoglycemia  -Currently just on moderate correctional scale. When initially presented BG was 500s, AG mild increase to 18. BHB 0.8.  -Would add lantus 10 units qhs and monitor on low dose humalog correctional scale with meals and bedtime  -For discharge, would continue with metformin 500mg daily and add -Patient with uncontrolled DM2 (A1C 9.7)  -given patient's age an comorbidities, goal A1C 8%, with risk of hypoglycemia  -Currently just on moderate correctional scale. When initially presented BG was 500s, AG 18, bicarb 11, BHB 0.8.  -check c-peptide  -Would add lantus 10 units qhs and monitor on low dose humalog correctional scale with meals and bedtime  -For discharge, based on c-peptide and fact that patient presented in mild DKA, may need insulin, possible 1X/daily NPH.

## 2019-03-27 NOTE — PROGRESS NOTE ADULT - ATTENDING COMMENTS
I agree with the above note and have personally seen and examined this patient. All pertinent films have been reviewed. Please refer to clinical documentation of the history, physical examinations, data summary, and both assessment and plan as documented above and with which I agree.    oob yesterday to chair  aquacel peeling off, ortho team to replace today, keep in place for 2 weeks  rayne hair MD  Attending Orthopedic Surgeon I agree with the above note and have personally seen and examined this patient. All pertinent films have been reviewed. Please refer to clinical documentation of the history, physical examinations, data summary, and both assessment and plan as documented above and with which I agree.    oob yesterday to chair  aquacel peeling off, ortho team to replace today, keep in place for 2 weeks  concepcion out, tov  primary team ordered for sqh for dvt ppx, suggest stronger a/c as sqh insufficient for dvt ppx in this postoperative setting, consider coumadin or renal dosed lovenox or eliquis    Abdiel Shabazz MD  Attending Orthopedic Surgeon

## 2019-03-27 NOTE — PROGRESS NOTE ADULT - SUBJECTIVE AND OBJECTIVE BOX
Patient slept well overnight. Pain well controlled with pain medications. Out of bed yesterday with PT.    Vital Signs Last 24 Hrs  T(C): 36.6 (27 Mar 2019 05:50), Max: 36.6 (27 Mar 2019 05:50)  T(F): 97.8 (27 Mar 2019 05:50), Max: 97.8 (27 Mar 2019 05:50)  HR: 76 (27 Mar 2019 05:50) (71 - 84)  BP: 124/67 (27 Mar 2019 05:50) (108/62 - 124/67)  BP(mean): --  RR: 18 (27 Mar 2019 05:50) (18 - 18)  SpO2: 99% (27 Mar 2019 05:50) (99% - 99%)    Exam:  Gen: NAD  Motor: wiggles toes  Sensory: unable to assess  Dressing: Clean, Dry, Intact    A/P: 89 year old male s/p L Hip hemiarthroplast  - Pain Control: No narcotics  - Posterior hip precautions   - Coumadin when possible per Medicine  - WBAT  - D/C Jose  - PT/OT, OOB  - Discharge Planning

## 2019-03-27 NOTE — ADVANCED PRACTICE NURSE CONSULT - REASON FOR CONSULT
Patient seen on skin care rounds after wound care referral received for assessment of skin impairment and recommendations of topical management. Chart reviewed: WBC range 4.73 K/uL- 13.34 k/uL, Hgb range 9.9 - 16.0, Hct range 31.1 - 40.4, Plt range 79 - 311, INR 1.05, PCOT range , HgbA1C 9.7%, Serum albumin range 1.8 - 2.8g/dl. Temperature range 96.8 - 101 F, HR  bpm, -156mmHg, RR 12-25 breaths/min, SpO2 . Ervin range 11-14, BMI 24.3kg/m2. Patient interviewed. Patient H/O DM, hard of hearing presented with AMS and respiratory distress following an unwitnessed fall, found to have L femoral neck fracture s/p Left hip hemiarthroplasty on 2/25. Additionally treated for possible sepsis likely 2/2 multifocal PNA, and acute Hypoxic and hypercarbic respiratory failure, improved after diuresis for acute diastolic CHF. Patient seen and followed by Nephrology for HAVEN, Pulmonology for hypercapnia and preoperatively, Medical ICU for acute dyspnea, Orthopedics for left femoral head fracture, Cardiology for Diastolic CHF.

## 2019-03-27 NOTE — CONSULT NOTE ADULT - CONSULT REQUESTED DATE/TIME
14-Mar-2019
14-Mar-2019 05:22
14-Mar-2019 16:11
18-Mar-2019 16:12
27-Mar-2019 16:55
19-Mar-2019 16:22
18-Mar-2019 18:06

## 2019-03-28 DIAGNOSIS — M80.00XD AGE-RELATED OSTEOPOROSIS WITH CURRENT PATHOLOGICAL FRACTURE, UNSPECIFIED SITE, SUBSEQUENT ENCOUNTER FOR FRACTURE WITH ROUTINE HEALING: ICD-10-CM

## 2019-03-28 LAB
ANION GAP SERPL CALC-SCNC: 15 MMO/L — HIGH (ref 7–14)
APTT BLD: 22.4 SEC — LOW (ref 27.5–36.3)
BUN SERPL-MCNC: 39 MG/DL — HIGH (ref 7–23)
C PEPTIDE SERPL-MCNC: 4.8 NG/ML — HIGH (ref 1.1–4.4)
CALCIUM SERPL-MCNC: 8.5 MG/DL — SIGNIFICANT CHANGE UP (ref 8.4–10.5)
CHLORIDE SERPL-SCNC: 102 MMOL/L — SIGNIFICANT CHANGE UP (ref 98–107)
CO2 SERPL-SCNC: 20 MMOL/L — LOW (ref 22–31)
CREAT SERPL-MCNC: 1.55 MG/DL — HIGH (ref 0.5–1.3)
GLUCOSE BLDC GLUCOMTR-MCNC: 161 MG/DL — HIGH (ref 70–99)
GLUCOSE BLDC GLUCOMTR-MCNC: 210 MG/DL — HIGH (ref 70–99)
GLUCOSE BLDC GLUCOMTR-MCNC: 241 MG/DL — HIGH (ref 70–99)
GLUCOSE BLDC GLUCOMTR-MCNC: 243 MG/DL — HIGH (ref 70–99)
GLUCOSE BLDC GLUCOMTR-MCNC: 296 MG/DL — HIGH (ref 70–99)
GLUCOSE SERPL-MCNC: 238 MG/DL — HIGH (ref 70–99)
HCT VFR BLD CALC: 31.3 % — LOW (ref 39–50)
HGB BLD-MCNC: 9.8 G/DL — LOW (ref 13–17)
INR BLD: 1.3 — HIGH (ref 0.88–1.17)
MAGNESIUM SERPL-MCNC: 2 MG/DL — SIGNIFICANT CHANGE UP (ref 1.6–2.6)
MCHC RBC-ENTMCNC: 29.9 PG — SIGNIFICANT CHANGE UP (ref 27–34)
MCHC RBC-ENTMCNC: 31.3 % — LOW (ref 32–36)
MCV RBC AUTO: 95.4 FL — SIGNIFICANT CHANGE UP (ref 80–100)
NRBC # FLD: 0 K/UL — SIGNIFICANT CHANGE UP (ref 0–0)
PHOSPHATE SERPL-MCNC: 2.6 MG/DL — SIGNIFICANT CHANGE UP (ref 2.5–4.5)
PLATELET # BLD AUTO: 338 K/UL — SIGNIFICANT CHANGE UP (ref 150–400)
PMV BLD: 9.6 FL — SIGNIFICANT CHANGE UP (ref 7–13)
POTASSIUM SERPL-MCNC: 3.7 MMOL/L — SIGNIFICANT CHANGE UP (ref 3.5–5.3)
POTASSIUM SERPL-SCNC: 3.7 MMOL/L — SIGNIFICANT CHANGE UP (ref 3.5–5.3)
PREALB SERPL-MCNC: 7 MG/DL — LOW (ref 20–40)
PROTHROM AB SERPL-ACNC: 14.6 SEC — HIGH (ref 9.8–13.1)
RBC # BLD: 3.28 M/UL — LOW (ref 4.2–5.8)
RBC # FLD: 12.8 % — SIGNIFICANT CHANGE UP (ref 10.3–14.5)
SODIUM SERPL-SCNC: 137 MMOL/L — SIGNIFICANT CHANGE UP (ref 135–145)
WBC # BLD: 6.44 K/UL — SIGNIFICANT CHANGE UP (ref 3.8–10.5)
WBC # FLD AUTO: 6.44 K/UL — SIGNIFICANT CHANGE UP (ref 3.8–10.5)

## 2019-03-28 PROCEDURE — 99232 SBSQ HOSP IP/OBS MODERATE 35: CPT | Mod: GC

## 2019-03-28 PROCEDURE — 99232 SBSQ HOSP IP/OBS MODERATE 35: CPT

## 2019-03-28 PROCEDURE — 99233 SBSQ HOSP IP/OBS HIGH 50: CPT

## 2019-03-28 RX ORDER — INSULIN GLARGINE 100 [IU]/ML
12 INJECTION, SOLUTION SUBCUTANEOUS AT BEDTIME
Qty: 0 | Refills: 0 | Status: DISCONTINUED | OUTPATIENT
Start: 2019-03-28 | End: 2019-03-29

## 2019-03-28 RX ORDER — INSULIN LISPRO 100/ML
2 VIAL (ML) SUBCUTANEOUS
Qty: 0 | Refills: 0 | Status: DISCONTINUED | OUTPATIENT
Start: 2019-03-28 | End: 2019-03-29

## 2019-03-28 RX ORDER — WARFARIN SODIUM 2.5 MG/1
2.5 TABLET ORAL ONCE
Qty: 0 | Refills: 0 | Status: COMPLETED | OUTPATIENT
Start: 2019-03-28 | End: 2019-03-28

## 2019-03-28 RX ORDER — LANOLIN ALCOHOL/MO/W.PET/CERES
3 CREAM (GRAM) TOPICAL ONCE
Qty: 0 | Refills: 0 | Status: COMPLETED | OUTPATIENT
Start: 2019-03-28 | End: 2019-03-28

## 2019-03-28 RX ORDER — WARFARIN SODIUM 2.5 MG/1
5 TABLET ORAL ONCE
Qty: 0 | Refills: 0 | Status: DISCONTINUED | OUTPATIENT
Start: 2019-03-28 | End: 2019-03-28

## 2019-03-28 RX ORDER — INSULIN LISPRO 100/ML
VIAL (ML) SUBCUTANEOUS AT BEDTIME
Qty: 0 | Refills: 0 | Status: DISCONTINUED | OUTPATIENT
Start: 2019-03-28 | End: 2019-03-29

## 2019-03-28 RX ADMIN — QUETIAPINE FUMARATE 25 MILLIGRAM(S): 200 TABLET, FILM COATED ORAL at 21:53

## 2019-03-28 RX ADMIN — WARFARIN SODIUM 2.5 MILLIGRAM(S): 2.5 TABLET ORAL at 18:52

## 2019-03-28 RX ADMIN — Medication 1: at 23:21

## 2019-03-28 RX ADMIN — Medication 325 MILLIGRAM(S): at 05:41

## 2019-03-28 RX ADMIN — TAMSULOSIN HYDROCHLORIDE 0.4 MILLIGRAM(S): 0.4 CAPSULE ORAL at 21:53

## 2019-03-28 RX ADMIN — Medication 3 MILLIGRAM(S): at 23:47

## 2019-03-28 RX ADMIN — CARVEDILOL PHOSPHATE 3.12 MILLIGRAM(S): 80 CAPSULE, EXTENDED RELEASE ORAL at 18:52

## 2019-03-28 RX ADMIN — Medication 40 MILLIGRAM(S): at 05:41

## 2019-03-28 RX ADMIN — SODIUM CHLORIDE 4 MILLILITER(S): 9 INJECTION INTRAMUSCULAR; INTRAVENOUS; SUBCUTANEOUS at 22:00

## 2019-03-28 RX ADMIN — HEPARIN SODIUM 5000 UNIT(S): 5000 INJECTION INTRAVENOUS; SUBCUTANEOUS at 13:19

## 2019-03-28 RX ADMIN — HEPARIN SODIUM 5000 UNIT(S): 5000 INJECTION INTRAVENOUS; SUBCUTANEOUS at 23:21

## 2019-03-28 RX ADMIN — Medication 1: at 16:56

## 2019-03-28 RX ADMIN — SODIUM CHLORIDE 4 MILLILITER(S): 9 INJECTION INTRAMUSCULAR; INTRAVENOUS; SUBCUTANEOUS at 17:33

## 2019-03-28 RX ADMIN — Medication 650 MILLIGRAM(S): at 21:53

## 2019-03-28 RX ADMIN — Medication 2: at 07:35

## 2019-03-28 RX ADMIN — HEPARIN SODIUM 5000 UNIT(S): 5000 INJECTION INTRAVENOUS; SUBCUTANEOUS at 05:41

## 2019-03-28 RX ADMIN — SODIUM CHLORIDE 4 MILLILITER(S): 9 INJECTION INTRAMUSCULAR; INTRAVENOUS; SUBCUTANEOUS at 10:54

## 2019-03-28 RX ADMIN — CARVEDILOL PHOSPHATE 3.12 MILLIGRAM(S): 80 CAPSULE, EXTENDED RELEASE ORAL at 05:41

## 2019-03-28 RX ADMIN — Medication 100 MILLIGRAM(S): at 05:41

## 2019-03-28 RX ADMIN — Medication 2: at 13:19

## 2019-03-28 RX ADMIN — INSULIN GLARGINE 12 UNIT(S): 100 INJECTION, SOLUTION SUBCUTANEOUS at 23:47

## 2019-03-28 NOTE — PROGRESS NOTE ADULT - ASSESSMENT
acute diastolic chf  pulm HTN  stable  cont current meds  cont diuretics     HAVEN  stable   crt stable     DM  Monitor finger stick. Insulin coverage.

## 2019-03-28 NOTE — CHART NOTE - NSCHARTNOTEFT_GEN_A_CORE
Patient seen for nutrient consult : Nutrition Services - Assessment + nutrition follow up.     Per Chart: Patient is an 87yo Bosnian-speaking M with PMHx of DM, hard of hearing presenting for presented with AMS and respiratory distress following an unwitnessed fall, found to have L femoral neck fracture, treated for possible sepsis likely 2/2 multifocal PNA, and acute Hypoxic and hypercarbic respiratory failure, improved after diuresis for acute diastolic CHF, s/p L hip hemiarthroplasty 3/25, retaining urine again.       Source: Patient  [ ]    Family [x]     other [ ]: Grandson at bedside, EMR     Current Diet : Dysphagia 3 Soft- Thin liquid + Consistent Carbohydrate + DASH + Halal     Reported: Grandson denies any nausea/vomiting/diarrhea/constipation. Patient with poor dentition, however grandson reported patient takes their time with eating- denies any difficulty chewing and swallowing.     PO intake:  < 50% [ ] 50-75% [ ]   % [x]  other : Patient consuming about 100% of meals with feeding assistance. Patient depending on the day will have variable PO intake- discussed with grandson about Glucerna supplement to help with protein/calorie intake on days with variable PO intake and grandson was amenable to trying out supplement.       Current Weight: Weight (kg) 160 pounds  Patient noted with + 3 edema- left hip.     __________________ Pertinent Medications__________________   MEDICATIONS  (STANDING):  carvedilol 3.125 milliGRAM(s) Oral every 12 hours  dextrose 5%. 1000 milliLiter(s) (50 mL/Hr) IV Continuous <Continuous>  dextrose 50% Injectable 12.5 Gram(s) IV Push once  dextrose 50% Injectable 25 Gram(s) IV Push once  dextrose 50% Injectable 25 Gram(s) IV Push once  furosemide    Tablet 40 milliGRAM(s) Oral daily  heparin  Injectable 5000 Unit(s) SubCutaneous every 8 hours  insulin glargine Injectable (LANTUS) 10 Unit(s) SubCutaneous at bedtime  insulin lispro (HumaLOG) corrective regimen sliding scale   SubCutaneous three times a day before meals  sodium chloride 3%  Inhalation 4 milliLiter(s) Inhalation every 6 hours  tamsulosin 0.4 milliGRAM(s) Oral at bedtime  warfarin 2.5 milliGRAM(s) Oral once    MEDICATIONS  (PRN):  acetaminophen   Tablet .. 650 milliGRAM(s) Oral every 6 hours PRN Mild Pain (1 - 3), Moderate Pain (4 - 6), Severe Pain (7 - 10)  acetaminophen   Tablet .. 650 milliGRAM(s) Oral every 6 hours PRN Temp greater or equal to 38C (100.4F)  acetaminophen  Suppository .. 650 milliGRAM(s) Rectal every 6 hours PRN Temp greater or equal to 38C (100.4F)  ALBUTerol/ipratropium for Nebulization 3 milliLiter(s) Nebulizer every 6 hours PRN Shortness of Breath and/or Wheezing  dextrose 40% Gel 15 Gram(s) Oral once PRN Blood Glucose LESS THAN 70 milliGRAM(s)/deciliter  glucagon  Injectable 1 milliGRAM(s) IntraMuscular once PRN Glucose LESS THAN 70 milligrams/deciliter  lidocaine 2% Gel 1 Application(s) Topical every 6 hours PRN discomfort/pain  QUEtiapine 25 milliGRAM(s) Oral every 6 hours PRN agitation      __________________ Pertinent Labs__________________   03-28 Na137 mmol/L Glu 238 mg/dL<H> K+ 3.7 mmol/L Cr  1.55 mg/dL<H> BUN 39 mg/dL<H> 03-28 Phos 2.6 mg/dL 03-25 Alb 2.8 g/dL<L> 03-28 PAB 7 mg/dL<L> 03-27 [id number] 9.7 %<H>  POCT: 243, 223, 274, 199 mg/dL     Skin: Patient noted with right buttock- suspected deep tissue (evolving)     Estimated Needs:   [x] no change since previous assessment  [ ] recalculated:       New Nutrition Diagnosis: [x] not applicable      Nutrition Recommendations:  1. Continue with current diet order  2. Recommend Glucerna Therapeutic Nutrition 240mls 2x daily (440kcals, 20g protein) to help with protein intake   3. Please Encourage po intake, assist with meals and menu selections, provide alternatives PRN.   4. Monitor weights, labs, BM's, skin integrity, p.o. intake.   5. RD to remain available for further nutritional interventions as indicated.-Sharon Ames, ESTEPHANIAN, CDN

## 2019-03-28 NOTE — PROGRESS NOTE ADULT - ASSESSMENT
89yo Bosnian-speaking M with PMHx of DM, hard of hearing presenting for presented with AMS and respiratory distress following an unwitnessed fall, found to have L femoral neck fracture, treated for possible sepsis likely 2/2 multifocal PNA, and acute Hypoxic and hypercarbic respiratory failure, improved after diuresis for acute diastolic CHF, s/p L hip hemiarthroplasty 3/25, retaining urine again

## 2019-03-28 NOTE — PROGRESS NOTE ADULT - ASSESSMENT
88 year old male with HLD, DVT s/p AC, dementia and hearing loss who presents to the hospital after a fall and hip fracture. Nephrology consulted for HAVEN.    HAVEN  - Patient with acute kidney injury likely multifactorial in nature.  -contrast causing the creatinine to rise leading to HAVEN from direct tubular damage ... improving now 1.5 and stable  - urinary retention failed voiding Garcia back  -avoid nephrotoxic medications (dose vancomycin daily with trough levels),   -maintain adequate perfusion pressures, and renally dose medications.    Nephrology service to sign off. Please page with any questions or concerns regarding this patients care.

## 2019-03-28 NOTE — PROGRESS NOTE ADULT - SUBJECTIVE AND OBJECTIVE BOX
Patient is a 89y old  Male who presents with a chief complaint of fall (28 Mar 2019 16:39)      SUBJECTIVE / OVERNIGHT EVENTS:  Pt seen earlier, resting comfortably.      MEDICATIONS  (STANDING):  carvedilol 3.125 milliGRAM(s) Oral every 12 hours  dextrose 5%. 1000 milliLiter(s) (50 mL/Hr) IV Continuous <Continuous>  dextrose 50% Injectable 12.5 Gram(s) IV Push once  dextrose 50% Injectable 25 Gram(s) IV Push once  dextrose 50% Injectable 25 Gram(s) IV Push once  furosemide    Tablet 40 milliGRAM(s) Oral daily  heparin  Injectable 5000 Unit(s) SubCutaneous every 8 hours  insulin glargine Injectable (LANTUS) 10 Unit(s) SubCutaneous at bedtime  insulin lispro (HumaLOG) corrective regimen sliding scale   SubCutaneous three times a day before meals  sodium chloride 3%  Inhalation 4 milliLiter(s) Inhalation every 6 hours  tamsulosin 0.4 milliGRAM(s) Oral at bedtime  warfarin 2.5 milliGRAM(s) Oral once    MEDICATIONS  (PRN):  acetaminophen   Tablet .. 650 milliGRAM(s) Oral every 6 hours PRN Mild Pain (1 - 3), Moderate Pain (4 - 6), Severe Pain (7 - 10)  acetaminophen   Tablet .. 650 milliGRAM(s) Oral every 6 hours PRN Temp greater or equal to 38C (100.4F)  acetaminophen  Suppository .. 650 milliGRAM(s) Rectal every 6 hours PRN Temp greater or equal to 38C (100.4F)  ALBUTerol/ipratropium for Nebulization 3 milliLiter(s) Nebulizer every 6 hours PRN Shortness of Breath and/or Wheezing  dextrose 40% Gel 15 Gram(s) Oral once PRN Blood Glucose LESS THAN 70 milliGRAM(s)/deciliter  glucagon  Injectable 1 milliGRAM(s) IntraMuscular once PRN Glucose LESS THAN 70 milligrams/deciliter  lidocaine 2% Gel 1 Application(s) Topical every 6 hours PRN discomfort/pain  QUEtiapine 25 milliGRAM(s) Oral every 6 hours PRN agitation      CAPILLARY BLOOD GLUCOSE      POCT Blood Glucose.: 161 mg/dL (28 Mar 2019 16:44)  POCT Blood Glucose.: 241 mg/dL (28 Mar 2019 12:54)  POCT Blood Glucose.: 210 mg/dL (28 Mar 2019 11:35)  POCT Blood Glucose.: 243 mg/dL (28 Mar 2019 07:28)  POCT Blood Glucose.: 223 mg/dL (27 Mar 2019 22:11)      I&O's Summary    27 Mar 2019 07:01  -  28 Mar 2019 07:00  --------------------------------------------------------  IN: 0 mL / OUT: 1832 mL / NET: -1832 mL    28 Mar 2019 07:01  -  28 Mar 2019 16:56  --------------------------------------------------------  IN: 0 mL / OUT: 600 mL / NET: -600 mL        Vital Signs Last 24 Hrs  T(C): 36.4 (28 Mar 2019 14:04), Max: 36.8 (28 Mar 2019 06:02)  T(F): 97.6 (28 Mar 2019 14:04), Max: 98.2 (28 Mar 2019 06:02)  HR: 95 (28 Mar 2019 14:04) (70 - 95)  BP: 138/82 (28 Mar 2019 14:04) (137/- - 138/82)  BP(mean): --  RR: 18 (28 Mar 2019 14:04) (18 - 18)  SpO2: 99% (28 Mar 2019 14:04) (99% - 99%)    PHYSICAL EXAM:  GENERAL: elderly M, lying flat in bed, NAD, [place]  HEAD:  Atraumatic, Normocephalic  EYES: EOMI, PERRLA, conjunctiva and sclera clear  NECK: Supple, No JVD  CHEST/LUNG: Clear to auscultation bilaterally; No wheeze  HEART: Regular rate and rhythm; No murmurs, rubs, or gallops  ABDOMEN: Soft, Nontender, Nondistended; Bowel sounds present  EXTREMITIES:  2+ Peripheral Pulses, No clubbing, cyanosis, or edema  PSYCH: AAOx1  NEUROLOGY: non-focal  SKIN: No rashes or lesions  +concepcion  L lateral hip dressing intact    LABS:                        9.8    6.44  )-----------( 338      ( 28 Mar 2019 06:00 )             31.3     03-28    137  |  102  |  39<H>  ----------------------------<  238<H>  3.7   |  20<L>  |  1.55<H>    Ca    8.5      28 Mar 2019 06:00  Phos  2.6     03-28  Mg     2.0     03-28      PT/INR - ( 28 Mar 2019 11:58 )   PT: 14.6 SEC;   INR: 1.30          PTT - ( 28 Mar 2019 11:58 )  PTT:22.4 SEC          RADIOLOGY & ADDITIONAL TESTS:    Imaging Personally Reviewed:    Consultant(s) Notes Reviewed:      Care Discussed with Consultants/Other Providers: RN, SW, CM, ADS re overall care; ortho re a/c

## 2019-03-28 NOTE — PROGRESS NOTE ADULT - SUBJECTIVE AND OBJECTIVE BOX
[facility] Division of Kidney Diseases & Hypertension  FOLLOW UP NOTE  [phone]--------------------------------------------------------------------------------  Chief Complaint:Respiratory failure    88 year old male with HLD, DVT s/p AC, dementia and hearing loss who presents to the hospital after a fall and hip fracture. Patient noted to have PNA on antibiotics and also hypercarbic respiratory failure requiring BiPAP at night. On admission patient received contrast load as well and creatinine started to climb after that. Patient's son stated he had not been eating and drinking as well at home either. Nephrology consulted for HAVEN. Patient's creatinine stable at 1.55. Sodium and other electrolytes normal as well.    Patient seen and examined at bedside. Appears to be resting comfortably.       PAST HISTORY  --------------------------------------------------------------------------------  No significant changes to PMH, PSH, FHx, SHx, unless otherwise noted    ALLERGIES & MEDICATIONS  --------------------------------------------------------------------------------  Allergies    No Known Allergies    Intolerances      Standing Inpatient Medications  carvedilol 3.125 milliGRAM(s) Oral every 12 hours  furosemide    Tablet 40 milliGRAM(s) Oral daily  heparin  Injectable 5000 Unit(s) SubCutaneous every 8 hours  insulin glargine Injectable (LANTUS) 12 Unit(s) SubCutaneous at bedtime  insulin lispro (HumaLOG) corrective regimen sliding scale   SubCutaneous three times a day before meals  insulin lispro (HumaLOG) corrective regimen sliding scale   SubCutaneous at bedtime  insulin lispro Injectable (HumaLOG) 2 Unit(s) SubCutaneous three times a day before meals  sodium chloride 3%  Inhalation 4 milliLiter(s) Inhalation every 6 hours  tamsulosin 0.4 milliGRAM(s) Oral at bedtime    PRN Inpatient Medications  acetaminophen   Tablet .. 650 milliGRAM(s) Oral every 6 hours PRN  acetaminophen   Tablet .. 650 milliGRAM(s) Oral every 6 hours PRN  acetaminophen  Suppository .. 650 milliGRAM(s) Rectal every 6 hours PRN  ALBUTerol/ipratropium for Nebulization 3 milliLiter(s) Nebulizer every 6 hours PRN  dextrose 40% Gel 15 Gram(s) Oral once PRN  glucagon  Injectable 1 milliGRAM(s) IntraMuscular once PRN  lidocaine 2% Gel 1 Application(s) Topical every 6 hours PRN  QUEtiapine 25 milliGRAM(s) Oral every 6 hours PRN      REVIEW OF SYSTEMS: Unable to obtain 2/2 clinical condition.    All other systems were reviewed and are negative, except as noted.    VITALS/PHYSICAL EXAM  --------------------------------------------------------------------------------  T(C): 36.3 (03-28-19 @ 19:22), Max: 36.8 (03-28-19 @ 06:02)  HR: 87 (03-28-19 @ 19:22) (70 - 95)  BP: 153/80 (03-28-19 @ 19:22) (138/69 - 153/80)  RR: 18 (03-28-19 @ 19:22) (18 - 18)  SpO2: 99% (03-28-19 @ 19:22) (99% - 99%)  Wt(kg): --        03-27-19 @ 07:01  -  03-28-19 @ 07:00  --------------------------------------------------------  IN: 0 mL / OUT: 1832 mL / NET: -1832 mL    03-28-19 @ 07:01  -  03-28-19 @ 19:52  --------------------------------------------------------  IN: 0 mL / OUT: 600 mL / NET: -600 mL      Physical Exam:  	 Gen: NAD,   	HEENT: supple neck, NO JVD   	Pulm: CTA B/L              CV: RRR, S1S2; no rub  	Abd: +BS, soft, nontender/nondistended  	LE:  no edema  	Neuro: No focal deficits,     LABS/STUDIES  --------------------------------------------------------------------------------              9.8    6.44  >-----------<  338      [03-28-19 @ 06:00]              31.3     137  |  102  |  39  ----------------------------<  238      [03-28-19 @ 06:00]  3.7   |  20  |  1.55        Ca     8.5     [03-28-19 @ 06:00]      Mg     2.0     [03-28-19 @ 06:00]      Phos  2.6     [03-28-19 @ 06:00]      PT/INR: PT 14.6 , INR 1.30       [03-28-19 @ 11:58]  PTT: 22.4       [03-28-19 @ 11:58]      Creatinine Trend:  SCr 1.55 [03-28 @ 06:00]  SCr 1.77 [03-27 @ 06:58]  SCr 1.77 [03-26 @ 06:40]  SCr 1.53 [03-25 @ 21:17]  SCr 1.61 [03-25 @ 04:20]        HbA1c 9.7      [03-27-19 @ 05:00]

## 2019-03-28 NOTE — PROGRESS NOTE ADULT - SUBJECTIVE AND OBJECTIVE BOX
Orthopaedic Surgery Progress Note    Subjective:   Patient seen and examined  No acute events overnight    Objective:  T(C): 36.4 (03-27-19 @ 17:52), Max: 36.4 (03-27-19 @ 17:52)  HR: 71 (03-27-19 @ 22:00) (70 - 80)  BP: 137/- (03-27-19 @ 17:52) (137/- - 137/-)  RR: 18 (03-27-19 @ 17:52) (18 - 18)  SpO2: 99% (03-27-19 @ 17:52) (96% - 99%)    03-26 @ 07:01  -  03-27 @ 07:00  --------------------------------------------------------  IN: 0 mL / OUT: 300 mL / NET: -300 mL    03-27 @ 07:01  -  03-28 @ 05:52  --------------------------------------------------------  IN: 0 mL / OUT: 1232 mL / NET: -1232 mL    PE  NAD  RLE LLE:   dressing C/D/I  motor intact grossly, patient non-compliant with detailed exam  WWP distally                        10.1   6.99  )-----------( 311      ( 27 Mar 2019 06:58 )             31.2     03-27    139  |  105  |  48<H>  ----------------------------<  201<H>  4.6   |  19<L>  |  1.77<H>    Ca    8.2<L>      27 Mar 2019 06:58  Phos  3.7     03-27  Mg     2.1     03-27    89y Male POD3 s/p L SUZE    - Pain control  - WBAT  - Posterior dislocation precautions  - PT/OT/OOB  - DVT ppx: coumadin once primary team agrees  - Dispo planning    Dylan Curry MD

## 2019-03-28 NOTE — PROGRESS NOTE ADULT - ASSESSMENT
88 yo [place]nian-speaking M with PMHx of DM2 (A1C 9.7%), hard of hearing, DVT in the past, skin cancer (s/p excision) presenting with AMS and respiratory distress following an unwitnessed fall. Found to have L femoral neck fracture, treated for possible sepsis likely 2/2 multifocal PNA, and acute Hypoxic and hypercarbic respiratory failure, improved after diuresis for acute diastolic CHF. Endocrine consulted for DM2 management.    1. DM2  While inpatient: BG above goal of 100-200 mg/dl  Increase Lantus to 12 units qhs  Add Humalog 2/2/2  Continue Low Humalog scale premeal. Add low bedtime scale.  Diabetes education to family members for insulin vial and syringe after discharge.  Will plan for NPH 1 vs. 2 times daily at [place] (patient is uninsured and with CKD so other options are limited.  Goal HbA1c around 8%.  Lifestyle modification    2. History of fracture/osteoporosis  Check 25OHD  Can add calcium supplement BID if able to swallow it.

## 2019-03-28 NOTE — PROGRESS NOTE ADULT - SUBJECTIVE AND OBJECTIVE BOX
Subjective: Patient seen and examined. No new events except as noted.     SUBJECTIVE/ROS:  feels ok       MEDICATIONS:  MEDICATIONS  (STANDING):  carvedilol 3.125 milliGRAM(s) Oral every 12 hours  dextrose 5%. 1000 milliLiter(s) (50 mL/Hr) IV Continuous <Continuous>  dextrose 50% Injectable 12.5 Gram(s) IV Push once  dextrose 50% Injectable 25 Gram(s) IV Push once  dextrose 50% Injectable 25 Gram(s) IV Push once  furosemide    Tablet 40 milliGRAM(s) Oral daily  heparin  Injectable 5000 Unit(s) SubCutaneous every 8 hours  insulin glargine Injectable (LANTUS) 10 Unit(s) SubCutaneous at bedtime  insulin lispro (HumaLOG) corrective regimen sliding scale   SubCutaneous three times a day before meals  sodium chloride 3%  Inhalation 4 milliLiter(s) Inhalation every 6 hours  tamsulosin 0.4 milliGRAM(s) Oral at bedtime  warfarin 2.5 milliGRAM(s) Oral once      PHYSICAL EXAM:  T(C): 36.4 (03-28-19 @ 14:04), Max: 36.8 (03-28-19 @ 06:02)  HR: 95 (03-28-19 @ 14:04) (70 - 95)  BP: 138/82 (03-28-19 @ 14:04) (137/- - 138/82)  RR: 18 (03-28-19 @ 14:04) (18 - 18)  SpO2: 99% (03-28-19 @ 14:04) (99% - 99%)  Wt(kg): --  I&O's Summary    27 Mar 2019 07:01  -  28 Mar 2019 07:00  --------------------------------------------------------  IN: 0 mL / OUT: 1832 mL / NET: -1832 mL    28 Mar 2019 07:01  -  28 Mar 2019 16:39  --------------------------------------------------------  IN: 0 mL / OUT: 600 mL / NET: -600 mL            JVP: Normal  Neck: supple  Lung: clear   CV: S1 S2 , Murmur:  Abd: soft  Ext: No edema  neuro: Awake   Psych: flat affect  Skin: normal``    LABS/DATA:    CARDIAC MARKERS:                                9.8    6.44  )-----------( 338      ( 28 Mar 2019 06:00 )             31.3     03-28    137  |  102  |  39<H>  ----------------------------<  238<H>  3.7   |  20<L>  |  1.55<H>    Ca    8.5      28 Mar 2019 06:00  Phos  2.6     03-28  Mg     2.0     03-28      proBNP:   Lipid Profile:   HgA1c:   TSH:     TELE:  EKG:

## 2019-03-28 NOTE — PROGRESS NOTE ADULT - SUBJECTIVE AND OBJECTIVE BOX
Chief Complaint: DM2    History: Tolerating po, on dysphagia diet. No hypoglycemia noted. Grandson at bedside.    MEDICATIONS  (STANDING):  carvedilol 3.125 milliGRAM(s) Oral every 12 hours  dextrose 5%. 1000 milliLiter(s) (50 mL/Hr) IV Continuous <Continuous>  dextrose 50% Injectable 12.5 Gram(s) IV Push once  dextrose 50% Injectable 25 Gram(s) IV Push once  dextrose 50% Injectable 25 Gram(s) IV Push once  furosemide    Tablet 40 milliGRAM(s) Oral daily  heparin  Injectable 5000 Unit(s) SubCutaneous every 8 hours  insulin glargine Injectable (LANTUS) 10 Unit(s) SubCutaneous at bedtime  insulin lispro (HumaLOG) corrective regimen sliding scale   SubCutaneous three times a day before meals  sodium chloride 3%  Inhalation 4 milliLiter(s) Inhalation every 6 hours  tamsulosin 0.4 milliGRAM(s) Oral at bedtime  warfarin 2.5 milliGRAM(s) Oral once    MEDICATIONS  (PRN):  acetaminophen   Tablet .. 650 milliGRAM(s) Oral every 6 hours PRN Mild Pain (1 - 3), Moderate Pain (4 - 6), Severe Pain (7 - 10)  acetaminophen   Tablet .. 650 milliGRAM(s) Oral every 6 hours PRN Temp greater or equal to 38C (100.4F)  acetaminophen  Suppository .. 650 milliGRAM(s) Rectal every 6 hours PRN Temp greater or equal to 38C (100.4F)  ALBUTerol/ipratropium for Nebulization 3 milliLiter(s) Nebulizer every 6 hours PRN Shortness of Breath and/or Wheezing  dextrose 40% Gel 15 Gram(s) Oral once PRN Blood Glucose LESS THAN 70 milliGRAM(s)/deciliter  glucagon  Injectable 1 milliGRAM(s) IntraMuscular once PRN Glucose LESS THAN 70 milligrams/deciliter  lidocaine 2% Gel 1 Application(s) Topical every 6 hours PRN discomfort/pain  QUEtiapine 25 milliGRAM(s) Oral every 6 hours PRN agitation      Allergies    No Known Allergies    Intolerances      Review of Systems:    UNABLE TO OBTAIN    PHYSICAL EXAM:  VITALS: T(C): 36.4 (03-28-19 @ 14:04)  T(F): 97.6 (03-28-19 @ 14:04), Max: 98.2 (03-28-19 @ 06:02)  HR: 95 (03-28-19 @ 14:04) (70 - 95)  BP: 138/82 (03-28-19 @ 14:04) (137/- - 138/82)  RR:  (18 - 18)  SpO2:  (99% - 99%)  Wt(kg): --  GENERAL: NAD, well-groomed, well-developed  EYES: No proptosis, no lid lag, anicteric  HEENT:  Atraumatic, Normocephalic, moist mucous membranes      CAPILLARY BLOOD GLUCOSE      POCT Blood Glucose.: 161 mg/dL (28 Mar 2019 16:44)  POCT Blood Glucose.: 241 mg/dL (28 Mar 2019 12:54)  POCT Blood Glucose.: 210 mg/dL (28 Mar 2019 11:35)  POCT Blood Glucose.: 243 mg/dL (28 Mar 2019 07:28)  POCT Blood Glucose.: 223 mg/dL (27 Mar 2019 22:11)      03-28    137  |  102  |  39<H>  ----------------------------<  238<H>  3.7   |  20<L>  |  1.55<H>    EGFR if : 45  EGFR if non : 39    Ca    8.5      03-28  Mg     2.0     03-28  Phos  2.6     03-28            Thyroid Function Tests:  03-14 @ 01:45 TSH 2.09 FreeT4 -- T3 -- Anti TPO -- Anti Thyroglobulin Ab -- TSI --      Hemoglobin A1C, Whole Blood: 9.7 % <H> [4.0 - 5.6] (03-27-19 @ 05:00)

## 2019-03-29 ENCOUNTER — TRANSCRIPTION ENCOUNTER (OUTPATIENT)
Age: 84
End: 2019-03-29

## 2019-03-29 VITALS
OXYGEN SATURATION: 98 % | HEART RATE: 85 BPM | TEMPERATURE: 99 F | RESPIRATION RATE: 18 BRPM | DIASTOLIC BLOOD PRESSURE: 64 MMHG | SYSTOLIC BLOOD PRESSURE: 127 MMHG

## 2019-03-29 LAB
GLUCOSE BLDC GLUCOMTR-MCNC: 164 MG/DL — HIGH (ref 70–99)
INR BLD: 2.01 — HIGH (ref 0.88–1.17)
PROTHROM AB SERPL-ACNC: 22.8 SEC — HIGH (ref 9.8–13.1)

## 2019-03-29 PROCEDURE — 99239 HOSP IP/OBS DSCHRG MGMT >30: CPT

## 2019-03-29 PROCEDURE — 99232 SBSQ HOSP IP/OBS MODERATE 35: CPT

## 2019-03-29 RX ORDER — INSULIN LISPRO 100/ML
1 VIAL (ML) SUBCUTANEOUS
Qty: 0 | Refills: 0 | Status: DISCONTINUED | OUTPATIENT
Start: 2019-03-29 | End: 2019-03-29

## 2019-03-29 RX ORDER — ENOXAPARIN SODIUM 100 MG/ML
30 INJECTION SUBCUTANEOUS
Qty: 30 | Refills: 0 | OUTPATIENT
Start: 2019-03-29 | End: 2019-04-21

## 2019-03-29 RX ORDER — FUROSEMIDE 40 MG
1 TABLET ORAL
Qty: 30 | Refills: 0 | OUTPATIENT
Start: 2019-03-29 | End: 2019-04-27

## 2019-03-29 RX ORDER — ACETAMINOPHEN 500 MG
2 TABLET ORAL
Qty: 0 | Refills: 0 | COMMUNITY
Start: 2019-03-29

## 2019-03-29 RX ORDER — DEXTROSE 50 % IN WATER 50 %
25 SYRINGE (ML) INTRAVENOUS ONCE
Qty: 0 | Refills: 0 | Status: DISCONTINUED | OUTPATIENT
Start: 2019-03-29 | End: 2019-03-29

## 2019-03-29 RX ORDER — ASPIRIN/CALCIUM CARB/MAGNESIUM 324 MG
1 TABLET ORAL
Qty: 0 | Refills: 0 | COMMUNITY

## 2019-03-29 RX ORDER — CARVEDILOL PHOSPHATE 80 MG/1
1 CAPSULE, EXTENDED RELEASE ORAL
Qty: 60 | Refills: 0 | OUTPATIENT
Start: 2019-03-29 | End: 2019-04-27

## 2019-03-29 RX ORDER — ISOPROPYL ALCOHOL, BENZOCAINE .7; .06 ML/ML; ML/ML
1 SWAB TOPICAL
Qty: 100 | Refills: 1 | OUTPATIENT
Start: 2019-03-29 | End: 2019-05-17

## 2019-03-29 RX ORDER — HUMAN INSULIN 100 [IU]/ML
8 INJECTION, SUSPENSION SUBCUTANEOUS
Qty: 3 | Refills: 0 | OUTPATIENT
Start: 2019-03-29 | End: 2019-04-27

## 2019-03-29 RX ORDER — METFORMIN HYDROCHLORIDE 850 MG/1
1 TABLET ORAL
Qty: 0 | Refills: 0 | COMMUNITY

## 2019-03-29 RX ORDER — TAMSULOSIN HYDROCHLORIDE 0.4 MG/1
1 CAPSULE ORAL
Qty: 30 | Refills: 0 | OUTPATIENT
Start: 2019-03-29 | End: 2019-04-27

## 2019-03-29 RX ORDER — ATORVASTATIN CALCIUM 80 MG/1
1 TABLET, FILM COATED ORAL
Qty: 0 | Refills: 0 | COMMUNITY

## 2019-03-29 RX ORDER — WARFARIN SODIUM 2.5 MG/1
1 TABLET ORAL
Qty: 30 | Refills: 0 | OUTPATIENT
Start: 2019-03-29 | End: 2019-04-27

## 2019-03-29 RX ORDER — WARFARIN SODIUM 2.5 MG/1
1 TABLET ORAL ONCE
Qty: 0 | Refills: 0 | Status: DISCONTINUED | OUTPATIENT
Start: 2019-03-29 | End: 2019-03-29

## 2019-03-29 RX ADMIN — Medication 2 UNIT(S): at 11:51

## 2019-03-29 RX ADMIN — HEPARIN SODIUM 5000 UNIT(S): 5000 INJECTION INTRAVENOUS; SUBCUTANEOUS at 07:42

## 2019-03-29 RX ADMIN — SODIUM CHLORIDE 4 MILLILITER(S): 9 INJECTION INTRAMUSCULAR; INTRAVENOUS; SUBCUTANEOUS at 10:30

## 2019-03-29 RX ADMIN — HEPARIN SODIUM 5000 UNIT(S): 5000 INJECTION INTRAVENOUS; SUBCUTANEOUS at 14:26

## 2019-03-29 RX ADMIN — Medication 2 UNIT(S): at 08:03

## 2019-03-29 RX ADMIN — CARVEDILOL PHOSPHATE 3.12 MILLIGRAM(S): 80 CAPSULE, EXTENDED RELEASE ORAL at 07:42

## 2019-03-29 RX ADMIN — Medication 1: at 08:03

## 2019-03-29 RX ADMIN — Medication 40 MILLIGRAM(S): at 07:42

## 2019-03-29 NOTE — PROGRESS NOTE ADULT - PROBLEM SELECTOR PLAN 1
s/p L hip hemiarthroplasty 3/25 - WBAT, PT/OT  SC hep for DVT ppx
Patient with acute kidney injury likely multifactorial in nature.  -contrast causing the creatinine to rise leading  to HAVEN from direct tubular damage   - may need Garcia back  -avoid nephrotoxic medications (dose vancomycin daily with trough levels),   -maintain adequate perfusion pressures, and renally dose medications. Nephrology will continue to follow.
-Plan for surgery on Monday   - tylenol for pain control
-Plan for surgery on Monday   - tylenol for pain control
-Plan for surgery today, NPO, pain control
s/p L hip hemiarthroplasty 3/25 - WBAT, PT/OT  - Per ortho, coumadin preferred for DVT ppx was dosed 2.5mg on 3/28. INR pending for 3/29. Will price check lovenox 30mg SC daily x28 days from surgery to see if family agreeable for this instead.  Will refer to clinic in [place] that sees visitors and uninsured.  The address is [facility] [place] [address].  Phone number is [phone]
s/p L hip hemiarthroplasty 3/25 - WBAT, PT/OT  - d/w ortho coumadin preferred for DVT ppx - will start today 2.5mg, need to clarify outpt f/u
s/p L hip hemiarthroplasty 3/25 - WBAT, PT/OT  d/w ortho  bid + SC hep for DVT ppx  pt is a visitor to [place], team looking into any possible [facility] but unlikely

## 2019-03-29 NOTE — PROGRESS NOTE ADULT - SUBJECTIVE AND OBJECTIVE BOX
Patient is a 89y old  Male who presents with a chief complaint of fall (29 Mar 2019 06:44)      SUBJECTIVE / OVERNIGHT EVENTS: No acute events overnight. ROS unattainable due to patient refusal to answer and difficulty with hearing.    MEDICATIONS  (STANDING):  carvedilol 3.125 milliGRAM(s) Oral every 12 hours  dextrose 5%. 1000 milliLiter(s) (50 mL/Hr) IV Continuous <Continuous>  dextrose 50% Injectable 12.5 Gram(s) IV Push once  dextrose 50% Injectable 25 Gram(s) IV Push once  dextrose 50% Injectable 25 Gram(s) IV Push once  furosemide    Tablet 40 milliGRAM(s) Oral daily  heparin  Injectable 5000 Unit(s) SubCutaneous every 8 hours  insulin glargine Injectable (LANTUS) 12 Unit(s) SubCutaneous at bedtime  insulin lispro (HumaLOG) corrective regimen sliding scale   SubCutaneous at bedtime  insulin lispro (HumaLOG) corrective regimen sliding scale   SubCutaneous three times a day before meals  insulin lispro Injectable (HumaLOG) 2 Unit(s) SubCutaneous three times a day before meals  sodium chloride 3%  Inhalation 4 milliLiter(s) Inhalation every 6 hours  tamsulosin 0.4 milliGRAM(s) Oral at bedtime    MEDICATIONS  (PRN):  acetaminophen   Tablet .. 650 milliGRAM(s) Oral every 6 hours PRN Mild Pain (1 - 3), Moderate Pain (4 - 6), Severe Pain (7 - 10)  acetaminophen   Tablet .. 650 milliGRAM(s) Oral every 6 hours PRN Temp greater or equal to 38C (100.4F)  acetaminophen  Suppository .. 650 milliGRAM(s) Rectal every 6 hours PRN Temp greater or equal to 38C (100.4F)  ALBUTerol/ipratropium for Nebulization 3 milliLiter(s) Nebulizer every 6 hours PRN Shortness of Breath and/or Wheezing  dextrose 40% Gel 15 Gram(s) Oral once PRN Blood Glucose LESS THAN 70 milliGRAM(s)/deciliter  glucagon  Injectable 1 milliGRAM(s) IntraMuscular once PRN Glucose LESS THAN 70 milligrams/deciliter  lidocaine 2% Gel 1 Application(s) Topical every 6 hours PRN discomfort/pain  QUEtiapine 25 milliGRAM(s) Oral every 6 hours PRN agitation      T(C): 37.3 (03-29-19 @ 05:46), Max: 37.3 (03-29-19 @ 05:46)  HR: 87 (03-29-19 @ 05:46) (73 - 95)  BP: 133/67 (03-29-19 @ 05:46) (133/67 - 153/80)  RR: 18 (03-29-19 @ 05:46) (18 - 18)  SpO2: 99% (03-29-19 @ 05:46) (99% - 99%)  CAPILLARY BLOOD GLUCOSE      POCT Blood Glucose.: 97 mg/dL (29 Mar 2019 11:39)  POCT Blood Glucose.: 164 mg/dL (29 Mar 2019 07:46)  POCT Blood Glucose.: 296 mg/dL (28 Mar 2019 22:26)  POCT Blood Glucose.: 161 mg/dL (28 Mar 2019 16:44)  POCT Blood Glucose.: 241 mg/dL (28 Mar 2019 12:54)    I&O's Summary    28 Mar 2019 07:01  -  29 Mar 2019 07:00  --------------------------------------------------------  IN: 0 mL / OUT: 1100 mL / NET: -1100 mL      PHYSICAL EXAM:  GENERAL: elderly man in bed, on RA in  NAD, [place]  EYES:  sclera clear  CHEST/LUNG: Clear to auscultation bilaterally; No wheeze  HEART: Regular rate and rhythm; No murmurs, rubs, or gallops  ABDOMEN: Soft, Nontender, Nondistended; Bowel sounds present  EXTREMITIES:  2+ Peripheral Pulses, No clubbing, cyanosis, or edema  PSYCH: awake, alert  SKIN: No rashes or lesions  : +indwelling concepcion with clear yellow urine  MSK: L lateral hip dressing intact    LABS:                        9.8    6.44  )-----------( 338      ( 28 Mar 2019 06:00 )             31.3     03-28    137  |  102  |  39<H>  ----------------------------<  238<H>  3.7   |  20<L>  |  1.55<H>    Ca    8.5      28 Mar 2019 06:00  Phos  2.6     03-28  Mg     2.0     03-28      PT/INR - ( 28 Mar 2019 11:58 )   PT: 14.6 SEC;   INR: 1.30          PTT - ( 28 Mar 2019 11:58 )  PTT:22.4 SEC          RADIOLOGY & ADDITIONAL TESTS:

## 2019-03-29 NOTE — PROGRESS NOTE ADULT - PROBLEM SELECTOR PROBLEM 7
Dementia
Acute on chronic heart failure with preserved ejection fraction
Acute on chronic heart failure with preserved ejection fraction
Dementia
Hyperlipidemia

## 2019-03-29 NOTE — PROGRESS NOTE ADULT - PROBLEM SELECTOR PLAN 7
stable   -c/w Seroquel
-Now euvolemic. will cont.  Lasix to 40mg daily  -Echo showed moderate diastolic dysfunction and severe pulm HTN   -Coreg as per Cardiology rec.  -Pulmonary consult appreciated  -Cardiology consult appreciated.
-Now euvolemic. will cont.  Lasix to 40mg daily  -Echo showed moderate diastolic dysfunction and severe pulm HTN   -Coreg as per Cardiology rec.  -Pulmonary consult appreciated  -Cardiology consult appreciated.
c/w statin
stable   -c/w Seroquel

## 2019-03-29 NOTE — PROGRESS NOTE ADULT - SUBJECTIVE AND OBJECTIVE BOX
Orthopaedic Surgery Progress Note    Subjective:   Patient seen and examined  No acute events overnight    Vital Signs Last 24 Hrs  T(C): 37.3 (29 Mar 2019 05:46), Max: 37.3 (29 Mar 2019 05:46)  T(F): 99.2 (29 Mar 2019 05:46), Max: 99.2 (29 Mar 2019 05:46)  HR: 87 (29 Mar 2019 05:46) (70 - 95)  BP: 133/67 (29 Mar 2019 05:46) (133/67 - 153/80)  BP(mean): --  RR: 18 (29 Mar 2019 05:46) (18 - 18)  SpO2: 99% (29 Mar 2019 05:46) (99% - 99%)    I&O's Detail    27 Mar 2019 07:01  -  28 Mar 2019 07:00  --------------------------------------------------------  IN:  Total IN: 0 mL    OUT:    Intermittent Catheterization - Urethral: 1832 mL  Total OUT: 1832 mL    Total NET: -1832 mL      28 Mar 2019 07:01  -  29 Mar 2019 06:45  --------------------------------------------------------  IN:  Total IN: 0 mL    OUT:    Indwelling Catheter - Urethral: 1100 mL  Total OUT: 1100 mL    Total NET: -1100 mL          PE  NAD  RLE LLE:   wound c/d/i staples intact, no drainage or erythema  Aquacel placed on wound   motor intact grossly, patient non-compliant with detailed exam  WWP distally                                            9.8    6.44  )-----------( 338      ( 28 Mar 2019 06:00 )             31.3     03-28    137  |  102  |  39<H>  ----------------------------<  238<H>  3.7   |  20<L>  |  1.55<H>    Ca    8.5      28 Mar 2019 06:00  Phos  2.6     03-28  Mg     2.0     03-28        89y Male POD4 s/p L estrella    - Pain control  - WBAT  - Posterior dislocation precautions  - PT/OT/OOB  - DVT ppx: coumadin once primary team agrees  - Dispo planning

## 2019-03-29 NOTE — PROGRESS NOTE ADULT - PROBLEM SELECTOR PLAN 5
[place] 2/2 volume overload. LEss likely PNA given resolution of opacities.  - respiratory distress, coarse rhonchi, CT showing ? multifocal PNA or pulm edema  - completed  vanc by level / zosyn X 7 days  - Chest PT as pt can tolerate  - congestion on CXR, s/p lasix 40 mg IV X 5 - now euvolemic
Renal function improving. Jessicay new baseline  - making urine, strict I&Os, renally dose meds   -Renal input appreciated.
Renal function improving. Likely new baseline, Cr now stable
[facility]
stable   -c/w Seroquel
stable   -c/w Seroquel
stable   c/w ISS and FS QID

## 2019-03-29 NOTE — PROGRESS NOTE ADULT - REASON FOR ADMISSION
fall

## 2019-03-29 NOTE — PROGRESS NOTE ADULT - SUBJECTIVE AND OBJECTIVE BOX
Subjective: Patient seen and examined. No new events except as noted.     SUBJECTIVE/ROS:  resting   NAD      MEDICATIONS:  MEDICATIONS  (STANDING):  carvedilol 3.125 milliGRAM(s) Oral every 12 hours  dextrose 5%. 1000 milliLiter(s) (50 mL/Hr) IV Continuous <Continuous>  dextrose 50% Injectable 12.5 Gram(s) IV Push once  dextrose 50% Injectable 25 Gram(s) IV Push once  dextrose 50% Injectable 25 Gram(s) IV Push once  furosemide    Tablet 40 milliGRAM(s) Oral daily  heparin  Injectable 5000 Unit(s) SubCutaneous every 8 hours  insulin glargine Injectable (LANTUS) 12 Unit(s) SubCutaneous at bedtime  insulin lispro (HumaLOG) corrective regimen sliding scale   SubCutaneous at bedtime  insulin lispro (HumaLOG) corrective regimen sliding scale   SubCutaneous three times a day before meals  insulin lispro Injectable (HumaLOG) 2 Unit(s) SubCutaneous three times a day before meals  sodium chloride 3%  Inhalation 4 milliLiter(s) Inhalation every 6 hours  tamsulosin 0.4 milliGRAM(s) Oral at bedtime  warfarin 1 milliGRAM(s) Oral once      PHYSICAL EXAM:  T(C): 37.3 (03-29-19 @ 13:16), Max: 37.3 (03-29-19 @ 05:46)  HR: 85 (03-29-19 @ 13:16) (73 - 90)  BP: 127/64 (03-29-19 @ 13:16) (127/64 - 153/80)  RR: 18 (03-29-19 @ 13:16) (18 - 18)  SpO2: 98% (03-29-19 @ 13:16) (96% - 99%)  Wt(kg): --  I&O's Summary    28 Mar 2019 07:01  -  29 Mar 2019 07:00  --------------------------------------------------------  IN: 0 mL / OUT: 1100 mL / NET: -1100 mL            JVP: Normal  Neck: supple  Lung: clear   CV: S1 S2 , Murmur:  Abd: soft  Ext: No edema  neuro: Awake / alert  Psych: flat affect  Skin: normal``    LABS/DATA:    CARDIAC MARKERS:                                9.8    6.44  )-----------( 338      ( 28 Mar 2019 06:00 )             31.3     03-28    137  |  102  |  39<H>  ----------------------------<  238<H>  3.7   |  20<L>  |  1.55<H>    Ca    8.5      28 Mar 2019 06:00  Phos  2.6     03-28  Mg     2.0     03-28      proBNP:   Lipid Profile:   HgA1c:   TSH:     TELE:  EKG:

## 2019-03-29 NOTE — PROGRESS NOTE ADULT - PROBLEM SELECTOR PROBLEM 8
Hyperlipidemia
Need for prophylactic measure

## 2019-03-29 NOTE — PROGRESS NOTE ADULT - ATTENDING COMMENTS
I agree with the above note and have personally seen and examined this patient. All pertinent films have been reviewed. Please refer to clinical documentation of the history, physical examinations, data summary, and both assessment and plan as documented above and with which I agree.    continued delerium  concepcion replaced for failed tov  patient self dc aquacel, new one placed  unable to progress with pt  dvt ppx needed (renal dosed lovenox or xarelto) as coumdin will be difficult for the patient to f/u with a lab to dose this. asa unlikely to be sufficient for dvt chemoppx without venodynes  wbat, posterior hip precautions  staples dc in 2 weeks    Abdiel Shabazz MD  Attending Orthopedic Surgeon

## 2019-03-29 NOTE — PROGRESS NOTE ADULT - PROBLEM SELECTOR PROBLEM 5
Acute respiratory failure with hypoxia
Dementia
Dementia
HAVEN (acute kidney injury)
Hyperglycemia

## 2019-03-29 NOTE — PROGRESS NOTE ADULT - PROBLEM SELECTOR PROBLEM 6
Hyperglycemia
Acute respiratory failure with hypoxia
Dementia
Hyperlipidemia
Hyperlipidemia

## 2019-03-29 NOTE — PROGRESS NOTE ADULT - PROBLEM SELECTOR PROBLEM 4
HAVEN (acute kidney injury)
Acute respiratory failure with hypoxia
Hyperglycemia
Hyperglycemia
Type 2 diabetes mellitus with hyperglycemia, without long-term current use of insulin

## 2019-03-29 NOTE — PROGRESS NOTE ADULT - PROBLEM SELECTOR PLAN 4
Renal function improving. Jessicay new baseline  - making urine, strict I&Os, renally dose meds   -Renal input appreciated.
HbA1c 9.7 - was on lantus earlier in admission, had some hypoglycemic episodes, - - appreciate endo input  - family being taught insulin injections, may privately pay for insulin
HbA1c 9.7 - was on lantus earlier in admission, had some hypoglycemic episodes, - - appreciate endo input, family being taught insulin injections, may privately pay for insulin
HbA1c 9.7 - was on lantus earlier in admission, had some hypoglycemic episodes, f/u endo input  long term Rx - [place] a visitor to [place], no insurance
[place] 2/2 volume overload. LEss likely PNA given resolution of opacities.  - respiratory distress, coarse rhonchi, CT showing ? multifocal PNA or pulm edema  - completed  vanc by level / zosyn X 7 days  - Chest PT as pt can tolerate  - congestion on CXR, s/p lasix 40 mg IV X 5 - now euvolemic
stable   c/w ISS and FS QID
stable   c/w ISS and FS QID

## 2019-03-29 NOTE — PROGRESS NOTE ADULT - PROVIDER SPECIALTY LIST ADULT
Anesthesia
Cardiology
Endocrinology
Hospitalist
Internal Medicine
Nephrology
Orthopedics
Pulmonology
Orthopedics
Endocrinology
Nephrology
Hospitalist

## 2019-03-29 NOTE — DISCHARGE NOTE NURSING/CASE MANAGEMENT/SOCIAL WORK - NSSCNAMETXT_GEN_ALL_CORE
[facility] [phone], [phone].  Nurse to visit on the day after discharge.  Other appropriate services to be arranged thereafter.

## 2019-03-29 NOTE — PROGRESS NOTE ADULT - PROBLEM SELECTOR PROBLEM 1
Type 2 diabetes mellitus with hyperglycemia, without long-term current use of insulin
HAVEN (acute kidney injury)
Contrast dye induced nephropathy
Contrast dye induced nephropathy
Fracture of femoral neck, left
Type 2 diabetes mellitus with hyperglycemia, without long-term current use of insulin
Fracture of femoral neck, left

## 2019-03-29 NOTE — PROGRESS NOTE ADULT - SUBJECTIVE AND OBJECTIVE BOX
Chief Complaint: DM2    History: Tolerating po. No hypoglycemia.    MEDICATIONS  (STANDING):  carvedilol 3.125 milliGRAM(s) Oral every 12 hours  dextrose 5%. 1000 milliLiter(s) (50 mL/Hr) IV Continuous <Continuous>  dextrose 50% Injectable 12.5 Gram(s) IV Push once  dextrose 50% Injectable 25 Gram(s) IV Push once  dextrose 50% Injectable 25 Gram(s) IV Push once  furosemide    Tablet 40 milliGRAM(s) Oral daily  heparin  Injectable 5000 Unit(s) SubCutaneous every 8 hours  insulin glargine Injectable (LANTUS) 12 Unit(s) SubCutaneous at bedtime  insulin lispro (HumaLOG) corrective regimen sliding scale   SubCutaneous at bedtime  insulin lispro (HumaLOG) corrective regimen sliding scale   SubCutaneous three times a day before meals  insulin lispro Injectable (HumaLOG) 1 Unit(s) SubCutaneous three times a day before meals  tamsulosin 0.4 milliGRAM(s) Oral at bedtime  warfarin 1 milliGRAM(s) Oral once    MEDICATIONS  (PRN):  acetaminophen   Tablet .. 650 milliGRAM(s) Oral every 6 hours PRN Mild Pain (1 - 3), Moderate Pain (4 - 6), Severe Pain (7 - 10)  acetaminophen   Tablet .. 650 milliGRAM(s) Oral every 6 hours PRN Temp greater or equal to 38C (100.4F)  acetaminophen  Suppository .. 650 milliGRAM(s) Rectal every 6 hours PRN Temp greater or equal to 38C (100.4F)  ALBUTerol/ipratropium for Nebulization 3 milliLiter(s) Nebulizer every 6 hours PRN Shortness of Breath and/or Wheezing  dextrose 40% Gel 15 Gram(s) Oral once PRN Blood Glucose LESS THAN 70 milliGRAM(s)/deciliter  glucagon  Injectable 1 milliGRAM(s) IntraMuscular once PRN Glucose LESS THAN 70 milligrams/deciliter  lidocaine 2% Gel 1 Application(s) Topical every 6 hours PRN discomfort/pain  QUEtiapine 25 milliGRAM(s) Oral every 6 hours PRN agitation      Allergies    No Known Allergies    Intolerances      Review of Systems:    UNABLE TO OBTAIN    PHYSICAL EXAM:  VITALS: T(C): 37.3 (03-29-19 @ 13:16)  T(F): 99.2 (03-29-19 @ 13:16), Max: 99.2 (03-29-19 @ 05:46)  HR: 85 (03-29-19 @ 13:16) (75 - 90)  BP: 127/64 (03-29-19 @ 13:16) (127/64 - 153/80)  RR:  (18 - 18)  SpO2:  (96% - 99%)  Wt(kg): --  GENERAL: NAD, comfortable  EYES: No proptosis, no lid lag, anicteric  HEENT:  Atraumatic, Normocephalic, moist mucous membranes      CAPILLARY BLOOD GLUCOSE      POCT Blood Glucose.: 97 mg/dL (29 Mar 2019 11:39)  POCT Blood Glucose.: 164 mg/dL (29 Mar 2019 07:46)  POCT Blood Glucose.: 296 mg/dL (28 Mar 2019 22:26)      03-28    137  |  102  |  39<H>  ----------------------------<  238<H>  3.7   |  20<L>  |  1.55<H>    EGFR if : 45  EGFR if non : 39    Ca    8.5      03-28  Mg     2.0     03-28  Phos  2.6     03-28            Thyroid Function Tests:  03-14 @ 01:45 TSH 2.09 FreeT4 -- T3 -- Anti TPO -- Anti Thyroglobulin Ab -- TSI --      Hemoglobin A1C, Whole Blood: 9.7 % <H> [4.0 - 5.6] (03-27-19 @ 05:00)

## 2019-03-29 NOTE — PROGRESS NOTE ADULT - ATTENDING COMMENTS
Per Dr. Leahy's discussion with family: pt's grandson Dewayne and son Moe aware of planned discharge home today 3/29.  Family reports there will always be a family member with the patient 24/7.   Family trying to make f/u appt at [facility].  [facility] accepted patient.    45 min spent coordinating discharge

## 2019-03-29 NOTE — PROGRESS NOTE ADULT - PROBLEM SELECTOR PLAN 6
stable   c/w ISS and FS QID
Liekly 2/2 volume overload. LEss likely PNA given resolution of opacities.  - respiratory distress, coarse rhonchi, CT showing ? multifocal PNA or pulm edema  - completed  vanc by level / zosyn X 7 days  - Chest PT as pt can tolerate  - congestion on CXR, s/p lasix 40 mg IV X 5 - now euvolemic
[place] 2/2 volume overload. LEss likely PNA given resolution of opacities.  - respiratory distress, coarse rhonchi, CT showing ? multifocal PNA or pulm edema  - completed  vanc by level / zosyn X 7 days, Chest PT as pt can tolerate  now euvolemic
c/w statin
c/w statin
resolved  Likely 2/2 volume overload. LEss likely PNA given resolution of opacities.  - respiratory distress, coarse rhonchi, CT showing ? multifocal PNA or pulm edema  - completed  vanc by level / zosyn X 7 days, Chest PT as pt can tolerate  now euvolemic
stable   -c/w Seroquel

## 2019-03-29 NOTE — DISCHARGE NOTE NURSING/CASE MANAGEMENT/SOCIAL WORK - NSDCDPATPORTLINK_GEN_ALL_CORE
You can access the [facility] Patient Portal, offered by [facility], by registering with the following website: [email or web address]

## 2019-03-29 NOTE — PROGRESS NOTE ADULT - ASSESSMENT
89 yo Bosnian-speaking M with PMHx of DM2 (A1C 9.7%), hard of hearing, DVT in the past, skin cancer (s/p excision) presenting with AMS and respiratory distress following an unwitnessed fall. Found to have L femoral neck fracture, treated for possible sepsis likely 2/2 multifocal PNA, and acute Hypoxic and hypercarbic respiratory failure, improved after diuresis for acute diastolic CHF. Endocrine consulted for DM2 management.    1. DM2  While inpatient: BG borderline low to 97.  Continue Lantus 12 units qhs  Decrease Humalog to 1/1/1  Continue Low Humalog scale premeal. Add low bedtime scale.  Diabetes education to family members for insulin vial and syringe after discharge.  Discharge plan:  NPH (Novolin-N or Humulin-N) 8 units qAM (vial + syringes)  Patient is uninsured and with CKD so other options are limited.  Goal HbA1c around 8%.  Lifestyle modification    2. History of fracture/osteoporosis  Recommend vitamin D 1,000 units daily and calcium supplement BID   Can check 25OHD as outpatient 89 yo Bosnian-speaking M with PMHx of DM2 (A1C 9.7%), hard of hearing, DVT in the past, skin cancer (s/p excision) presenting with AMS and respiratory distress following an unwitnessed fall. Found to have L femoral neck fracture, treated for possible sepsis likely 2/2 multifocal PNA, and acute Hypoxic and hypercarbic respiratory failure, improved after diuresis for acute diastolic CHF. Endocrine consulted for DM2 management.    1. DM2  While inpatient: BG borderline low to 97.  Continue Lantus 12 units qhs  Decrease Humalog to 1/1/1  Continue Low Humalog scale premeal. Add low bedtime scale.  Diabetes education to family members for insulin vial and syringe after discharge.  Discharge plan:  NPH (Novolin-N or Humulin-N) 8 units qAM (vial + syringes)  Patient is uninsured and with CKD so other options are limited.  Goal HbA1c around 8%.  Lifestyle modification  Follow up [facility] endocrine clinic [phone].    2. History of fracture/osteoporosis  Recommend vitamin D 1,000 units daily and calcium supplement BID   Can check 25OHD as outpatient

## 2019-03-29 NOTE — PROGRESS NOTE ADULT - PROBLEM SELECTOR PLAN 3
-Echo showed moderate diastolic dysfunction and severe pulm HTN   -Coreg as per Cardiology rec.  -Now euvolemic c/w Lasix 40mg PO daily
-Now euvolemic  -Echo showed moderate diastolic dysfunction and severe pulm HTN   -Coreg as per Cardiology rec, c/w Lasix 40mg PO daily
Liekly 2/2 volume overload. LEss likely PNA given resolution of opacities.  - respiratory distress, coarse rhonchi, CT showing ? multifocal PNA or pulm edema  - completed  vanc by level / zosyn X 7 days  - Chest PT as pt can tolerate  - congestion on CXR, s/p lasix 40 mg IV X 5,
Liekly 2/2 volume overload. LEss likely PNA given resolution of opacities.  - respiratory distress, coarse rhonchi, CT showing ? multifocal PNA or pulm edema  - completed  vanc by level / zosyn X 7 days  - Chest PT as pt can tolerate  - congestion on CXR, s/p lasix 40 mg IV X 5,
Renal function improving. Jessicay new baseline  - making urine, strict I&Os, renally dose meds   -Renal input appreciated.

## 2019-03-29 NOTE — PROGRESS NOTE ADULT - ASSESSMENT
acute diastolic chf  pulm HTN  stable  cont current meds  cont diuretics     HAVEN  stable   crt stable     DM  Monitor finger stick. Insulin coverage.    check labs

## 2019-03-29 NOTE — PROGRESS NOTE ADULT - PROBLEM SELECTOR PROBLEM 2
Age-related osteoporosis with current pathological fracture with routine healing, subsequent encounter
Urinary retention
Hypernatremia
Age-related osteoporosis with current pathological fracture with routine healing, subsequent encounter
Acute on chronic heart failure with preserved ejection fraction
HAVEN (acute kidney injury)
HAVEN (acute kidney injury)
Urinary retention

## 2019-03-29 NOTE — PROGRESS NOTE ADULT - PROBLEM SELECTOR PROBLEM 3
Acute on chronic heart failure with preserved ejection fraction
Acute respiratory failure with hypoxia
Acute respiratory failure with hypoxia
HAVEN (acute kidney injury)

## 2019-03-29 NOTE — PROGRESS NOTE ADULT - NSHPATTENDINGPLANDISCUSS_GEN_ALL_CORE
Dr. Rangel
Juanis, susan Siddiqui, NP
NP
son at the bedside
Family and House Staff
NP
team
Family and House Staff
Family and House Staff
patient
NP
NP
ADS

## 2019-04-04 LAB — SURGICAL PATHOLOGY STUDY: SIGNIFICANT CHANGE UP

## 2019-04-17 ENCOUNTER — CLINICAL ADVICE (OUTPATIENT)
Age: 84
End: 2019-04-17

## 2019-04-23 ENCOUNTER — CLINICAL ADVICE (OUTPATIENT)
Age: 84
End: 2019-04-23
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
## 2021-01-06 NOTE — PROGRESS NOTE ADULT - SUBJECTIVE AND OBJECTIVE BOX
Patient is a 88y old  Male who presents with a chief complaint of fall (18 Mar 2019 06:30)      SUBJECTIVE / OVERNIGHT EVENTS: pt seen and examined. No complaints. MS improving, still on BiPAP at night.     MEDICATIONS  (STANDING):  ALBUTerol/ipratropium for Nebulization 3 milliLiter(s) Nebulizer every 6 hours  dextrose 5%. 1000 milliLiter(s) (50 mL/Hr) IV Continuous <Continuous>  dextrose 50% Injectable 12.5 Gram(s) IV Push once  dextrose 50% Injectable 25 Gram(s) IV Push once  dextrose 50% Injectable 25 Gram(s) IV Push once  insulin glargine Injectable (LANTUS) 15 Unit(s) SubCutaneous at bedtime  insulin lispro (HumaLOG) corrective regimen sliding scale   SubCutaneous three times a day before meals  insulin lispro (HumaLOG) corrective regimen sliding scale   SubCutaneous at bedtime  insulin lispro Injectable (HumaLOG) 4 Unit(s) SubCutaneous three times a day before meals  piperacillin/tazobactam IVPB. 3.375 Gram(s) IV Intermittent every 12 hours  sodium chloride 3%  Inhalation 4 milliLiter(s) Inhalation every 6 hours    MEDICATIONS  (PRN):  acetaminophen   Tablet .. 650 milliGRAM(s) Oral every 6 hours PRN Mild Pain (1 - 3), Moderate Pain (4 - 6), Severe Pain (7 - 10)  acetaminophen   Tablet .. 650 milliGRAM(s) Oral every 6 hours PRN Temp greater or equal to 38C (100.4F)  acetaminophen  Suppository .. 650 milliGRAM(s) Rectal every 6 hours PRN Temp greater or equal to 38C (100.4F)  dextrose 40% Gel 15 Gram(s) Oral once PRN Blood Glucose LESS THAN 70 milliGRAM(s)/deciliter  glucagon  Injectable 1 milliGRAM(s) IntraMuscular once PRN Glucose LESS THAN 70 milligrams/deciliter  QUEtiapine 25 milliGRAM(s) Oral every 6 hours PRN agitation      Vital Signs Last 24 Hrs  T(C): 36.4 (18 Mar 2019 09:38), Max: 37.1 (18 Mar 2019 05:58)  T(F): 97.5 (18 Mar 2019 09:38), Max: 98.7 (18 Mar 2019 05:58)  HR: 82 (18 Mar 2019 10:14) (73 - 101)  BP: 148/88 (18 Mar 2019 09:38) (118/74 - 148/88)  BP(mean): --  RR: 18 (18 Mar 2019 09:38) (17 - 19)  SpO2: 94% (18 Mar 2019 10:14) (94% - 100%)  CAPILLARY BLOOD GLUCOSE      POCT Blood Glucose.: 125 mg/dL (18 Mar 2019 07:32)  POCT Blood Glucose.: 134 mg/dL (17 Mar 2019 21:57)  POCT Blood Glucose.: 129 mg/dL (17 Mar 2019 16:43)  POCT Blood Glucose.: 175 mg/dL (17 Mar 2019 11:35)    I&O's Summary    17 Mar 2019 07:01  -  18 Mar 2019 07:00  --------------------------------------------------------  IN: 0 mL / OUT: 2500 mL / NET: -2500 mL    18 Mar 2019 07:01  -  18 Mar 2019 10:16  --------------------------------------------------------  IN: 0 mL / OUT: 1140 mL / NET: -1140 mL        PHYSICAL EXAM:  GENERAL: NAD, well-developed  HEAD:  Atraumatic, Normocephalic  EYES: EOMI, PERRLA, conjunctiva and sclera clear  NECK: Supple, No JVD  CHEST/LUNG: (+) wheezes diffusely b/l  HEART: Regular rate and rhythm; No murmurs, rubs, or gallops  ABDOMEN: Soft, Nontender, Nondistended; Bowel sounds present  EXTREMITIES:  2+ Peripheral Pulses, No clubbing, cyanosis, or edema  PSYCH: Awake and alert.   NEUROLOGY: non-focal  SKIN: No rashes or lesions    LABS:                        12.8   5.67  )-----------( x        ( 18 Mar 2019 09:00 )             40.9     03-17    140  |  106  |  51<H>  ----------------------------<  191<H>  4.3   |  18<L>  |  2.38<H>    Ca    8.2<L>      17 Mar 2019 05:30  Mg     2.4     03-17      PT/INR - ( 18 Mar 2019 09:00 )   PT: 12.2 SEC;   INR: 1.10          PTT - ( 18 Mar 2019 09:00 )  PTT:28.4 SEC  CARDIAC MARKERS ( 17 Mar 2019 05:30 )  x     / x     / 2088 u/L / x     / x          Urinalysis Basic - ( 16 Mar 2019 12:15 )    Color: YELLOW / Appearance: TURBID / S.020 / pH: 6.0  Gluc: NEGATIVE / Ketone: NEGATIVE  / Bili: NEGATIVE / Urobili: NORMAL   Blood: LARGE / Protein: 100 / Nitrite: NEGATIVE   Leuk Esterase: TRACE / RBC: >50 / WBC 5-10   Sq Epi: x / Non Sq Epi: OCC / Bacteria: x        RADIOLOGY & ADDITIONAL TESTS:    Imaging Personally Reviewed:  < from: Xray Chest 1 View- PORTABLE-Urgent (19 @ 12:15) >  IMPRESSION:   Persistent pulmonary edema or bilateral multifocal pneumonia.  Small bilateral pleural effusions.    < end of copied text >      Consultant(s) Notes Reviewed:  Ortho    Care Discussed with Consultants/Other Providers: Suturegard Intro: Intraoperative tissue expansion was performed, utilizing the SUTUREGARD device, in order to reduce wound tension.

## 2021-09-09 NOTE — DISCHARGE NOTE PROVIDER - NSDCCPCAREPLAN_GEN_ALL_CORE_FT
PRINCIPAL DISCHARGE DIAGNOSIS  Diagnosis: Respiratory failure  Assessment and Plan of Treatment: Likley secondary to active infection and fluid overload. You were given Lasix and antibiotics with improvement of respiratory status      SECONDARY DISCHARGE DIAGNOSES  Diagnosis: Multifocal pneumonia  Assessment and Plan of Treatment: A course of antibiotics was initiated during your hospital stay. Monitor for worsening of disease, such as, increased cough/sputum, difficulty breathing, fever/chills, or changes in mental status. Follow-up with your PCP as an outpatient for further medical care/recommendations. PRINCIPAL DISCHARGE DIAGNOSIS  Diagnosis: Respiratory failure  Assessment and Plan of Treatment: Likley secondary to active infection and fluid overload. You were given Lasix and antibiotics with improvement of respiratory status. Continue to take your Lasix as prescribed. Monitor your weight and blood pressure. Perform deep breathing and coughing exercises. Maintain head of bed elevated when in bed or eating.      SECONDARY DISCHARGE DIAGNOSES  Diagnosis: Age-related osteoporosis with current pathological fracture with routine healing, subsequent encounter  Assessment and Plan of Treatment: You had a left hip fracture, surgery performed by Orthopedics team on 3/25/19. Take Tylenol orally 650mg every 6 hours as needed for pain. Maintain weight bearing precautions to left leg. Patient may ambulate with rolling walker, asisstance needed at all times. Home care services with short-term physical therapy at home. Follow up with the Orthopedics DrDony Shabazz in 2 weeks from surgery, make an appt for week of 4/8/19. Monitor surgical site for any signs of excess drainage, swelling, severe redness.    Diagnosis: Fracture of femoral neck, left  Assessment and Plan of Treatment: Status post left hip surgery, follow up with orthopedics in 2 weeks from surgery date, week of 4/8/19. Monitor surgical site for signs of infection such as excess drainage, swelling or redness; or Temp. above 101.5 F. Take you Coumadin 1mg once daily in the evening, monitor for any signs of bleeding or bruising. Follow up at the [facility] for monitoring of coumadin/INR bloodwork in within 1 week.    Diagnosis: Hypertension  Assessment and Plan of Treatment: Continue to take your Lasix and Coreg. Monitor for signs of chest pain, shortness of breath, palpitations, vision changes, headaches, dizziness. Check you blood pressure once daily, follow up at the [facility] per routine for further management    Diagnosis: Type 2 diabetes mellitus with hyperglycemia, without long-term current use of insulin  Assessment and Plan of Treatment: Check you blood sugar every morning before breakfast. Take your Insulin NPH 8units every morning before breakfast. Monitor for signs of dizziness, headaches, severe sweating, tremors/chills, blurry vision. Follow up outpatient with the [facility] in 2-3 weeks    Diagnosis: HAVEN (acute kidney injury)  Assessment and Plan of Treatment: Continue Concepcion catheter at this time, take Flomax as directed. Kidney function trending down, stable. Continue a diet that is low in sodium and avoid foods that are concentrated in potassium and phosphorus. Avoid over-the-counter drugs that are harmful to kidneys, such as, Non-Steroidal Anti-Inflammatory Drugs (NSAIDs-Motrin, Advil). Follow-up as outpatient to monitor your kidney function with PCP or [facility] in 1-2 weeks.    Diagnosis: Urinary retention  Assessment and Plan of Treatment: You are going home with the concepcion catheter because you are retaining urine and failed to urinate on your own. Continue to take Flomax daily as directed. Monitor for signs of severe abdominal or flank pain, chills/tremors, cloudy urine. Follow up with outpatient [facility] or PCP in 1-2 weeks for further maangement    Diagnosis: Multifocal pneumonia  Assessment and Plan of Treatment: A course of antibiotics was completed during your hospital stay and after surgery. Monitor for worsening of disease, such as, increased cough/sputum, difficulty breathing, fever/chills, or changes in mental status. Follow-up with your PCP or [facility] as an outpatient for further medical care/recommendations in 2-3 weeks per routine No abnormalities

## 2022-09-07 NOTE — PATIENT PROFILE ADULT - INTERPRETATION SERVICES DECLINED
Emergency airway/Airway protection Patient/Caregiver requests family/friend to interpret. Communicable/Infectious (Pediatric)

## 2023-09-11 NOTE — CHART NOTE - NSCHARTNOTESELECT_GEN_ALL_CORE
Event Note/Orthopedics Terbinafine Counseling: Patient counseling regarding adverse effects of terbinafine including but not limited to headache, diarrhea, rash, upset stomach, liver function test abnormalities, itching, taste/smell disturbance, nausea, abdominal pain, and flatulence.  There is a rare possibility of liver failure that can occur when taking terbinafine.  The patient understands that a baseline LFT and kidney function test may be required. The patient verbalized understanding of the proper use and possible adverse effects of terbinafine.  All of the patient's questions and concerns were addressed.